# Patient Record
Sex: FEMALE | Race: BLACK OR AFRICAN AMERICAN | Employment: OTHER | URBAN - METROPOLITAN AREA
[De-identification: names, ages, dates, MRNs, and addresses within clinical notes are randomized per-mention and may not be internally consistent; named-entity substitution may affect disease eponyms.]

---

## 2019-09-17 ENCOUNTER — OFFICE VISIT (OUTPATIENT)
Dept: CARDIOLOGY CLINIC | Age: 84
End: 2019-09-17
Payer: MEDICARE

## 2019-09-17 VITALS
DIASTOLIC BLOOD PRESSURE: 80 MMHG | BODY MASS INDEX: 20.66 KG/M2 | SYSTOLIC BLOOD PRESSURE: 138 MMHG | HEART RATE: 72 BPM | HEIGHT: 65 IN | WEIGHT: 124 LBS

## 2019-09-17 DIAGNOSIS — I25.119 CORONARY ARTERY DISEASE INVOLVING NATIVE CORONARY ARTERY OF NATIVE HEART WITH ANGINA PECTORIS (HCC): ICD-10-CM

## 2019-09-17 DIAGNOSIS — R07.9 CHEST PAIN, UNSPECIFIED TYPE: ICD-10-CM

## 2019-09-17 DIAGNOSIS — I48.91 ATRIAL FIBRILLATION, UNSPECIFIED TYPE (HCC): Primary | ICD-10-CM

## 2019-09-17 DIAGNOSIS — E78.5 HYPERLIPIDEMIA, UNSPECIFIED HYPERLIPIDEMIA TYPE: ICD-10-CM

## 2019-09-17 DIAGNOSIS — I10 ESSENTIAL HYPERTENSION: ICD-10-CM

## 2019-09-17 PROBLEM — I25.10 CAD (CORONARY ARTERY DISEASE): Status: ACTIVE | Noted: 2019-09-17

## 2019-09-17 PROCEDURE — G8420 CALC BMI NORM PARAMETERS: HCPCS | Performed by: INTERNAL MEDICINE

## 2019-09-17 PROCEDURE — G8400 PT W/DXA NO RESULTS DOC: HCPCS | Performed by: INTERNAL MEDICINE

## 2019-09-17 PROCEDURE — G8599 NO ASA/ANTIPLAT THER USE RNG: HCPCS | Performed by: INTERNAL MEDICINE

## 2019-09-17 PROCEDURE — G8427 DOCREV CUR MEDS BY ELIG CLIN: HCPCS | Performed by: INTERNAL MEDICINE

## 2019-09-17 PROCEDURE — 1036F TOBACCO NON-USER: CPT | Performed by: INTERNAL MEDICINE

## 2019-09-17 PROCEDURE — 1090F PRES/ABSN URINE INCON ASSESS: CPT | Performed by: INTERNAL MEDICINE

## 2019-09-17 PROCEDURE — 1123F ACP DISCUSS/DSCN MKR DOCD: CPT | Performed by: INTERNAL MEDICINE

## 2019-09-17 PROCEDURE — 4040F PNEUMOC VAC/ADMIN/RCVD: CPT | Performed by: INTERNAL MEDICINE

## 2019-09-17 PROCEDURE — 93000 ELECTROCARDIOGRAM COMPLETE: CPT | Performed by: INTERNAL MEDICINE

## 2019-09-17 PROCEDURE — 99204 OFFICE O/P NEW MOD 45 MIN: CPT | Performed by: INTERNAL MEDICINE

## 2019-09-17 RX ORDER — LEVOTHYROXINE SODIUM 0.07 MG/1
75 TABLET ORAL DAILY
COMMUNITY
End: 2020-06-22 | Stop reason: SDUPTHER

## 2019-09-17 RX ORDER — PRIMIDONE 50 MG/1
50 TABLET ORAL NIGHTLY
COMMUNITY
End: 2020-04-14 | Stop reason: SDUPTHER

## 2019-09-17 RX ORDER — ROSUVASTATIN CALCIUM 10 MG/1
10 TABLET, COATED ORAL DAILY
COMMUNITY
End: 2019-12-04 | Stop reason: SDUPTHER

## 2019-09-17 RX ORDER — RANOLAZINE 500 MG/1
500 TABLET, EXTENDED RELEASE ORAL 2 TIMES DAILY
Qty: 60 TABLET | Refills: 3 | Status: SHIPPED | OUTPATIENT
Start: 2019-09-17 | End: 2019-09-18

## 2019-09-17 RX ORDER — ISOSORBIDE MONONITRATE 120 MG/1
180 TABLET, EXTENDED RELEASE ORAL DAILY
COMMUNITY
End: 2019-12-04 | Stop reason: SDUPTHER

## 2019-09-17 RX ORDER — LISINOPRIL 40 MG/1
40 TABLET ORAL DAILY
COMMUNITY
End: 2020-05-28 | Stop reason: SDUPTHER

## 2019-09-17 RX ORDER — IBUPROFEN 800 MG/1
800 TABLET ORAL PRN
Status: ON HOLD | COMMUNITY
End: 2019-12-24

## 2019-09-17 ASSESSMENT — ENCOUNTER SYMPTOMS
EYE PAIN: 0
APNEA: 0
DIARRHEA: 0
VOMITING: 0
COLOR CHANGE: 0
WHEEZING: 0
TROUBLE SWALLOWING: 1
NAUSEA: 0
CONSTIPATION: 1
ABDOMINAL DISTENTION: 0
CHEST TIGHTNESS: 0
BLOOD IN STOOL: 0
ABDOMINAL PAIN: 0
BACK PAIN: 0
STRIDOR: 0
PHOTOPHOBIA: 0

## 2019-09-18 ENCOUNTER — TELEPHONE (OUTPATIENT)
Dept: CARDIOLOGY CLINIC | Age: 84
End: 2019-09-18

## 2019-09-18 NOTE — TELEPHONE ENCOUNTER
Deepthi betancourt from [employer] regarding an interaction between ranolazine and primidone.  Please reach Harmony Gaxiola on [phone]

## 2019-11-05 ENCOUNTER — OFFICE VISIT (OUTPATIENT)
Dept: PRIMARY CARE CLINIC | Age: 84
End: 2019-11-05
Payer: MEDICARE

## 2019-11-05 ENCOUNTER — TELEPHONE (OUTPATIENT)
Dept: PRIMARY CARE CLINIC | Age: 84
End: 2019-11-05

## 2019-11-05 VITALS
RESPIRATION RATE: 16 BRPM | SYSTOLIC BLOOD PRESSURE: 130 MMHG | DIASTOLIC BLOOD PRESSURE: 60 MMHG | HEART RATE: 68 BPM | OXYGEN SATURATION: 98 % | HEIGHT: 65 IN | TEMPERATURE: 98.1 F | BODY MASS INDEX: 21.06 KG/M2 | WEIGHT: 126.4 LBS

## 2019-11-05 DIAGNOSIS — Z23 NEED FOR SHINGLES VACCINE: ICD-10-CM

## 2019-11-05 DIAGNOSIS — M54.2 NECK PAIN: ICD-10-CM

## 2019-11-05 DIAGNOSIS — Z12.31 ENCOUNTER FOR SCREENING MAMMOGRAM FOR BREAST CANCER: ICD-10-CM

## 2019-11-05 DIAGNOSIS — I10 ESSENTIAL HYPERTENSION: Primary | ICD-10-CM

## 2019-11-05 DIAGNOSIS — E78.2 MIXED HYPERLIPIDEMIA: ICD-10-CM

## 2019-11-05 DIAGNOSIS — F32.9 REACTIVE DEPRESSION: ICD-10-CM

## 2019-11-05 DIAGNOSIS — E89.0 POSTSURGICAL HYPOTHYROIDISM: ICD-10-CM

## 2019-11-05 PROCEDURE — 1036F TOBACCO NON-USER: CPT | Performed by: INTERNAL MEDICINE

## 2019-11-05 PROCEDURE — G8420 CALC BMI NORM PARAMETERS: HCPCS | Performed by: INTERNAL MEDICINE

## 2019-11-05 PROCEDURE — 1123F ACP DISCUSS/DSCN MKR DOCD: CPT | Performed by: INTERNAL MEDICINE

## 2019-11-05 PROCEDURE — 1090F PRES/ABSN URINE INCON ASSESS: CPT | Performed by: INTERNAL MEDICINE

## 2019-11-05 PROCEDURE — G8599 NO ASA/ANTIPLAT THER USE RNG: HCPCS | Performed by: INTERNAL MEDICINE

## 2019-11-05 PROCEDURE — 4040F PNEUMOC VAC/ADMIN/RCVD: CPT | Performed by: INTERNAL MEDICINE

## 2019-11-05 PROCEDURE — G8427 DOCREV CUR MEDS BY ELIG CLIN: HCPCS | Performed by: INTERNAL MEDICINE

## 2019-11-05 PROCEDURE — G8400 PT W/DXA NO RESULTS DOC: HCPCS | Performed by: INTERNAL MEDICINE

## 2019-11-05 PROCEDURE — 99203 OFFICE O/P NEW LOW 30 MIN: CPT | Performed by: INTERNAL MEDICINE

## 2019-11-05 PROCEDURE — G8484 FLU IMMUNIZE NO ADMIN: HCPCS | Performed by: INTERNAL MEDICINE

## 2019-11-05 RX ORDER — BUPROPION HYDROCHLORIDE 150 MG/1
150 TABLET ORAL DAILY
COMMUNITY
Start: 2019-08-20 | End: 2020-02-11 | Stop reason: DRUGHIGH

## 2019-11-05 RX ORDER — LIDOCAINE 50 MG/G
1 PATCH TOPICAL DAILY
Qty: 30 PATCH | Refills: 0 | Status: SHIPPED | OUTPATIENT
Start: 2019-11-05 | End: 2020-03-30 | Stop reason: CLARIF

## 2019-11-05 ASSESSMENT — PATIENT HEALTH QUESTIONNAIRE - PHQ9
SUM OF ALL RESPONSES TO PHQ QUESTIONS 1-9: 0
2. FEELING DOWN, DEPRESSED OR HOPELESS: 0
SUM OF ALL RESPONSES TO PHQ QUESTIONS 1-9: 0
SUM OF ALL RESPONSES TO PHQ9 QUESTIONS 1 & 2: 0
1. LITTLE INTEREST OR PLEASURE IN DOING THINGS: 0

## 2019-11-14 PROBLEM — E89.0 POSTSURGICAL HYPOTHYROIDISM: Status: ACTIVE | Noted: 2019-11-14

## 2019-11-14 PROBLEM — F32.9 REACTIVE DEPRESSION: Status: ACTIVE | Noted: 2019-11-14

## 2019-11-14 PROBLEM — M54.2 NECK PAIN: Status: ACTIVE | Noted: 2019-11-14

## 2019-11-14 ASSESSMENT — ENCOUNTER SYMPTOMS
CHEST TIGHTNESS: 0
NAUSEA: 0
CONSTIPATION: 0
COUGH: 0
SHORTNESS OF BREATH: 0
VOMITING: 0
ABDOMINAL PAIN: 0
DIARRHEA: 0
BLOOD IN STOOL: 0
WHEEZING: 0
SINUS PRESSURE: 0
SORE THROAT: 0
RHINORRHEA: 0

## 2019-11-25 ENCOUNTER — HOSPITAL ENCOUNTER (OUTPATIENT)
Dept: WOMENS IMAGING | Age: 84
Discharge: HOME | End: 2019-11-25
Payer: MEDICARE

## 2019-11-25 DIAGNOSIS — Z12.31 ENCOUNTER FOR SCREENING MAMMOGRAM FOR BREAST CANCER: ICD-10-CM

## 2019-11-25 PROCEDURE — 77063 BREAST TOMOSYNTHESIS BI: CPT

## 2019-12-02 ENCOUNTER — TELEPHONE (OUTPATIENT)
Dept: PRIMARY CARE CLINIC | Age: 84
End: 2019-12-02

## 2019-12-04 ENCOUNTER — OFFICE VISIT (OUTPATIENT)
Dept: PRIMARY CARE CLINIC | Age: 84
End: 2019-12-04
Payer: MEDICARE

## 2019-12-04 VITALS
HEART RATE: 59 BPM | RESPIRATION RATE: 12 BRPM | DIASTOLIC BLOOD PRESSURE: 73 MMHG | BODY MASS INDEX: 20.85 KG/M2 | SYSTOLIC BLOOD PRESSURE: 170 MMHG | WEIGHT: 123.4 LBS | OXYGEN SATURATION: 97 %

## 2019-12-04 DIAGNOSIS — I10 ESSENTIAL HYPERTENSION: ICD-10-CM

## 2019-12-04 DIAGNOSIS — E78.2 MIXED HYPERLIPIDEMIA: Chronic | ICD-10-CM

## 2019-12-04 DIAGNOSIS — E89.0 POSTSURGICAL HYPOTHYROIDISM: ICD-10-CM

## 2019-12-04 PROCEDURE — 1090F PRES/ABSN URINE INCON ASSESS: CPT | Performed by: INTERNAL MEDICINE

## 2019-12-04 PROCEDURE — 1123F ACP DISCUSS/DSCN MKR DOCD: CPT | Performed by: INTERNAL MEDICINE

## 2019-12-04 PROCEDURE — 1036F TOBACCO NON-USER: CPT | Performed by: INTERNAL MEDICINE

## 2019-12-04 PROCEDURE — G8420 CALC BMI NORM PARAMETERS: HCPCS | Performed by: INTERNAL MEDICINE

## 2019-12-04 PROCEDURE — 99214 OFFICE O/P EST MOD 30 MIN: CPT | Performed by: INTERNAL MEDICINE

## 2019-12-04 PROCEDURE — G8427 DOCREV CUR MEDS BY ELIG CLIN: HCPCS | Performed by: INTERNAL MEDICINE

## 2019-12-04 PROCEDURE — 4040F PNEUMOC VAC/ADMIN/RCVD: CPT | Performed by: INTERNAL MEDICINE

## 2019-12-04 PROCEDURE — G8484 FLU IMMUNIZE NO ADMIN: HCPCS | Performed by: INTERNAL MEDICINE

## 2019-12-04 PROCEDURE — G8400 PT W/DXA NO RESULTS DOC: HCPCS | Performed by: INTERNAL MEDICINE

## 2019-12-04 PROCEDURE — G8599 NO ASA/ANTIPLAT THER USE RNG: HCPCS | Performed by: INTERNAL MEDICINE

## 2019-12-04 RX ORDER — ROSUVASTATIN CALCIUM 10 MG/1
10 TABLET, COATED ORAL DAILY
Qty: 90 TABLET | Refills: 3 | Status: ON HOLD | OUTPATIENT
Start: 2019-12-04 | End: 2020-04-01 | Stop reason: HOSPADM

## 2019-12-04 RX ORDER — HYDROCHLOROTHIAZIDE 25 MG/1
25 TABLET ORAL EVERY MORNING
Qty: 30 TABLET | Refills: 5 | Status: SHIPPED | OUTPATIENT
Start: 2019-12-04 | End: 2019-12-05 | Stop reason: SDUPTHER

## 2019-12-04 RX ORDER — ISOSORBIDE MONONITRATE 120 MG/1
180 TABLET, EXTENDED RELEASE ORAL DAILY
Qty: 90 TABLET | Refills: 3 | Status: SHIPPED | OUTPATIENT
Start: 2019-12-04 | End: 2020-09-02

## 2019-12-05 DIAGNOSIS — I10 ESSENTIAL HYPERTENSION: Primary | ICD-10-CM

## 2019-12-05 RX ORDER — HYDROCHLOROTHIAZIDE 25 MG/1
25 TABLET ORAL EVERY MORNING
Qty: 90 TABLET | Refills: 1 | Status: SHIPPED | OUTPATIENT
Start: 2019-12-05 | End: 2020-03-10 | Stop reason: DRUGHIGH

## 2019-12-09 ENCOUNTER — OFFICE VISIT (OUTPATIENT)
Dept: SURGERY | Age: 84
End: 2019-12-09
Payer: MEDICARE

## 2019-12-09 ENCOUNTER — PREP FOR PROCEDURE (OUTPATIENT)
Dept: SURGERY | Age: 84
End: 2019-12-09

## 2019-12-09 VITALS
BODY MASS INDEX: 21.17 KG/M2 | WEIGHT: 124 LBS | SYSTOLIC BLOOD PRESSURE: 130 MMHG | DIASTOLIC BLOOD PRESSURE: 78 MMHG | HEIGHT: 64 IN

## 2019-12-09 DIAGNOSIS — M79.89 SOFT TISSUE MASS: Primary | ICD-10-CM

## 2019-12-09 PROCEDURE — G8420 CALC BMI NORM PARAMETERS: HCPCS | Performed by: SURGERY

## 2019-12-09 PROCEDURE — 1090F PRES/ABSN URINE INCON ASSESS: CPT | Performed by: SURGERY

## 2019-12-09 PROCEDURE — 1036F TOBACCO NON-USER: CPT | Performed by: SURGERY

## 2019-12-09 PROCEDURE — G8599 NO ASA/ANTIPLAT THER USE RNG: HCPCS | Performed by: SURGERY

## 2019-12-09 PROCEDURE — G8484 FLU IMMUNIZE NO ADMIN: HCPCS | Performed by: SURGERY

## 2019-12-09 PROCEDURE — G8400 PT W/DXA NO RESULTS DOC: HCPCS | Performed by: SURGERY

## 2019-12-09 PROCEDURE — 99203 OFFICE O/P NEW LOW 30 MIN: CPT | Performed by: SURGERY

## 2019-12-09 PROCEDURE — 1123F ACP DISCUSS/DSCN MKR DOCD: CPT | Performed by: SURGERY

## 2019-12-09 PROCEDURE — G8427 DOCREV CUR MEDS BY ELIG CLIN: HCPCS | Performed by: SURGERY

## 2019-12-09 PROCEDURE — 4040F PNEUMOC VAC/ADMIN/RCVD: CPT | Performed by: SURGERY

## 2019-12-09 RX ORDER — SODIUM CHLORIDE 0.9 % (FLUSH) 0.9 %
10 SYRINGE (ML) INJECTION EVERY 12 HOURS SCHEDULED
Status: CANCELLED | OUTPATIENT
Start: 2019-12-09

## 2019-12-09 RX ORDER — SODIUM CHLORIDE 0.9 % (FLUSH) 0.9 %
10 SYRINGE (ML) INJECTION PRN
Status: CANCELLED | OUTPATIENT
Start: 2019-12-09

## 2019-12-09 ASSESSMENT — ENCOUNTER SYMPTOMS
VOICE CHANGE: 1
ABDOMINAL PAIN: 1
EYE DISCHARGE: 1
TROUBLE SWALLOWING: 1
APNEA: 0
CHEST TIGHTNESS: 0
RHINORRHEA: 1
ABDOMINAL DISTENTION: 1
EYE ITCHING: 1
COLOR CHANGE: 0
BACK PAIN: 1
NAUSEA: 1

## 2019-12-24 ENCOUNTER — ANESTHESIA EVENT (OUTPATIENT)
Dept: OPERATING ROOM | Age: 84
End: 2019-12-24
Payer: MEDICARE

## 2019-12-24 ENCOUNTER — HOSPITAL ENCOUNTER (OUTPATIENT)
Age: 84
Setting detail: OUTPATIENT SURGERY
Discharge: HOME | End: 2019-12-24
Attending: SURGERY | Admitting: SURGERY
Payer: MEDICARE

## 2019-12-24 ENCOUNTER — ANESTHESIA (OUTPATIENT)
Dept: OPERATING ROOM | Age: 84
End: 2019-12-24
Payer: MEDICARE

## 2019-12-24 VITALS
HEART RATE: 66 BPM | BODY MASS INDEX: 20.98 KG/M2 | DIASTOLIC BLOOD PRESSURE: 70 MMHG | SYSTOLIC BLOOD PRESSURE: 138 MMHG | TEMPERATURE: 97.3 F | HEIGHT: 64 IN | OXYGEN SATURATION: 99 % | WEIGHT: 122.9 LBS | RESPIRATION RATE: 17 BRPM

## 2019-12-24 VITALS — SYSTOLIC BLOOD PRESSURE: 117 MMHG | DIASTOLIC BLOOD PRESSURE: 59 MMHG | OXYGEN SATURATION: 99 % | TEMPERATURE: 97.2 F

## 2019-12-24 DIAGNOSIS — L98.9 SKIN LESION: Primary | ICD-10-CM

## 2019-12-24 LAB
ANION GAP SERPL CALCULATED.3IONS-SCNC: 9 MMOL/L (ref 3–16)
BUN BLDV-MCNC: 19 MG/DL (ref 7–20)
CALCIUM SERPL-MCNC: 9.4 MG/DL (ref 8.3–10.6)
CHLORIDE BLD-SCNC: 104 MMOL/L (ref 99–110)
CO2: 28 MMOL/L (ref 21–32)
CREAT SERPL-MCNC: 0.9 MG/DL (ref 0.6–1.2)
GFR AFRICAN AMERICAN: >60
GFR NON-AFRICAN AMERICAN: 60
GLUCOSE BLD-MCNC: 86 MG/DL (ref 70–99)
POTASSIUM SERPL-SCNC: 3.7 MMOL/L (ref 3.5–5.1)
SODIUM BLD-SCNC: 141 MMOL/L (ref 136–145)

## 2019-12-24 PROCEDURE — 12032 INTMD RPR S/A/T/EXT 2.6-7.5: CPT | Performed by: SURGERY

## 2019-12-24 PROCEDURE — 3700000001 HC ADD 15 MINUTES (ANESTHESIA): Performed by: SURGERY

## 2019-12-24 PROCEDURE — 3600000012 HC SURGERY LEVEL 2 ADDTL 15MIN: Performed by: SURGERY

## 2019-12-24 PROCEDURE — 11402 EXC TR-EXT B9+MARG 1.1-2 CM: CPT | Performed by: SURGERY

## 2019-12-24 PROCEDURE — 2580000003 HC RX 258: Performed by: NURSE ANESTHETIST, CERTIFIED REGISTERED

## 2019-12-24 PROCEDURE — 21930 EXC BACK LES SC < 3 CM: CPT | Performed by: SURGERY

## 2019-12-24 PROCEDURE — 7100000011 HC PHASE II RECOVERY - ADDTL 15 MIN: Performed by: SURGERY

## 2019-12-24 PROCEDURE — 2709999900 HC NON-CHARGEABLE SUPPLY: Performed by: SURGERY

## 2019-12-24 PROCEDURE — 36415 COLL VENOUS BLD VENIPUNCTURE: CPT

## 2019-12-24 PROCEDURE — 2500000003 HC RX 250 WO HCPCS: Performed by: NURSE ANESTHETIST, CERTIFIED REGISTERED

## 2019-12-24 PROCEDURE — 7100000001 HC PACU RECOVERY - ADDTL 15 MIN: Performed by: SURGERY

## 2019-12-24 PROCEDURE — 7100000010 HC PHASE II RECOVERY - FIRST 15 MIN: Performed by: SURGERY

## 2019-12-24 PROCEDURE — 7100000000 HC PACU RECOVERY - FIRST 15 MIN: Performed by: SURGERY

## 2019-12-24 PROCEDURE — 6360000002 HC RX W HCPCS: Performed by: NURSE ANESTHETIST, CERTIFIED REGISTERED

## 2019-12-24 PROCEDURE — 88304 TISSUE EXAM BY PATHOLOGIST: CPT

## 2019-12-24 PROCEDURE — 80048 BASIC METABOLIC PNL TOTAL CA: CPT

## 2019-12-24 PROCEDURE — 6360000002 HC RX W HCPCS: Performed by: SURGERY

## 2019-12-24 PROCEDURE — 3700000000 HC ANESTHESIA ATTENDED CARE: Performed by: SURGERY

## 2019-12-24 PROCEDURE — 3600000002 HC SURGERY LEVEL 2 BASE: Performed by: SURGERY

## 2019-12-24 PROCEDURE — 2580000003 HC RX 258: Performed by: SURGERY

## 2019-12-24 RX ORDER — OXYCODONE HYDROCHLORIDE 5 MG/1
5 TABLET ORAL EVERY 4 HOURS PRN
Qty: 10 TABLET | Refills: 0 | Status: SHIPPED | OUTPATIENT
Start: 2019-12-24 | End: 2019-12-31

## 2019-12-24 RX ORDER — MAGNESIUM HYDROXIDE 1200 MG/15ML
LIQUID ORAL CONTINUOUS PRN
Status: COMPLETED | OUTPATIENT
Start: 2019-12-24 | End: 2019-12-24

## 2019-12-24 RX ORDER — GLYCOPYRROLATE 1 MG/5 ML
SYRINGE (ML) INTRAVENOUS PRN
Status: DISCONTINUED | OUTPATIENT
Start: 2019-12-24 | End: 2019-12-24 | Stop reason: SDUPTHER

## 2019-12-24 RX ORDER — DEXAMETHASONE SODIUM PHOSPHATE 4 MG/ML
INJECTION, SOLUTION INTRA-ARTICULAR; INTRALESIONAL; INTRAMUSCULAR; INTRAVENOUS; SOFT TISSUE PRN
Status: DISCONTINUED | OUTPATIENT
Start: 2019-12-24 | End: 2019-12-24 | Stop reason: SDUPTHER

## 2019-12-24 RX ORDER — BUPIVACAINE HYDROCHLORIDE AND EPINEPHRINE 5; 5 MG/ML; UG/ML
INJECTION, SOLUTION PERINEURAL
Status: COMPLETED | OUTPATIENT
Start: 2019-12-24 | End: 2019-12-24

## 2019-12-24 RX ORDER — SODIUM CHLORIDE, SODIUM LACTATE, POTASSIUM CHLORIDE, CALCIUM CHLORIDE 600; 310; 30; 20 MG/100ML; MG/100ML; MG/100ML; MG/100ML
INJECTION, SOLUTION INTRAVENOUS CONTINUOUS PRN
Status: DISCONTINUED | OUTPATIENT
Start: 2019-12-24 | End: 2019-12-24 | Stop reason: SDUPTHER

## 2019-12-24 RX ORDER — PROPOFOL 10 MG/ML
INJECTION, EMULSION INTRAVENOUS CONTINUOUS PRN
Status: DISCONTINUED | OUTPATIENT
Start: 2019-12-24 | End: 2019-12-24 | Stop reason: SDUPTHER

## 2019-12-24 RX ORDER — LIDOCAINE HYDROCHLORIDE 20 MG/ML
INJECTION, SOLUTION INFILTRATION; PERINEURAL PRN
Status: DISCONTINUED | OUTPATIENT
Start: 2019-12-24 | End: 2019-12-24 | Stop reason: SDUPTHER

## 2019-12-24 RX ORDER — SODIUM CHLORIDE 0.9 % (FLUSH) 0.9 %
10 SYRINGE (ML) INJECTION EVERY 12 HOURS SCHEDULED
Status: DISCONTINUED | OUTPATIENT
Start: 2019-12-24 | End: 2019-12-24 | Stop reason: HOSPADM

## 2019-12-24 RX ORDER — PHENYLEPHRINE HCL IN 0.9% NACL 1 MG/10 ML
SYRINGE (ML) INTRAVENOUS PRN
Status: DISCONTINUED | OUTPATIENT
Start: 2019-12-24 | End: 2019-12-24 | Stop reason: SDUPTHER

## 2019-12-24 RX ORDER — SODIUM CHLORIDE 0.9 % (FLUSH) 0.9 %
10 SYRINGE (ML) INJECTION PRN
Status: DISCONTINUED | OUTPATIENT
Start: 2019-12-24 | End: 2019-12-24 | Stop reason: HOSPADM

## 2019-12-24 RX ORDER — PROPOFOL 10 MG/ML
INJECTION, EMULSION INTRAVENOUS PRN
Status: DISCONTINUED | OUTPATIENT
Start: 2019-12-24 | End: 2019-12-24 | Stop reason: SDUPTHER

## 2019-12-24 RX ORDER — CEFAZOLIN SODIUM 2 G/100ML
2 INJECTION, SOLUTION INTRAVENOUS
Status: COMPLETED | OUTPATIENT
Start: 2019-12-24 | End: 2019-12-24

## 2019-12-24 RX ORDER — ONDANSETRON 2 MG/ML
INJECTION INTRAMUSCULAR; INTRAVENOUS PRN
Status: DISCONTINUED | OUTPATIENT
Start: 2019-12-24 | End: 2019-12-24 | Stop reason: SDUPTHER

## 2019-12-24 RX ORDER — TIZANIDINE 2 MG/1
2 TABLET ORAL 3 TIMES DAILY
COMMUNITY
End: 2020-03-10

## 2019-12-24 RX ORDER — FENTANYL CITRATE 50 UG/ML
INJECTION, SOLUTION INTRAMUSCULAR; INTRAVENOUS PRN
Status: DISCONTINUED | OUTPATIENT
Start: 2019-12-24 | End: 2019-12-24 | Stop reason: SDUPTHER

## 2019-12-24 RX ADMIN — FENTANYL CITRATE 25 MCG: 50 INJECTION, SOLUTION INTRAMUSCULAR; INTRAVENOUS at 07:30

## 2019-12-24 RX ADMIN — Medication 50 MCG: at 07:40

## 2019-12-24 RX ADMIN — PROPOFOL 80 MCG/KG/MIN: 10 INJECTION, EMULSION INTRAVENOUS at 07:32

## 2019-12-24 RX ADMIN — FENTANYL CITRATE 25 MCG: 50 INJECTION, SOLUTION INTRAMUSCULAR; INTRAVENOUS at 07:35

## 2019-12-24 RX ADMIN — CEFAZOLIN SODIUM 2 G: 2 INJECTION, SOLUTION INTRAVENOUS at 07:23

## 2019-12-24 RX ADMIN — Medication 50 MCG: at 07:47

## 2019-12-24 RX ADMIN — PROPOFOL 20 MG: 10 INJECTION, EMULSION INTRAVENOUS at 07:40

## 2019-12-24 RX ADMIN — Medication 0.1 MG: at 07:37

## 2019-12-24 RX ADMIN — DEXAMETHASONE SODIUM PHOSPHATE 4 MG: 4 INJECTION, SOLUTION INTRAMUSCULAR; INTRAVENOUS at 07:36

## 2019-12-24 RX ADMIN — LIDOCAINE HYDROCHLORIDE 40 MG: 20 INJECTION, SOLUTION INFILTRATION; PERINEURAL at 07:32

## 2019-12-24 RX ADMIN — PROPOFOL 40 MG: 10 INJECTION, EMULSION INTRAVENOUS at 07:32

## 2019-12-24 RX ADMIN — ONDANSETRON 4 MG: 2 INJECTION INTRAMUSCULAR; INTRAVENOUS at 07:36

## 2019-12-24 RX ADMIN — SODIUM CHLORIDE, POTASSIUM CHLORIDE, SODIUM LACTATE AND CALCIUM CHLORIDE: 600; 310; 30; 20 INJECTION, SOLUTION INTRAVENOUS at 07:19

## 2019-12-24 RX ADMIN — PROPOFOL 20 MG: 10 INJECTION, EMULSION INTRAVENOUS at 07:42

## 2019-12-24 RX ADMIN — PROPOFOL 10 MG: 10 INJECTION, EMULSION INTRAVENOUS at 07:46

## 2019-12-24 ASSESSMENT — PULMONARY FUNCTION TESTS
PIF_VALUE: 0
PIF_VALUE: 1
PIF_VALUE: 0

## 2020-01-07 ENCOUNTER — OFFICE VISIT (OUTPATIENT)
Dept: SURGERY | Age: 85
End: 2020-01-07

## 2020-01-07 VITALS — DIASTOLIC BLOOD PRESSURE: 62 MMHG | SYSTOLIC BLOOD PRESSURE: 118 MMHG | BODY MASS INDEX: 20.94 KG/M2 | WEIGHT: 122 LBS

## 2020-01-07 PROCEDURE — 99024 POSTOP FOLLOW-UP VISIT: CPT | Performed by: SURGERY

## 2020-02-11 ENCOUNTER — OFFICE VISIT (OUTPATIENT)
Dept: PRIMARY CARE CLINIC | Age: 85
End: 2020-02-11
Payer: MEDICARE

## 2020-02-11 VITALS
WEIGHT: 124.6 LBS | HEART RATE: 57 BPM | SYSTOLIC BLOOD PRESSURE: 158 MMHG | HEIGHT: 64 IN | OXYGEN SATURATION: 98 % | DIASTOLIC BLOOD PRESSURE: 70 MMHG | BODY MASS INDEX: 21.27 KG/M2

## 2020-02-11 PROCEDURE — G8427 DOCREV CUR MEDS BY ELIG CLIN: HCPCS | Performed by: INTERNAL MEDICINE

## 2020-02-11 PROCEDURE — 1036F TOBACCO NON-USER: CPT | Performed by: INTERNAL MEDICINE

## 2020-02-11 PROCEDURE — G8484 FLU IMMUNIZE NO ADMIN: HCPCS | Performed by: INTERNAL MEDICINE

## 2020-02-11 PROCEDURE — 69210 REMOVE IMPACTED EAR WAX UNI: CPT | Performed by: INTERNAL MEDICINE

## 2020-02-11 PROCEDURE — G8420 CALC BMI NORM PARAMETERS: HCPCS | Performed by: INTERNAL MEDICINE

## 2020-02-11 PROCEDURE — 99214 OFFICE O/P EST MOD 30 MIN: CPT | Performed by: INTERNAL MEDICINE

## 2020-02-11 PROCEDURE — 1090F PRES/ABSN URINE INCON ASSESS: CPT | Performed by: INTERNAL MEDICINE

## 2020-02-11 PROCEDURE — 1123F ACP DISCUSS/DSCN MKR DOCD: CPT | Performed by: INTERNAL MEDICINE

## 2020-02-11 PROCEDURE — 4040F PNEUMOC VAC/ADMIN/RCVD: CPT | Performed by: INTERNAL MEDICINE

## 2020-02-11 PROCEDURE — G8400 PT W/DXA NO RESULTS DOC: HCPCS | Performed by: INTERNAL MEDICINE

## 2020-02-11 RX ORDER — AMLODIPINE BESYLATE 2.5 MG/1
2.5 TABLET ORAL DAILY
Qty: 30 TABLET | Refills: 1 | Status: SHIPPED | OUTPATIENT
Start: 2020-02-11 | End: 2020-02-11

## 2020-02-11 RX ORDER — BUPROPION HYDROCHLORIDE 300 MG/1
300 TABLET ORAL EVERY MORNING
Qty: 30 TABLET | Refills: 1 | Status: SHIPPED | OUTPATIENT
Start: 2020-02-11 | End: 2020-05-03 | Stop reason: SDUPTHER

## 2020-02-11 RX ORDER — AMLODIPINE BESYLATE 2.5 MG/1
2.5 TABLET ORAL DAILY
Qty: 90 TABLET | Refills: 0 | Status: SHIPPED | OUTPATIENT
Start: 2020-02-11 | End: 2020-05-28 | Stop reason: SDUPTHER

## 2020-02-11 ASSESSMENT — ENCOUNTER SYMPTOMS
CONSTIPATION: 0
RHINORRHEA: 0
COUGH: 0
ABDOMINAL PAIN: 0
SINUS PRESSURE: 0
NAUSEA: 1
CHEST TIGHTNESS: 0
SORE THROAT: 0
BLOOD IN STOOL: 0
VOMITING: 0
WHEEZING: 0
SHORTNESS OF BREATH: 0
DIARRHEA: 0

## 2020-02-11 NOTE — PATIENT INSTRUCTIONS
1. Essential hypertension  - blood pressure is elevated and has been high  -Continue same medications  -Start amLODIPine (NORVASC) 2.5 MG tablet; Take 1 tablet by mouth daily  Dispense: 30 tablet; Refill: 1  -Low sodium diet    2. Mixed hyperlipidemia  -Continue same medications  -Low fat, low cholesterol diet  -Patient to have fasting labs done previously ordered    3. Reactive depression  -Increase buPROPion (WELLBUTRIN XL) 300 MG extended release tablet; Take 1 tablet by mouth every morning  Dispense: 30 tablet; Refill: 1  - patient declines counseling    4. Postsurgical hypothyroidism  -stable  -Continue same medications    5.  Bilateral impacted cerumen  - 82896 - CT REMOVE IMPACTED EAR WAX  Ear wax removed by Medical Assistant by soaking with peroxide for 5 minutes and then wax flushed out with an ear wash spray bottle, TM's negative after ear wax removed

## 2020-02-11 NOTE — PROGRESS NOTES
Naveen Baugh   Date ofBirth:  1935    Date of Visit:  2020    Chief Complaint   Patient presents with    Hyperlipidemia    Hypertension    Depression    Hypothyroidism    Cerumen Impaction       HPI  Hypertension- Pt's BP has been high. Pt's BP has been 168/86 (20), 171/82 (20), 128/70 (2/10/20). Pt takes Lisinopril 40mg po q day and HCTZ 25mg po q day. Pt decreases salt. Pt is not exercising. Hyperlipidemia- Pt takes Rosuvastatin. Pt decreases fat and cholesterol. Depression- Pt takes Wellbutrin XL. Pt's depression has been ok. Wellbutrin XL helps a little. Pt thinks it needs to be increased. Pt denies crying spells. Pt states she feels depressed. Pt's   in 2019. Pt declines counseling. Hypothyroidism- Pt takes Levothyroxine 75mg po q day. Pt states she is fatigued but thinks it is due to the pain she has. Pt denies constipation and weight gain. Pt has cold intolerance. Pt needs ear wax cleaned out. Review of Systems   Constitutional: Negative for chills, fatigue and fever. HENT: Negative for congestion, postnasal drip, rhinorrhea, sinus pressure and sore throat. Eyes: Negative for visual disturbance. Respiratory: Negative for cough, chest tightness, shortness of breath and wheezing. Cardiovascular: Negative for chest pain, palpitations and leg swelling. Gastrointestinal: Positive for nausea (occasional). Negative for abdominal pain, blood in stool, constipation, diarrhea and vomiting. Genitourinary: Negative for dysuria, frequency and hematuria. Musculoskeletal: Positive for neck pain (Pt sees Pain Management). Negative for arthralgias and myalgias. Skin: Negative for rash. Neurological: Positive for headaches. Negative for dizziness, tremors, syncope, weakness, light-headedness and numbness. Psychiatric/Behavioral: Negative for dysphoric mood and sleep disturbance. The patient is not nervous/anxious.         No Known normal.      Extraocular Movements: Extraocular movements intact. Conjunctiva/sclera: Conjunctivae normal.      Pupils: Pupils are equal, round, and reactive to light. Neck:      Musculoskeletal: Neck supple. Thyroid: No thyromegaly. Vascular: No carotid bruit. Cardiovascular:      Rate and Rhythm: Normal rate and regular rhythm. Heart sounds: Normal heart sounds, S1 normal and S2 normal. No murmur. No friction rub. No gallop. Pulmonary:      Effort: Pulmonary effort is normal. No respiratory distress. Breath sounds: Normal breath sounds. No wheezing, rhonchi or rales. Abdominal:      General: Bowel sounds are normal. There is no distension. Palpations: Abdomen is soft. Tenderness: There is abdominal tenderness (mid abdomen, patient states she has had tenderness for a long time and it is not new). Musculoskeletal:      Right lower leg: No edema. Left lower leg: No edema. Lymphadenopathy:      Head:      Right side of head: No submandibular adenopathy. Left side of head: No submandibular adenopathy. Neurological:      Mental Status: She is alert and oriented to person, place, and time. Psychiatric:         Mood and Affect: Mood normal.           No results found for this visit on 02/11/20. Lab Review   Admission on 12/24/2019, Discharged on 12/24/2019   Component Date Value    Sodium 12/24/2019 141     Potassium 12/24/2019 3.7     Chloride 12/24/2019 104     CO2 12/24/2019 28     Anion Gap 12/24/2019 9     Glucose 12/24/2019 86     BUN 12/24/2019 19     CREATININE 12/24/2019 0.9     GFR Non- 12/24/2019 60*    GFR  12/24/2019 >60     Calcium 12/24/2019 9.4          Assessment/Plan     1. Essential hypertension  - blood pressure is elevated and has been high  -Continue same medications  -Start amLODIPine (NORVASC) 2.5 MG tablet; Take 1 tablet by mouth daily  Dispense: 30 tablet; Refill: 1  -Low sodium diet    2. Mixed hyperlipidemia  -Continue same medications  -Low fat, low cholesterol diet  -Patient to have fasting labs done previously ordered    3. Reactive depression  -Increase buPROPion (WELLBUTRIN XL) 300 MG extended release tablet; Take 1 tablet by mouth every morning  Dispense: 30 tablet; Refill: 1  - patient declines counseling    4. Postsurgical hypothyroidism  -stable  -Continue same medications    5. Bilateral impacted cerumen  - 63431 - DC REMOVE IMPACTED EAR WAX  Ear wax removed by Medical Assistant by soaking with peroxide for 5 minutes and then wax flushed out with an ear wash spray bottle, TM's negative after ear wax removed        Discussed medications with patient, who voiced understanding of their use and indications. All questions answered. Return in about 4 weeks (around 3/10/2020) for hypertension and depression.

## 2020-02-27 DIAGNOSIS — E78.2 MIXED HYPERLIPIDEMIA: ICD-10-CM

## 2020-02-27 DIAGNOSIS — E89.0 POSTSURGICAL HYPOTHYROIDISM: ICD-10-CM

## 2020-02-27 DIAGNOSIS — I10 ESSENTIAL HYPERTENSION: ICD-10-CM

## 2020-02-27 LAB
A/G RATIO: 2 (ref 1.1–2.2)
ALBUMIN SERPL-MCNC: 4.5 G/DL (ref 3.4–5)
ALP BLD-CCNC: 57 U/L (ref 40–129)
ALT SERPL-CCNC: 7 U/L (ref 10–40)
ANION GAP SERPL CALCULATED.3IONS-SCNC: 15 MMOL/L (ref 3–16)
AST SERPL-CCNC: 17 U/L (ref 15–37)
BILIRUB SERPL-MCNC: 0.4 MG/DL (ref 0–1)
BUN BLDV-MCNC: 15 MG/DL (ref 7–20)
CALCIUM SERPL-MCNC: 10 MG/DL (ref 8.3–10.6)
CHLORIDE BLD-SCNC: 103 MMOL/L (ref 99–110)
CHOLESTEROL, TOTAL: 180 MG/DL (ref 0–199)
CO2: 27 MMOL/L (ref 21–32)
CREAT SERPL-MCNC: 0.8 MG/DL (ref 0.6–1.2)
GFR AFRICAN AMERICAN: >60
GFR NON-AFRICAN AMERICAN: >60
GLOBULIN: 2.2 G/DL
GLUCOSE BLD-MCNC: 93 MG/DL (ref 70–99)
HDLC SERPL-MCNC: 98 MG/DL (ref 40–60)
LDL CHOLESTEROL CALCULATED: 72 MG/DL
POTASSIUM SERPL-SCNC: 3.7 MMOL/L (ref 3.5–5.1)
SODIUM BLD-SCNC: 145 MMOL/L (ref 136–145)
TOTAL PROTEIN: 6.7 G/DL (ref 6.4–8.2)
TRIGL SERPL-MCNC: 49 MG/DL (ref 0–150)
TSH SERPL DL<=0.05 MIU/L-ACNC: 0.41 UIU/ML (ref 0.27–4.2)
VLDLC SERPL CALC-MCNC: 10 MG/DL

## 2020-03-10 ENCOUNTER — OFFICE VISIT (OUTPATIENT)
Dept: PRIMARY CARE CLINIC | Age: 85
End: 2020-03-10
Payer: MEDICARE

## 2020-03-10 VITALS
RESPIRATION RATE: 15 BRPM | WEIGHT: 122 LBS | HEIGHT: 61 IN | OXYGEN SATURATION: 96 % | SYSTOLIC BLOOD PRESSURE: 98 MMHG | DIASTOLIC BLOOD PRESSURE: 60 MMHG | HEART RATE: 71 BPM | TEMPERATURE: 98 F | BODY MASS INDEX: 23.03 KG/M2

## 2020-03-10 PROCEDURE — 99213 OFFICE O/P EST LOW 20 MIN: CPT | Performed by: INTERNAL MEDICINE

## 2020-03-10 PROCEDURE — G0008 ADMIN INFLUENZA VIRUS VAC: HCPCS | Performed by: INTERNAL MEDICINE

## 2020-03-10 PROCEDURE — 1036F TOBACCO NON-USER: CPT | Performed by: INTERNAL MEDICINE

## 2020-03-10 PROCEDURE — G8420 CALC BMI NORM PARAMETERS: HCPCS | Performed by: INTERNAL MEDICINE

## 2020-03-10 PROCEDURE — 4040F PNEUMOC VAC/ADMIN/RCVD: CPT | Performed by: INTERNAL MEDICINE

## 2020-03-10 PROCEDURE — 90653 IIV ADJUVANT VACCINE IM: CPT | Performed by: INTERNAL MEDICINE

## 2020-03-10 PROCEDURE — 1123F ACP DISCUSS/DSCN MKR DOCD: CPT | Performed by: INTERNAL MEDICINE

## 2020-03-10 PROCEDURE — G8400 PT W/DXA NO RESULTS DOC: HCPCS | Performed by: INTERNAL MEDICINE

## 2020-03-10 PROCEDURE — G8427 DOCREV CUR MEDS BY ELIG CLIN: HCPCS | Performed by: INTERNAL MEDICINE

## 2020-03-10 PROCEDURE — 1090F PRES/ABSN URINE INCON ASSESS: CPT | Performed by: INTERNAL MEDICINE

## 2020-03-10 PROCEDURE — G8482 FLU IMMUNIZE ORDER/ADMIN: HCPCS | Performed by: INTERNAL MEDICINE

## 2020-03-10 RX ORDER — GABAPENTIN 100 MG/1
CAPSULE ORAL
Status: ON HOLD | COMMUNITY
Start: 2020-02-27 | End: 2020-04-01 | Stop reason: HOSPADM

## 2020-03-10 RX ORDER — HYDROCHLOROTHIAZIDE 12.5 MG/1
12.5 CAPSULE, GELATIN COATED ORAL EVERY MORNING
Qty: 90 CAPSULE | Refills: 0 | Status: SHIPPED | OUTPATIENT
Start: 2020-03-10 | End: 2020-05-28

## 2020-03-10 NOTE — PATIENT INSTRUCTIONS
1. Essential hypertension  - blood pressure is running a little low  -Continue same medications  -decrease hydrochlorothiazide (MICROZIDE) 12.5 MG capsule; Take 1 capsule by mouth every morning  Dispense: 90 capsule; Refill: 0  -Low sodium diet  -Regular aerobic exercise    2. Need for influenza vaccination  - INFLUENZA, HIGH DOSE, 65 YRS +, IM, PF, PREFILL SYR, 0.5ML (FLUZONE HD)          Patient Education        Influenza (Flu) Vaccine (Inactivated or Recombinant): What You Need to Know  Why get vaccinated? Influenza vaccine can prevent influenza (flu). Flu is a contagious disease that spreads around the [place] every year, usually between October and May. Anyone can get the flu, but it is more dangerous for some people. Infants and young children, people 72years of age and older, pregnant women, and people with certain health conditions or a weakened immune system are at greatest risk of flu complications. Pneumonia, bronchitis, sinus infections and ear infections are examples of flu-related complications. If you have a medical condition, such as heart disease, cancer or diabetes, flu can make it worse. Flu can cause fever and chills, sore throat, muscle aches, fatigue, cough, headache, and runny or stuffy nose. Some people may have vomiting and diarrhea, though this is more common in children than adults. Each year, thousands of people in the [facility] die from flu, and many more are hospitalized. Flu vaccine prevents millions of illnesses and flu-related visits to the doctor each year. Influenza vaccine  CDC recommends everyone 10months of age and older get vaccinated every flu season. Children 6 months through 6years of age may need 2 doses during a single flu season. Everyone else needs only 1 dose each flu season. It takes about 2 weeks for protection to develop after vaccination. There are many flu viruses, and they are always changing.  Each year a new flu vaccine is made to protect

## 2020-03-10 NOTE — PROGRESS NOTES
Abdon Hernandez   Date ofBirth:  1935    Date of Visit:  3/10/2020    Chief Complaint   Patient presents with    Hypertension      one month follw up       HPI  Hypertension- Pt takes Lisinopril 40mg po q day, HCTZ 25mg po q day, and Lisinopril 40mg po q day. Pt monitors her BP and it has been low. Pt brought a log. Pt's BP was 104/66 (2/21/20), 106/62 (2/27/20), and 98/60 (3/10/20). Pt states she was dizzy once the other day. Pt decreases salt. Pt is not exercising. Review of Systems   Eyes: Negative for visual disturbance. Respiratory: Negative for chest tightness and shortness of breath. Cardiovascular: Negative for chest pain, palpitations and leg swelling. Genitourinary: Negative for frequency and hematuria. Neurological: Positive for dizziness. Negative for syncope, light-headedness and headaches.        No Known Allergies  Outpatient Medications Marked as Taking for the 3/10/20 encounter (Office Visit) with Bianca Lloyd MD   Medication Sig Dispense Refill    buPROPion (WELLBUTRIN XL) 300 MG extended release tablet Take 1 tablet by mouth every morning 30 tablet 1    amLODIPine (NORVASC) 2.5 MG tablet TAKE 1 TABLET BY MOUTH DAILY 90 tablet 0    hydrochlorothiazide (HYDRODIURIL) 25 MG tablet Take 1 tablet by mouth every morning 90 tablet 1    rosuvastatin (CRESTOR) 10 MG tablet Take 1 tablet by mouth daily 90 tablet 3    isosorbide mononitrate (IMDUR) 120 MG extended release tablet Take 1.5 tablets by mouth daily 90 tablet 3    lisinopril (PRINIVIL;ZESTRIL) 40 MG tablet Take 40 mg by mouth daily      levothyroxine (SYNTHROID) 75 MCG tablet Take 75 mcg by mouth Daily      vitamin D (CHOLECALCIFEROL) 1000 UNIT TABS tablet Take 1,000 Units by mouth daily      primidone (MYSOLINE) 50 MG tablet Take 50 mg by mouth nightly           Vitals:    03/10/20 1555   BP: 98/60   Site: Right Upper Arm   Position: Sitting   Cuff Size: Medium Adult   Pulse: 71   Resp: 15   Temp: 98 °F (36.7 °C) TempSrc: Oral   SpO2: 96%   Weight: 122 lb (55.3 kg)   Height: 5' 0.5\" (1.537 m)     Body mass index is 23.43 kg/m². Physical Exam  Nursing note reviewed. Constitutional:       General: She is not in acute distress. Appearance: She is well-developed. HENT:      Mouth/Throat:      Pharynx: Oropharynx is clear. Eyes:      Extraocular Movements: Extraocular movements intact. Pupils: Pupils are equal, round, and reactive to light. Neck:      Musculoskeletal: Neck supple. Thyroid: No thyromegaly. Vascular: No carotid bruit. Cardiovascular:      Rate and Rhythm: Normal rate and regular rhythm. Heart sounds: Normal heart sounds, S1 normal and S2 normal. No murmur. Pulmonary:      Effort: Pulmonary effort is normal.      Breath sounds: Normal breath sounds. Musculoskeletal:      Right lower leg: No edema. Left lower leg: No edema. No results found for this visit on 03/10/20.   Lab Review   Orders Only on 02/27/2020   Component Date Value    TSH 02/27/2020 0.41     Cholesterol, Total 02/27/2020 180     Triglycerides 02/27/2020 49     HDL 02/27/2020 98*    LDL Calculated 02/27/2020 72     VLDL Cholesterol Calcula* 02/27/2020 10     Sodium 02/27/2020 145     Potassium 02/27/2020 3.7     Chloride 02/27/2020 103     CO2 02/27/2020 27     Anion Gap 02/27/2020 15     Glucose 02/27/2020 93     BUN 02/27/2020 15     CREATININE 02/27/2020 0.8     GFR Non- 02/27/2020 >60     GFR  02/27/2020 >60     Calcium 02/27/2020 10.0     Total Protein 02/27/2020 6.7     Alb 02/27/2020 4.5     Albumin/Globulin Ratio 02/27/2020 2.0     Total Bilirubin 02/27/2020 0.4     Alkaline Phosphatase 02/27/2020 57     ALT 02/27/2020 7*    AST 02/27/2020 17     Globulin 02/27/2020 2.2    Admission on 12/24/2019, Discharged on 12/24/2019   Component Date Value    Sodium 12/24/2019 141     Potassium 12/24/2019 3.7     Chloride 12/24/2019 104  CO2 12/24/2019 28     Anion Gap 12/24/2019 9     Glucose 12/24/2019 86     BUN 12/24/2019 19     CREATININE 12/24/2019 0.9     GFR Non- 12/24/2019 60*    GFR  12/24/2019 >60     Calcium 12/24/2019 9.4          Assessment/Plan     1. Essential hypertension  - blood pressure is running a little low  -Continue same medications  -decrease hydrochlorothiazide (MICROZIDE) 12.5 MG capsule; Take 1 capsule by mouth every morning  Dispense: 90 capsule; Refill: 0  -Low sodium diet  -Regular aerobic exercise    2. Need for influenza vaccination  - INFLUENZA, TRIV, INACTIVATED, SUBUNIT, ADJUVANTED, 65 YRS AND OLDER, IM, PREFILL SYR, 0.5ML (FLUAD TRIV) given      Discussed medications with patient, who voiced understanding of their use and indications. All questions answered. Return in about 3 weeks (around 3/31/2020) for hypertension.

## 2020-03-17 ASSESSMENT — ENCOUNTER SYMPTOMS
SHORTNESS OF BREATH: 0
CHEST TIGHTNESS: 0

## 2020-03-27 ENCOUNTER — TELEPHONE (OUTPATIENT)
Dept: PRIMARY CARE CLINIC | Age: 85
End: 2020-03-27

## 2020-03-30 ENCOUNTER — HOSPITAL ENCOUNTER (OUTPATIENT)
Age: 85
Setting detail: OBSERVATION
Discharge: HOME | End: 2020-04-01
Attending: EMERGENCY MEDICINE | Admitting: INTERNAL MEDICINE
Payer: MEDICARE

## 2020-03-30 ENCOUNTER — APPOINTMENT (OUTPATIENT)
Dept: CT IMAGING | Age: 85
End: 2020-03-30
Payer: MEDICARE

## 2020-03-30 ENCOUNTER — APPOINTMENT (OUTPATIENT)
Dept: GENERAL RADIOLOGY | Age: 85
End: 2020-03-30
Payer: MEDICARE

## 2020-03-30 PROBLEM — I63.9 ACUTE CEREBROVASCULAR ACCIDENT (CVA) (HCC): Status: ACTIVE | Noted: 2020-03-30

## 2020-03-30 LAB
A/G RATIO: 1.9 (ref 1.1–2.2)
ALBUMIN SERPL-MCNC: 4 G/DL (ref 3.4–5)
ALP BLD-CCNC: 57 U/L (ref 40–129)
ALT SERPL-CCNC: 7 U/L (ref 10–40)
ANION GAP SERPL CALCULATED.3IONS-SCNC: 11 MMOL/L (ref 3–16)
AST SERPL-CCNC: 17 U/L (ref 15–37)
BASOPHILS ABSOLUTE: 0 K/UL (ref 0–0.2)
BASOPHILS RELATIVE PERCENT: 0.6 %
BILIRUB SERPL-MCNC: 0.4 MG/DL (ref 0–1)
BILIRUBIN URINE: NEGATIVE
BLOOD, URINE: NEGATIVE
BUN BLDV-MCNC: 11 MG/DL (ref 7–20)
CALCIUM SERPL-MCNC: 9.4 MG/DL (ref 8.3–10.6)
CHLORIDE BLD-SCNC: 98 MMOL/L (ref 99–110)
CLARITY: CLEAR
CO2: 28 MMOL/L (ref 21–32)
COLOR: YELLOW
CREAT SERPL-MCNC: 0.8 MG/DL (ref 0.6–1.2)
EKG ATRIAL RATE: 56 BPM
EKG DIAGNOSIS: NORMAL
EKG P AXIS: 64 DEGREES
EKG P-R INTERVAL: 178 MS
EKG Q-T INTERVAL: 446 MS
EKG QRS DURATION: 100 MS
EKG QTC CALCULATION (BAZETT): 430 MS
EKG R AXIS: 27 DEGREES
EKG T AXIS: 63 DEGREES
EKG VENTRICULAR RATE: 56 BPM
EOSINOPHILS ABSOLUTE: 0.1 K/UL (ref 0–0.6)
EOSINOPHILS RELATIVE PERCENT: 2.5 %
GFR AFRICAN AMERICAN: >60
GFR NON-AFRICAN AMERICAN: >60
GLOBULIN: 2.1 G/DL
GLUCOSE BLD-MCNC: 94 MG/DL (ref 70–99)
GLUCOSE BLD-MCNC: 98 MG/DL (ref 70–99)
GLUCOSE URINE: NEGATIVE MG/DL
HCT VFR BLD CALC: 40.6 % (ref 36–48)
HEMOGLOBIN: 12.9 G/DL (ref 12–16)
INR BLD: 0.95 (ref 0.86–1.14)
KETONES, URINE: NEGATIVE MG/DL
LEUKOCYTE ESTERASE, URINE: ABNORMAL
LYMPHOCYTES ABSOLUTE: 1.1 K/UL (ref 1–5.1)
LYMPHOCYTES RELATIVE PERCENT: 21.1 %
MCH RBC QN AUTO: 28.5 PG (ref 26–34)
MCHC RBC AUTO-ENTMCNC: 31.8 G/DL (ref 31–36)
MCV RBC AUTO: 89.4 FL (ref 80–100)
MICROSCOPIC EXAMINATION: YES
MONOCYTES ABSOLUTE: 0.6 K/UL (ref 0–1.3)
MONOCYTES RELATIVE PERCENT: 11.2 %
NEUTROPHILS ABSOLUTE: 3.5 K/UL (ref 1.7–7.7)
NEUTROPHILS RELATIVE PERCENT: 64.6 %
NITRITE, URINE: NEGATIVE
PDW BLD-RTO: 14.3 % (ref 12.4–15.4)
PERFORMED ON: NORMAL
PH UA: 6 (ref 5–8)
PLATELET # BLD: 188 K/UL (ref 135–450)
PMV BLD AUTO: 8.3 FL (ref 5–10.5)
POTASSIUM REFLEX MAGNESIUM: 3.6 MMOL/L (ref 3.5–5.1)
PROTEIN UA: NEGATIVE MG/DL
PROTHROMBIN TIME: 11 SEC (ref 10–13.2)
RBC # BLD: 4.54 M/UL (ref 4–5.2)
RBC UA: NORMAL /HPF (ref 0–4)
SODIUM BLD-SCNC: 137 MMOL/L (ref 136–145)
SPECIFIC GRAVITY UA: 1.01 (ref 1–1.03)
TOTAL PROTEIN: 6.1 G/DL (ref 6.4–8.2)
TROPONIN: <0.01 NG/ML
TROPONIN: <0.01 NG/ML
URINE TYPE: ABNORMAL
UROBILINOGEN, URINE: 0.2 E.U./DL
WBC # BLD: 5.4 K/UL (ref 4–11)
WBC UA: NORMAL /HPF (ref 0–5)

## 2020-03-30 PROCEDURE — G0378 HOSPITAL OBSERVATION PER HR: HCPCS

## 2020-03-30 PROCEDURE — 93005 ELECTROCARDIOGRAM TRACING: CPT | Performed by: EMERGENCY MEDICINE

## 2020-03-30 PROCEDURE — 36415 COLL VENOUS BLD VENIPUNCTURE: CPT

## 2020-03-30 PROCEDURE — 99285 EMERGENCY DEPT VISIT HI MDM: CPT

## 2020-03-30 PROCEDURE — 70450 CT HEAD/BRAIN W/O DYE: CPT

## 2020-03-30 PROCEDURE — 85025 COMPLETE CBC W/AUTO DIFF WBC: CPT

## 2020-03-30 PROCEDURE — 70498 CT ANGIOGRAPHY NECK: CPT

## 2020-03-30 PROCEDURE — 6360000004 HC RX CONTRAST MEDICATION: Performed by: EMERGENCY MEDICINE

## 2020-03-30 PROCEDURE — 2580000003 HC RX 258: Performed by: STUDENT IN AN ORGANIZED HEALTH CARE EDUCATION/TRAINING PROGRAM

## 2020-03-30 PROCEDURE — 84484 ASSAY OF TROPONIN QUANT: CPT

## 2020-03-30 PROCEDURE — 85610 PROTHROMBIN TIME: CPT

## 2020-03-30 PROCEDURE — 6370000000 HC RX 637 (ALT 250 FOR IP): Performed by: STUDENT IN AN ORGANIZED HEALTH CARE EDUCATION/TRAINING PROGRAM

## 2020-03-30 PROCEDURE — 71045 X-RAY EXAM CHEST 1 VIEW: CPT

## 2020-03-30 PROCEDURE — 81001 URINALYSIS AUTO W/SCOPE: CPT

## 2020-03-30 PROCEDURE — 80053 COMPREHEN METABOLIC PANEL: CPT

## 2020-03-30 RX ORDER — ASPIRIN 81 MG/1
81 TABLET, CHEWABLE ORAL DAILY
Status: DISCONTINUED | OUTPATIENT
Start: 2020-03-30 | End: 2020-04-01 | Stop reason: HOSPADM

## 2020-03-30 RX ORDER — PRIMIDONE 50 MG/1
50 TABLET ORAL NIGHTLY
Status: CANCELLED | OUTPATIENT
Start: 2020-03-30

## 2020-03-30 RX ORDER — BUPROPION HYDROCHLORIDE 150 MG/1
300 TABLET ORAL EVERY MORNING
Status: CANCELLED | OUTPATIENT
Start: 2020-03-31

## 2020-03-30 RX ORDER — NITROGLYCERIN 0.4 MG/1
0.4 TABLET SUBLINGUAL EVERY 5 MIN PRN
COMMUNITY
End: 2021-05-06 | Stop reason: ALTCHOICE

## 2020-03-30 RX ORDER — SODIUM CHLORIDE 9 MG/ML
INJECTION, SOLUTION INTRAVENOUS CONTINUOUS
Status: CANCELLED | OUTPATIENT
Start: 2020-03-30

## 2020-03-30 RX ORDER — HYDROCHLOROTHIAZIDE 12.5 MG/1
12.5 CAPSULE, GELATIN COATED ORAL EVERY MORNING
Status: DISCONTINUED | OUTPATIENT
Start: 2020-03-31 | End: 2020-04-01 | Stop reason: HOSPADM

## 2020-03-30 RX ORDER — ISOSORBIDE MONONITRATE 60 MG/1
180 TABLET, EXTENDED RELEASE ORAL DAILY
Status: DISCONTINUED | OUTPATIENT
Start: 2020-03-30 | End: 2020-04-01 | Stop reason: HOSPADM

## 2020-03-30 RX ORDER — LEVOTHYROXINE SODIUM 0.07 MG/1
75 TABLET ORAL DAILY
Status: DISCONTINUED | OUTPATIENT
Start: 2020-03-30 | End: 2020-04-01 | Stop reason: HOSPADM

## 2020-03-30 RX ORDER — PRIMIDONE 50 MG/1
50 TABLET ORAL NIGHTLY
Status: DISCONTINUED | OUTPATIENT
Start: 2020-03-30 | End: 2020-04-01 | Stop reason: HOSPADM

## 2020-03-30 RX ORDER — SODIUM CHLORIDE 9 MG/ML
INJECTION, SOLUTION INTRAVENOUS CONTINUOUS
Status: DISCONTINUED | OUTPATIENT
Start: 2020-03-30 | End: 2020-04-01 | Stop reason: HOSPADM

## 2020-03-30 RX ORDER — ROSUVASTATIN CALCIUM 20 MG/1
20 TABLET, COATED ORAL DAILY
Status: DISCONTINUED | OUTPATIENT
Start: 2020-03-30 | End: 2020-04-01 | Stop reason: HOSPADM

## 2020-03-30 RX ORDER — GABAPENTIN 100 MG/1
100 CAPSULE ORAL 2 TIMES DAILY
Status: CANCELLED | OUTPATIENT
Start: 2020-03-30

## 2020-03-30 RX ORDER — AMLODIPINE BESYLATE 2.5 MG/1
2.5 TABLET ORAL DAILY
Status: DISCONTINUED | OUTPATIENT
Start: 2020-03-30 | End: 2020-04-01 | Stop reason: HOSPADM

## 2020-03-30 RX ORDER — ISOSORBIDE MONONITRATE 60 MG/1
180 TABLET, EXTENDED RELEASE ORAL DAILY
Status: CANCELLED | OUTPATIENT
Start: 2020-03-30

## 2020-03-30 RX ORDER — LISINOPRIL 40 MG/1
40 TABLET ORAL DAILY
Status: DISCONTINUED | OUTPATIENT
Start: 2020-03-30 | End: 2020-04-01 | Stop reason: HOSPADM

## 2020-03-30 RX ORDER — VITAMIN B COMPLEX
1000 TABLET ORAL DAILY
Status: DISCONTINUED | OUTPATIENT
Start: 2020-03-30 | End: 2020-04-01 | Stop reason: HOSPADM

## 2020-03-30 RX ORDER — ROSUVASTATIN CALCIUM 10 MG/1
10 TABLET, COATED ORAL DAILY
Status: CANCELLED | OUTPATIENT
Start: 2020-03-30

## 2020-03-30 RX ORDER — AMLODIPINE BESYLATE 5 MG/1
2.5 TABLET ORAL DAILY
Status: CANCELLED | OUTPATIENT
Start: 2020-03-30

## 2020-03-30 RX ORDER — HYDROCHLOROTHIAZIDE 12.5 MG/1
12.5 CAPSULE, GELATIN COATED ORAL EVERY MORNING
Status: CANCELLED | OUTPATIENT
Start: 2020-03-31

## 2020-03-30 RX ORDER — LEVOTHYROXINE SODIUM 0.07 MG/1
75 TABLET ORAL DAILY
Status: CANCELLED | OUTPATIENT
Start: 2020-03-30

## 2020-03-30 RX ORDER — LISINOPRIL 40 MG/1
40 TABLET ORAL DAILY
Status: CANCELLED | OUTPATIENT
Start: 2020-03-30

## 2020-03-30 RX ORDER — BUPROPION HYDROCHLORIDE 150 MG/1
300 TABLET ORAL EVERY MORNING
Status: DISCONTINUED | OUTPATIENT
Start: 2020-03-31 | End: 2020-04-01 | Stop reason: HOSPADM

## 2020-03-30 RX ADMIN — IOPAMIDOL 80 ML: 755 INJECTION, SOLUTION INTRAVENOUS at 15:10

## 2020-03-30 RX ADMIN — ISOSORBIDE MONONITRATE 180 MG: 60 TABLET, EXTENDED RELEASE ORAL at 18:26

## 2020-03-30 RX ADMIN — LEVOTHYROXINE SODIUM 75 MCG: 0.07 TABLET ORAL at 18:26

## 2020-03-30 RX ADMIN — SODIUM CHLORIDE: 9 INJECTION, SOLUTION INTRAVENOUS at 18:26

## 2020-03-30 RX ADMIN — AMLODIPINE BESYLATE 2.5 MG: 2.5 TABLET ORAL at 18:26

## 2020-03-30 RX ADMIN — LISINOPRIL 40 MG: 40 TABLET ORAL at 18:26

## 2020-03-30 RX ADMIN — MELATONIN 1000 UNITS: at 18:26

## 2020-03-30 RX ADMIN — ROSUVASTATIN CALCIUM 20 MG: 20 TABLET, FILM COATED ORAL at 18:26

## 2020-03-30 RX ADMIN — ASPIRIN 81 MG 81 MG: 81 TABLET ORAL at 18:26

## 2020-03-30 ASSESSMENT — ENCOUNTER SYMPTOMS
DIARRHEA: 0
CONSTIPATION: 0
EYE PAIN: 0
ABDOMINAL PAIN: 0
SHORTNESS OF BREATH: 0
SINUS PAIN: 0
BACK PAIN: 0
COUGH: 0
NAUSEA: 1
COLOR CHANGE: 0
VOMITING: 0
NAUSEA: 0

## 2020-03-30 ASSESSMENT — PAIN SCALES - GENERAL
PAINLEVEL_OUTOF10: 3
PAINLEVEL_OUTOF10: 0
PAINLEVEL_OUTOF10: 0

## 2020-03-30 ASSESSMENT — PAIN DESCRIPTION - LOCATION: LOCATION: NECK;ARM

## 2020-03-30 ASSESSMENT — VISUAL ACUITY: OU: 1

## 2020-03-30 NOTE — ED NOTES
Pt's daughter [facility]) filled out MRI questionnaire. Residents at bedside. Will continue to monitor.       Raymundo Conde RN  03/30/20 8034

## 2020-03-30 NOTE — H&P
by Carl Perez MD at [facility]         Medications Priorto Admission:    Medications Prior to Admission: nitroGLYCERIN (NITROSTAT) 0.4 MG SL tablet, Place 0.4 mg under the tongue every 5 minutes as needed for Chest pain up to max of 3 total doses. If no relief after 1 dose, call 911. gabapentin (NEURONTIN) 100 MG capsule, TK ONE C PO  BID  hydrochlorothiazide (MICROZIDE) 12.5 MG capsule, Take 1 capsule by mouth every morning  buPROPion (WELLBUTRIN XL) 300 MG extended release tablet, Take 1 tablet by mouth every morning  amLODIPine (NORVASC) 2.5 MG tablet, TAKE 1 TABLET BY MOUTH DAILY  rosuvastatin (CRESTOR) 10 MG tablet, Take 1 tablet by mouth daily  isosorbide mononitrate (IMDUR) 120 MG extended release tablet, Take 1.5 tablets by mouth daily  lisinopril (PRINIVIL;ZESTRIL) 40 MG tablet, Take 40 mg by mouth daily  levothyroxine (SYNTHROID) 75 MCG tablet, Take 75 mcg by mouth Daily  vitamin D (CHOLECALCIFEROL) 1000 UNIT TABS tablet, Take 1,000 Units by mouth daily  primidone (MYSOLINE) 50 MG tablet, Take 50 mg by mouth nightly    Allergies:  Patient has no known allergies. Social History:   · TOBACCO:   reports that she has quit smoking. She has never used smokeless tobacco.  · ETOH:   reports current alcohol use. · DRUGS : Denies  · Patient currently lives with family  ·   Family History:       Problem Relation Age of Onset    Heart Disease Mother     Cancer Father        Review of Systems   Constitutional: Negative for fatigue and fever. HENT: Negative for ear pain and sinus pain. Eyes: Positive for visual disturbance. Negative for pain. Respiratory: Negative for cough and shortness of breath. Cardiovascular: Positive for chest pain. Gastrointestinal: Positive for nausea. Negative for abdominal pain, constipation and diarrhea. Endocrine: Negative for polyuria. Genitourinary: Negative for flank pain and pelvic pain.    Musculoskeletal: Positive for arthralgias. Negative for back pain. Skin: Negative for color change. Neurological: Positive for dizziness, speech difficulty, weakness and headaches. Psychiatric/Behavioral: Positive for hallucinations. Negative for agitation, behavioral problems and confusion. ROS: A 10 point review of systems was conducted, significant findings as noted in HPI. Physical Exam  Constitutional:       General: She is awake. She is not in acute distress. Appearance: Normal appearance. HENT:      Head: Normocephalic and atraumatic. Nose: Nose normal.   Eyes:      General: Vision grossly intact. Gaze aligned appropriately. Right eye: No discharge. Left eye: No discharge. Extraocular Movements: Extraocular movements intact. Conjunctiva/sclera: Conjunctivae normal.   Neck:      Musculoskeletal: Full passive range of motion without pain, normal range of motion and neck supple. Cardiovascular:      Rate and Rhythm: Normal rate and regular rhythm. Pulses: Normal pulses. Heart sounds: Normal heart sounds. Pulmonary:      Effort: Pulmonary effort is normal.      Breath sounds: Normal breath sounds. Abdominal:      General: There is no distension. There are no signs of injury. Palpations: Abdomen is soft. Tenderness: There is no abdominal tenderness. Musculoskeletal: Normal range of motion. General: No deformity or signs of injury. Skin:     General: Skin is warm. Neurological:      Mental Status: She is alert, oriented to person, place, and time and easily aroused. Motor: Weakness present. Coordination: Coordination is intact. Gait: Gait is intact. Comments: Patient has pain in left knee area. Left leg weakness raising leg off bed. Dorsiflexion 3/4 left and 4/4 right. Plantarflexion 1/4 bilateral. Sensation to touch diminished on left leg.     Psychiatric:         Attention and Perception: Attention and perception normal.

## 2020-03-30 NOTE — PROGRESS NOTES
Patient admitted to 5504 from ED. Patient alert and oriented, VSS. NIH 7. Bedside swallow performed. Patient oriented to room, given call light and instructed on use. Fall precautions in place. Will continue to monitor.

## 2020-03-30 NOTE — PLAN OF CARE
Problem: Falls - Risk of:  Goal: Will remain free from falls  Description: Will remain free from falls  Outcome: Ongoing  Note: Patient has remained free from falls. Bed is low and locked, bed alarm on, side rails up 2/4, non skid socks on patient. Call light and bedside table are within reach. Patient calls out appropriately. Will continue to monitor. Problem: HEMODYNAMIC STATUS  Goal: Patient has stable vital signs and fluid balance  Outcome: Ongoing  Note: Vital signs stable. Will continue to monitor. Problem: ACTIVITY INTOLERANCE/IMPAIRED MOBILITY  Goal: Mobility/activity is maintained at optimum level for patient  Outcome: Ongoing  Note: Patient ambulating with gait belt and CGA. Will continue to monitor. Problem: COMMUNICATION IMPAIRMENT  Goal: Ability to express needs and understand communication  Outcome: Ongoing  Note: Patient able to express needs and communicate clearly. Will continue to monitor.

## 2020-03-30 NOTE — ED PROVIDER NOTES
history that includes Hysterectomy; Thyroidectomy; Breast lumpectomy; Total knee arthroplasty; Knee cartilage surgery; and skin biopsy (Right, 12/24/2019). Her family history includes Cancer in her father; Heart Disease in her mother. She reports that she has quit smoking. She has never used smokeless tobacco. She reports current alcohol use. She reports that she does not use drugs. Medications     Previous Medications    AMLODIPINE (NORVASC) 2.5 MG TABLET    TAKE 1 TABLET BY MOUTH DAILY    BUPROPION (WELLBUTRIN XL) 300 MG EXTENDED RELEASE TABLET    Take 1 tablet by mouth every morning    CICLOPIROX (PENLAC) 8 % SOLUTION    8 %    GABAPENTIN (NEURONTIN) 100 MG CAPSULE    TK ONE C PO  BID    HYDROCHLOROTHIAZIDE (MICROZIDE) 12.5 MG CAPSULE    Take 1 capsule by mouth every morning    ISOSORBIDE MONONITRATE (IMDUR) 120 MG EXTENDED RELEASE TABLET    Take 1.5 tablets by mouth daily    LEVOTHYROXINE (SYNTHROID) 75 MCG TABLET    Take 75 mcg by mouth Daily    LIDOCAINE (LIDODERM) 5 %    Place 1 patch onto the skin daily 12 hours on, 12 hours off. LISINOPRIL (PRINIVIL;ZESTRIL) 40 MG TABLET    Take 40 mg by mouth daily    PRIMIDONE (MYSOLINE) 50 MG TABLET    Take 50 mg by mouth nightly    ROSUVASTATIN (CRESTOR) 10 MG TABLET    Take 1 tablet by mouth daily    VITAMIN D (CHOLECALCIFEROL) 1000 UNIT TABS TABLET    Take 1,000 Units by mouth daily    ZOSTER RECOMBINANT ADJUVANTED VACCINE (SHINGRIX) 50 MCG/0.5ML SUSR INJECTION    Inject 0.5 mLs into the muscle See Admin Instructions 1 dose now and repeat in 2-6 months       Allergies     She has No Known Allergies. Physical Exam     INITIAL VITALS: BP: 118/69, Temp: 98.1 °F (36.7 °C), Pulse: 66, Resp: 12, SpO2: 97 %   Physical Exam  Vitals signs and nursing note reviewed. Constitutional:       General: She is not in acute distress. Appearance: She is well-developed. HENT:      Head: Normocephalic and atraumatic.    Eyes:      Conjunctiva/sclera: Conjunctivae normal. Neck:      Musculoskeletal: Neck supple. Cardiovascular:      Rate and Rhythm: Normal rate and regular rhythm. Heart sounds: Normal heart sounds. No murmur. No friction rub. No gallop. Pulmonary:      Effort: Pulmonary effort is normal. No respiratory distress. Breath sounds: Normal breath sounds. No wheezing or rales. Abdominal:      General: Bowel sounds are normal. There is no distension. Palpations: Abdomen is soft. Tenderness: There is no abdominal tenderness. There is no guarding or rebound. Skin:     General: Skin is warm and dry. Neurological:      Mental Status: She is alert and oriented to person, place, and time. Comments: Cranial nerves III through XII intact, the patient does have significant visual deficits and is legally blind but she states that her vision is at her baseline; strength 5 out of 5 throughout, sensation intact throughout, disorientation with standing upright and requiring significant assistance with ambulation with what seems like truncal ataxia, her heel to shin was normal         DiagnosticResults     EKG   Interpreted in conjunction with emergencydepartment physician No att. providers found  Rhythm: sinus bradycardia  Rate: bradycardia  Axis: normal  Ectopy: none  Conduction: normal  ST Segments: no acute change  T Waves:non specific changes  Q Waves: none  Clinical Impression: non-specific EKG, sinus bradycardia  Comparison:  No prior EKGs for comparison    RADIOLOGY:  CTA HEAD NECK W CONTRAST   Final Result      1. No aneurysms, vascular occlusions, or intracranial stenoses identified. 2.  No significant stenosis in the extracranial vertebral or carotid arteries. CT HEAD WO CONTRAST   Final Result      1. No acute intracranial abnormality by CT criteria. XR CHEST PORTABLE   Final Result   Impression: No acute cardiopulmonary abnormality.       MRI BRAIN WO CONTRAST    (Results Pending)       LABS:   Results for orders placed or performed during the hospital encounter of 03/30/20   CBC Auto Differential   Result Value Ref Range    WBC 5.4 4.0 - 11.0 K/uL    RBC 4.54 4.00 - 5.20 M/uL    Hemoglobin 12.9 12.0 - 16.0 g/dL    Hematocrit 40.6 36.0 - 48.0 %    MCV 89.4 80.0 - 100.0 fL    MCH 28.5 26.0 - 34.0 pg    MCHC 31.8 31.0 - 36.0 g/dL    RDW 14.3 12.4 - 15.4 %    Platelets 568 195 - 915 K/uL    MPV 8.3 5.0 - 10.5 fL    Neutrophils % 64.6 %    Lymphocytes % 21.1 %    Monocytes % 11.2 %    Eosinophils % 2.5 %    Basophils % 0.6 %    Neutrophils Absolute 3.5 1.7 - 7.7 K/uL    Lymphocytes Absolute 1.1 1.0 - 5.1 K/uL    Monocytes Absolute 0.6 0.0 - 1.3 K/uL    Eosinophils Absolute 0.1 0.0 - 0.6 K/uL    Basophils Absolute 0.0 0.0 - 0.2 K/uL   EKG 12 Lead   Result Value Ref Range    Ventricular Rate 56 BPM    Atrial Rate 56 BPM    P-R Interval 178 ms    QRS Duration 100 ms    Q-T Interval 446 ms    QTc Calculation (Bazett) 430 ms    P Axis 64 degrees    R Axis 27 degrees    T Axis 63 degrees    Diagnosis       EKG performed in ER and to be interpreted by ER physician. Confirmed by MD, ER ([id number]),  Jc Kiser ([id number]) on 3/30/2020 2:26:51 PM       RECENT VITALS:  BP: 118/69, Temp: 98.1 °F (36.7 °C), Pulse: 66,Resp: 12, SpO2: 97 %     Procedures     None    ED Course     Nursing Notes, Past Medical Hx, Past Surgical Hx, Social Hx, Allergies, and Family Hx were reviewed. The patient was given the followingmedications:  No orders of the defined types were placed in this encounter. CONSULTS:  Francisco Kevin / BALAJI / Melony Mary is a 82 y.o. female presenting to the emergency department with ataxia. Her vital signs are normal and stable. At this time, the etiology for her complaints is not entirely clear but I am concerned for underlying stroke. The patient does have significant ataxia noted on her gait examination which would be consistent with potential cerebellar stroke.   It is

## 2020-03-30 NOTE — ED NOTES
Called report to CORETTA Vega; all questions asked were answered.       Allanesha Smith-Narcisse, RN  03/30/20 6169

## 2020-03-30 NOTE — ED NOTES
Home Med List is complete. Source of medications in list is pts daughter. Patient's daughter states that she took both Sunday and Monday's meds accidentally yesterday. And has not had any today    Please note:  Removed from Home Med List:    1. Lidocaine patch   2. Ciclopirox 8% solution    Added to the Home Med List:    1.  Nitroglycerin       3/30/2020 5:22 PM  [address] [facility]

## 2020-03-30 NOTE — TELEPHONE ENCOUNTER
Spoke to pt's daughter in law. I just got this message and it was not routed to me prior to now. She states pt was complaining of headaches, blurred vision, and dizziness starting 3/24 or 3/25 and thought it was due to Gabapentin which is a new medication for her. Pt's daughter in law figured out patient had taken an extra day of medication when she checked her pill case on 3/26. She states she took Gabapentin out of pill case. Pt's daughter in law started to see pt's speech was altered and she is missing words, gait is not steady, weakness with ability to  items, weakness with getting self up off the cough, and patient is shaky. She states pt started saying pt was seeing people who are not there. Today pt is not seeing people anymore. Pt is fearful of not going to hospital due to Covid-19 outbreak. Spoke with patient and recommended she go to the ER. I told her she can wear a mask. I also called the ER and spoke with Nurse Calista Wadsworth to find out if there is a separate entrance for patient due to patient's fears. She informed me that there are 2 separate entrances one for regular ER and one for respiratory. Gave Nurse Calista Wadsworth patient's name and my concern for stroke and she states they will take patient right back when she arrives. Spoke back to patient and informed her and she will go to [facility] ER.

## 2020-03-31 ENCOUNTER — APPOINTMENT (OUTPATIENT)
Dept: MRI IMAGING | Age: 85
End: 2020-03-31
Payer: MEDICARE

## 2020-03-31 LAB
A/G RATIO: 1.7 (ref 1.1–2.2)
ALBUMIN SERPL-MCNC: 3.4 G/DL (ref 3.4–5)
ALP BLD-CCNC: 51 U/L (ref 40–129)
ALT SERPL-CCNC: <5 U/L (ref 10–40)
ANION GAP SERPL CALCULATED.3IONS-SCNC: 9 MMOL/L (ref 3–16)
AST SERPL-CCNC: 13 U/L (ref 15–37)
BASOPHILS ABSOLUTE: 0 K/UL (ref 0–0.2)
BASOPHILS RELATIVE PERCENT: 0.8 %
BILIRUB SERPL-MCNC: 0.3 MG/DL (ref 0–1)
BUN BLDV-MCNC: 10 MG/DL (ref 7–20)
CALCIUM SERPL-MCNC: 9 MG/DL (ref 8.3–10.6)
CHLORIDE BLD-SCNC: 105 MMOL/L (ref 99–110)
CO2: 27 MMOL/L (ref 21–32)
CREAT SERPL-MCNC: 0.8 MG/DL (ref 0.6–1.2)
EOSINOPHILS ABSOLUTE: 0.1 K/UL (ref 0–0.6)
EOSINOPHILS RELATIVE PERCENT: 3.5 %
GFR AFRICAN AMERICAN: >60
GFR NON-AFRICAN AMERICAN: >60
GLOBULIN: 2 G/DL
GLUCOSE BLD-MCNC: 74 MG/DL (ref 70–99)
HCT VFR BLD CALC: 37.5 % (ref 36–48)
HEMOGLOBIN: 12.2 G/DL (ref 12–16)
LV EF: 48 %
LVEF MODALITY: NORMAL
LYMPHOCYTES ABSOLUTE: 1.1 K/UL (ref 1–5.1)
LYMPHOCYTES RELATIVE PERCENT: 25.4 %
MCH RBC QN AUTO: 28.9 PG (ref 26–34)
MCHC RBC AUTO-ENTMCNC: 32.6 G/DL (ref 31–36)
MCV RBC AUTO: 88.8 FL (ref 80–100)
MONOCYTES ABSOLUTE: 0.5 K/UL (ref 0–1.3)
MONOCYTES RELATIVE PERCENT: 12 %
NEUTROPHILS ABSOLUTE: 2.5 K/UL (ref 1.7–7.7)
NEUTROPHILS RELATIVE PERCENT: 58.3 %
PDW BLD-RTO: 14.3 % (ref 12.4–15.4)
PHENOBARBITAL LEVEL: <2.4 UG/ML (ref 15–35)
PLATELET # BLD: 176 K/UL (ref 135–450)
PMV BLD AUTO: 8.4 FL (ref 5–10.5)
POTASSIUM REFLEX MAGNESIUM: 3.6 MMOL/L (ref 3.5–5.1)
RBC # BLD: 4.22 M/UL (ref 4–5.2)
SODIUM BLD-SCNC: 141 MMOL/L (ref 136–145)
TOTAL PROTEIN: 5.4 G/DL (ref 6.4–8.2)
VITAMIN B-12: 648 PG/ML (ref 211–911)
WBC # BLD: 4.2 K/UL (ref 4–11)

## 2020-03-31 PROCEDURE — 85025 COMPLETE CBC W/AUTO DIFF WBC: CPT

## 2020-03-31 PROCEDURE — 80184 ASSAY OF PHENOBARBITAL: CPT

## 2020-03-31 PROCEDURE — 96372 THER/PROPH/DIAG INJ SC/IM: CPT

## 2020-03-31 PROCEDURE — 80188 ASSAY OF PRIMIDONE: CPT

## 2020-03-31 PROCEDURE — 6370000000 HC RX 637 (ALT 250 FOR IP): Performed by: STUDENT IN AN ORGANIZED HEALTH CARE EDUCATION/TRAINING PROGRAM

## 2020-03-31 PROCEDURE — 80053 COMPREHEN METABOLIC PANEL: CPT

## 2020-03-31 PROCEDURE — 83921 ORGANIC ACID SINGLE QUANT: CPT

## 2020-03-31 PROCEDURE — 36415 COLL VENOUS BLD VENIPUNCTURE: CPT

## 2020-03-31 PROCEDURE — 84446 ASSAY OF VITAMIN E: CPT

## 2020-03-31 PROCEDURE — 2580000003 HC RX 258: Performed by: STUDENT IN AN ORGANIZED HEALTH CARE EDUCATION/TRAINING PROGRAM

## 2020-03-31 PROCEDURE — 6360000002 HC RX W HCPCS: Performed by: INTERNAL MEDICINE

## 2020-03-31 PROCEDURE — 82607 VITAMIN B-12: CPT

## 2020-03-31 PROCEDURE — 84425 ASSAY OF VITAMIN B-1: CPT

## 2020-03-31 PROCEDURE — C8929 TTE W OR WO FOL WCON,DOPPLER: HCPCS

## 2020-03-31 PROCEDURE — G0378 HOSPITAL OBSERVATION PER HR: HCPCS

## 2020-03-31 PROCEDURE — 70551 MRI BRAIN STEM W/O DYE: CPT

## 2020-03-31 PROCEDURE — 82746 ASSAY OF FOLIC ACID SERUM: CPT

## 2020-03-31 RX ADMIN — LISINOPRIL 40 MG: 40 TABLET ORAL at 10:24

## 2020-03-31 RX ADMIN — MELATONIN 1000 UNITS: at 10:24

## 2020-03-31 RX ADMIN — HYDROCHLOROTHIAZIDE 12.5 MG: 12.5 CAPSULE ORAL at 10:24

## 2020-03-31 RX ADMIN — ROSUVASTATIN CALCIUM 20 MG: 20 TABLET, FILM COATED ORAL at 10:24

## 2020-03-31 RX ADMIN — SODIUM CHLORIDE: 9 INJECTION, SOLUTION INTRAVENOUS at 06:14

## 2020-03-31 RX ADMIN — AMLODIPINE BESYLATE 2.5 MG: 2.5 TABLET ORAL at 10:24

## 2020-03-31 RX ADMIN — ISOSORBIDE MONONITRATE 180 MG: 60 TABLET, EXTENDED RELEASE ORAL at 10:23

## 2020-03-31 RX ADMIN — ENOXAPARIN SODIUM 40 MG: 40 INJECTION SUBCUTANEOUS at 13:16

## 2020-03-31 RX ADMIN — BUPROPION HYDROCHLORIDE 300 MG: 150 TABLET, FILM COATED, EXTENDED RELEASE ORAL at 10:23

## 2020-03-31 RX ADMIN — ASPIRIN 81 MG 81 MG: 81 TABLET ORAL at 10:24

## 2020-03-31 RX ADMIN — LEVOTHYROXINE SODIUM 75 MCG: 0.07 TABLET ORAL at 06:14

## 2020-03-31 ASSESSMENT — PAIN SCALES - GENERAL: PAINLEVEL_OUTOF10: 0

## 2020-03-31 NOTE — CONSULTS
Vomiting. Genitourinary- No incontinence. No urinary retention  Musculoskeletal- No myalgia. No arthralgia  Skin- No rash. No easy bruising. Psychiatric- + depression. No anxiety  Endocrine- No diabetes. No thyroid issues. Hematologic- No bleeding difficulty. No fatigue  Neurologic- No weakness. No Headache. Exam:  Blood pressure 125/66, pulse 58, temperature 97.7 °F (36.5 °C), temperature source Oral, resp. rate 15, height 5' 4\" (1.626 m), weight 119 lb 6.4 oz (54.2 kg), SpO2 95 %. Constitutional    Vital signs: BP, HR, and RR reviewed   General Alert, no distress, well-nourished  Eyes: Unable to visualize the fundi  Cardiovascular: pulses symmetric in all 4 extremities. No peripheral edema. Psychiatric: cooperative with examination, no  psychotic behavior noted. Neurologic  Mental status: Eyes open spontaneously   orientation to person, place( hospital), month, year   General fund of knowledge grossly intact, able to name preceding president    Memory grossly intact, able to name first president   Attention intact as able to attend well to the exam , able to calculate coins   Language fluent in conversation   Comprehension intact; follows simple commands   Cranial nerves:   CN2: Can see finger movements bilaterally, but unable to identify number of fingers shown. 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 Can see outline and colors, but unable to see details  CN 3,4,6: extraocular muscles intact  CN5: facial sensation symmetric   CN7:face symmetric without dysarthria  CN8: hearing grossly intact  CN9: palate elevated symmetrically  CN11: trap full strength on shoulder shrug  CN12: tongue midline with protrusion  Strength: No prontator drift. BUE 4+/5 , RLE 4+/5, LLE limited by pain but can maintain antigravity  Deep tendon reflexes: Brisk BUEs, +1 BLEs  Sensory: light touch intact in all 4 extremities.  Decreased sensation to pinprick distal BLEs  Cerebellar/coordination: Unable to test given chronic vision loss  Tone: normal in all 4 extremities  Gait: Deferred for safety      Labs  BUN: 10  Creatinine: 0.8  Glucose: 74  LFTs ok  LDL, 2/27/20: 72  TSH: 0.41    UA:   Ref. Range 3/30/2020 14:49   Nitrite, Urine Latest Ref Range: Negative  Negative   Leukocyte Esterase, Urine Latest Ref Range: Negative  TRACE (A)       Studies  CT head  No acute intracranial abnormality by CT criteria    CTA head and neck      1.  No aneurysms, vascular occlusions, or intracranial stenoses identified. 2.  No significant stenosis in the extracranial vertebral or carotid arteries. MRI brain  Age-related changes in the brain.       No acute infarct seen         Impression:  1. Lightheadedness  2. Blurry vision  3. Imbalance  4. Visual hallucinations      Hossein Marin is a 82 y.o. female who presented with blurry vision, difficulty balancing, lightheadedness, and visual hallucinations. Vessel imaging and MRI unrevealing. Do not believe symptoms have a vascular etiology. It seems that most of her complaints and symptoms are actually chronic problems per chart review. It's possible that the patient had visual hallucinations due to gabapentin use and then perhaps acutely decompensated from there. Recommendations:  - MRI C spine , however this does not have to be done while inpatient  - Start Thiamine replacement  - Check serum studies including  folate, methylmalonic acid, vitamin E,  Copper (ordered)  - Await pending serum studies  - OK to restart Primidone.  Will check Primidone and phenobarbital level  - Follow up with Movement disorder specialist shortly after discharge     A copy of this note was provided for MD Sukhdev Martinez [address] Neurology  [phone]  Evenings, weekends, and off weeks please discuss neurologist on-call

## 2020-03-31 NOTE — CARE COORDINATION
Case Management Assessment           Initial Evaluation                Date / Time of Evaluation: 3/31/2020 2:12 PM                 Assessment Completed by: Santo Hatfield     Spoke with patient regarding discharge needs. She has yet to see therapy but per Alban Vinson RN she is up [address]. Pt is from home with her daughter and feels almost back to her norm but she is also awaiting therapy. Her family will be able to pick her up at d/c and she does not feel that she has any home care or DME needs currently. Patient Name: Clari Hernandez     YOB: 1935  Diagnosis: Acute cerebrovascular accident (CVA) [facility]) [I63.9]  Acute cerebrovascular accident (CVA) [facility]) [I63.9]     Date / Time: 3/30/2020  1:47 PM    Patient Admission Status: Observation    If patient is discharged prior to next notation, then this note serves as note for discharge by case management.      Current PCP: Shine Lucero MD  Clinic Patient: No    Chart Reviewed: Yes  Patient/ Family Interviewed: Yes    Initial assessment completed at bedside with: patient    Hospitalization in the last 30 days: No    Emergency Contacts:  Extended Emergency Contact Information  Primary Emergency Contact: Arabella connor  Home Phone: [phone]  Relation: Child    Advance Directives:   Code Status: Prior    [address]: Yes  Agent: [place]  Number: [phone]    Copy present: Yes     In paper Chart: No    Scanned into EMR Yes    Financial  Payor: Erika Serna / Plan: [employer] / Product Type: *No Product type* /     Pre-cert required for SNF: Yes    Pharmacy    [address], [address] [place] [address] [phone] - f [phone]  [address] [place] [address]  Phone: [phone] Fax: TORIBIO Sánchez [phone], [address] [phone] - f [phone]  [facility] [address]  Phone: the patient's individualized plan of care/goals and shares the quality data associated with the providers.  Not Indicated      Care Transition patient: No    Leobardo Craven  Case Management Department  Ph: [phone]   Fax: [phone]

## 2020-04-01 ENCOUNTER — APPOINTMENT (OUTPATIENT)
Dept: GENERAL RADIOLOGY | Age: 85
End: 2020-04-01
Payer: MEDICARE

## 2020-04-01 VITALS
DIASTOLIC BLOOD PRESSURE: 78 MMHG | WEIGHT: 119.4 LBS | HEIGHT: 64 IN | SYSTOLIC BLOOD PRESSURE: 132 MMHG | BODY MASS INDEX: 20.38 KG/M2 | TEMPERATURE: 98 F | RESPIRATION RATE: 16 BRPM | HEART RATE: 57 BPM | OXYGEN SATURATION: 96 %

## 2020-04-01 LAB
ANION GAP SERPL CALCULATED.3IONS-SCNC: 8 MMOL/L (ref 3–16)
BUN BLDV-MCNC: 11 MG/DL (ref 7–20)
CALCIUM SERPL-MCNC: 9.2 MG/DL (ref 8.3–10.6)
CHLORIDE BLD-SCNC: 106 MMOL/L (ref 99–110)
CO2: 26 MMOL/L (ref 21–32)
CREAT SERPL-MCNC: 0.8 MG/DL (ref 0.6–1.2)
FOLATE: 18.65 NG/ML (ref 4.78–24.2)
GFR AFRICAN AMERICAN: >60
GFR NON-AFRICAN AMERICAN: >60
GLUCOSE BLD-MCNC: 69 MG/DL (ref 70–99)
POTASSIUM SERPL-SCNC: 4.1 MMOL/L (ref 3.5–5.1)
SODIUM BLD-SCNC: 140 MMOL/L (ref 136–145)

## 2020-04-01 PROCEDURE — 96372 THER/PROPH/DIAG INJ SC/IM: CPT

## 2020-04-01 PROCEDURE — 74230 X-RAY XM SWLNG FUNCJ C+: CPT

## 2020-04-01 PROCEDURE — 36415 COLL VENOUS BLD VENIPUNCTURE: CPT

## 2020-04-01 PROCEDURE — G0378 HOSPITAL OBSERVATION PER HR: HCPCS

## 2020-04-01 PROCEDURE — 97116 GAIT TRAINING THERAPY: CPT

## 2020-04-01 PROCEDURE — 97535 SELF CARE MNGMENT TRAINING: CPT

## 2020-04-01 PROCEDURE — 97530 THERAPEUTIC ACTIVITIES: CPT

## 2020-04-01 PROCEDURE — 97165 OT EVAL LOW COMPLEX 30 MIN: CPT

## 2020-04-01 PROCEDURE — 6370000000 HC RX 637 (ALT 250 FOR IP): Performed by: STUDENT IN AN ORGANIZED HEALTH CARE EDUCATION/TRAINING PROGRAM

## 2020-04-01 PROCEDURE — 92523 SPEECH SOUND LANG COMPREHEN: CPT

## 2020-04-01 PROCEDURE — 80048 BASIC METABOLIC PNL TOTAL CA: CPT

## 2020-04-01 PROCEDURE — 6360000002 HC RX W HCPCS: Performed by: INTERNAL MEDICINE

## 2020-04-01 PROCEDURE — 97162 PT EVAL MOD COMPLEX 30 MIN: CPT

## 2020-04-01 PROCEDURE — 92610 EVALUATE SWALLOWING FUNCTION: CPT

## 2020-04-01 RX ORDER — ROSUVASTATIN CALCIUM 20 MG/1
20 TABLET, COATED ORAL DAILY
Qty: 30 TABLET | Refills: 3 | Status: SHIPPED | OUTPATIENT
Start: 2020-04-02 | End: 2020-07-06 | Stop reason: SDUPTHER

## 2020-04-01 RX ORDER — ASPIRIN 81 MG/1
81 TABLET, CHEWABLE ORAL DAILY
Qty: 30 TABLET | Refills: 3 | Status: SHIPPED | OUTPATIENT
Start: 2020-04-02 | End: 2020-05-28 | Stop reason: DRUGHIGH

## 2020-04-01 RX ORDER — LANOLIN ALCOHOL/MO/W.PET/CERES
100 CREAM (GRAM) TOPICAL DAILY
Qty: 30 TABLET | Refills: 3 | Status: SHIPPED | OUTPATIENT
Start: 2020-04-01 | End: 2020-05-28

## 2020-04-01 RX ADMIN — ENOXAPARIN SODIUM 40 MG: 40 INJECTION SUBCUTANEOUS at 10:13

## 2020-04-01 RX ADMIN — ISOSORBIDE MONONITRATE 180 MG: 60 TABLET, EXTENDED RELEASE ORAL at 10:13

## 2020-04-01 RX ADMIN — MELATONIN 1000 UNITS: at 10:13

## 2020-04-01 RX ADMIN — ROSUVASTATIN CALCIUM 20 MG: 20 TABLET, FILM COATED ORAL at 10:16

## 2020-04-01 RX ADMIN — LISINOPRIL 40 MG: 40 TABLET ORAL at 10:13

## 2020-04-01 RX ADMIN — LEVOTHYROXINE SODIUM 75 MCG: 0.07 TABLET ORAL at 06:40

## 2020-04-01 RX ADMIN — ASPIRIN 81 MG 81 MG: 81 TABLET ORAL at 10:14

## 2020-04-01 RX ADMIN — HYDROCHLOROTHIAZIDE 12.5 MG: 12.5 CAPSULE ORAL at 10:14

## 2020-04-01 RX ADMIN — AMLODIPINE BESYLATE 2.5 MG: 2.5 TABLET ORAL at 10:14

## 2020-04-01 RX ADMIN — BUPROPION HYDROCHLORIDE 300 MG: 150 TABLET, FILM COATED, EXTENDED RELEASE ORAL at 10:13

## 2020-04-01 ASSESSMENT — ENCOUNTER SYMPTOMS
BACK PAIN: 0
DIARRHEA: 0
CONSTIPATION: 0
COUGH: 0
COLOR CHANGE: 0
EYE PAIN: 0
ABDOMINAL PAIN: 0
SHORTNESS OF BREATH: 0
SINUS PAIN: 0

## 2020-04-01 ASSESSMENT — PAIN SCALES - GENERAL: PAINLEVEL_OUTOF10: 0

## 2020-04-01 ASSESSMENT — VISUAL ACUITY: OU: 1

## 2020-04-01 NOTE — PROGRESS NOTES
disease. has a past surgical history that includes Hysterectomy; Thyroidectomy; Breast lumpectomy; Total knee arthroplasty; Knee cartilage surgery; and skin biopsy (Right, 12/24/2019). Treatment Diagnosis: impaired ADLs      Restrictions  Position Activity Restriction  Other position/activity restrictions: no activity restrictions noted    Subjective   General  Chart Reviewed: Yes  Additional Pertinent Hx: PMH:  cerebral infarct, thyroid dz, HTN, hyperlipidemia, hx of alcohol abuse, TKR, macular degeneration  Family / Caregiver Present: No  Referring Practitioner: Valdemar Garcia DO  Diagnosis: Pt admitted with ~1 week history of ataxia and word finding difficulties, and most recenty-hallucinations -CXR=neg, head CT=neg, CTA head/neck=no aneurysms, vascular occlusions or intracranial stenoses  Subjective  Subjective: Pt in bed, agreeable to work with OT. Patient Currently in Pain: No  Vital Signs  Patient Currently in Pain: No  Social/Functional History  Social/Functional History  Lives With: Family(son & dtr-in-law)  Type of Home: House  Home Layout: One level(with finished basement where her bedroom is)  Home Access: Stairs to enter without rails  Entrance Stairs - Number of Steps: 3; flight to lower level where her bedroom is (13 + 2 with rail)  Bathroom Shower/Tub: Walk-in shower  Bathroom Toilet: Standard(sink next to)  Bathroom Equipment: Built-in shower seat  Home Equipment: Cane  ADL Assistance: Independent  Homemaking Assistance: Needs assistance(does her own laundry, family cooks dinner & does cleaning)  Ambulation Assistance: Independent(occasionally family A on the steps)  Transfer Assistance: Independent  Active : No  Leisure & Hobbies: you tube; likes to cook  Additional Comments: 1 fall- misjudged a chair (6 months ago). Someone is home with her all the time.         Objective        Orientation  Overall Orientation Status: Within Functional Limits     Balance  Sitting Balance: Minutes 43              Timed Code Treatment Minutes:   28    Total Treatment Minutes:  [address] [facility], OTR/L 94 31 11

## 2020-04-01 NOTE — PROGRESS NOTES
safety. 4/1: Educated pt to purpose of visit, s/s of aspiration, concern if aspiration occurs, rationale for MBS. Pt stated understanding and agreeable  Cont goal    General  Chart Reviewed: Yes  Behavior/Cognition: Alert; Cooperative;Pleasant mood  Respiratory Status: Room air  Communication Observation: Functional  Follows Directions: Complex  Dentition: Some missing teeth  Patient Positioning: Upright in bed  Baseline Vocal Quality: Normal  Volitional Cough: Strong  Prior Dysphagia History: pt states she has difficulty with liquids, \" the epiglottis doesn't covere my airway. \"  Pt reports having a \"cookie test\"  in 2018  Consistencies Administered: Reg solid; Dysphagia Minced and Moist (Dysphagia II); Thin - straw; Thin - cup    Vision/Hearing  Vision  Vision: Impaired  Vision Exceptions: Legally blind(pt states she had macular degeneration when she was born)  Hearing  Hearing: Exceptions to [facility]  Hearing Exceptions: Hard of hearing/hearing concerns;Right hearing aid    Oral Motor Deficits  Oral/Motor  Oral Motor: Within functional limits    Oral Phase Dysfunction  mastication is slow but functional, pt states this is due to missing dentition     Indicators of Pharyngeal Phase Dysfunction  Pt exhibited no overt signs of aspiration, voice remained clear    Prognosis  Prognosis  Prognosis for safe diet advancement: fair  Individuals consulted  Consulted and agree with results and recommendations: Patient;RN    Education  Patient Education: Pt educated to purpose of visit  Patient Education Response: Verbalizes understanding  Safety Devices in place: Yes  Type of devices: Call light within reach       Therapy Time  SLP Individual Minutes  Time In: 8467  Time Out: 6648  Minutes: 14     Plan:  Recommended diet:  TBD after MBS  MBS Today  Pt therapy goal: I would like to figure this out (re swallowing)  Pt dc goal: to find out why I have these problems    Brenda Evans M.S./[facility]-SLP #[id number]  Pg.  # [phone]  Needs met prior

## 2020-04-01 NOTE — PROGRESS NOTES
Progress Note    Admit Date: 3/30/2020  Day: 2  Diet: DIET GENERAL;    CC: Blurred vision, left leg pain/weakness    Interval history: No overnight events. This AM vitals are stable and WNL (-144/77-86, T 98.0, RR 16, HR 57-65, SaO2 95-96% room air). PVCs and some bradycardia noted on telemetry. No urine output or bowel movements recorded over the last 24 hours. Low blood glucose (69). This AM patient has no acute complaints. She denies chest pain, SOB, abdominal pain, nausea, vomiting, diarrhea and constipation. Medications:     Scheduled Meds:   enoxaparin  40 mg Subcutaneous Daily    amLODIPine  2.5 mg Oral Daily    buPROPion  300 mg Oral QAM    hydrochlorothiazide  12.5 mg Oral QAM    isosorbide mononitrate  180 mg Oral Daily    levothyroxine  75 mcg Oral Daily    lisinopril  40 mg Oral Daily    [Held by provider] primidone  50 mg Oral Nightly    rosuvastatin  20 mg Oral Daily    Vitamin D  1,000 Units Oral Daily    aspirin  81 mg Oral Daily     Continuous Infusions:   sodium chloride 100 mL/hr at 03/31/20 0614     PRN Meds:perflutren lipid microspheres    Objective:   Vitals:   T-max:  Patient Vitals for the past 8 hrs:   BP Temp Temp src Pulse Resp SpO2   04/01/20 1117 132/78 98 °F (36.7 °C) Oral -- 16 --   04/01/20 0715 135/77 98 °F (36.7 °C) Oral 57 16 96 %       Intake/Output Summary (Last 24 hours) at 4/1/2020 1122  Last data filed at 4/1/2020 0958  Gross per 24 hour   Intake 1020 ml   Output --   Net 1020 ml     Review of Systems   Constitutional: Negative for fatigue and fever. HENT: Negative for ear pain and sinus pain. Eyes: Negative for pain. Respiratory: Negative for cough and shortness of breath. Cardiovascular: Negative for chest pain. Gastrointestinal: Negative for abdominal pain, constipation and diarrhea. Endocrine: Negative for polyuria. Genitourinary: Negative for flank pain and pelvic pain. Musculoskeletal: Negative for back pain.    Skin: Negative for color change. Neurological: Positive for tremors. Negative for dizziness and headaches. Psychiatric/Behavioral: Negative for agitation, behavioral problems and confusion. Physical Exam  Constitutional:       General: She is awake. She is not in acute distress. Appearance: Normal appearance. HENT:      Head: Normocephalic and atraumatic. Nose: Nose normal.   Eyes:      General: Vision grossly intact. Gaze aligned appropriately. Right eye: No discharge. Left eye: No discharge. Extraocular Movements: Extraocular movements intact. Conjunctiva/sclera: Conjunctivae normal.   Neck:      Musculoskeletal: Full passive range of motion without pain, normal range of motion and neck supple. Cardiovascular:      Rate and Rhythm: Normal rate and regular rhythm. Pulses: Normal pulses. Heart sounds: Normal heart sounds. Pulmonary:      Effort: Pulmonary effort is normal.      Breath sounds: Normal breath sounds. Abdominal:      General: There is no distension. There are no signs of injury. Palpations: Abdomen is soft. Tenderness: There is no abdominal tenderness. Musculoskeletal: Normal range of motion. General: No deformity or signs of injury. Skin:     General: Skin is warm. Neurological:      General: No focal deficit present. Mental Status: She is alert, oriented to person, place, and time and easily aroused. Mental status is at baseline. Motor: Motor function is intact. Coordination: Coordination is intact. Gait: Gait is intact. Psychiatric:         Attention and Perception: Attention and perception normal.         Mood and Affect: Mood and affect normal.         Speech: Speech normal.         Behavior: Behavior normal. Behavior is cooperative. Thought Content:  Thought content normal.         Cognition and Memory: Cognition normal.         Judgment: Judgment normal.       LABS:    CBC:   Recent Labs 03/30/20  1429 03/31/20  0502   WBC 5.4 4.2   HGB 12.9 12.2   HCT 40.6 37.5    176   MCV 89.4 88.8     Renal:    Recent Labs     03/30/20  1429 03/31/20  0502 04/01/20  0434    141 140   K 3.6 3.6 4.1   CL 98* 105 106   CO2 28 27 26   BUN 11 10 11   CREATININE 0.8 0.8 0.8   GLUCOSE 94 74 69*   CALCIUM 9.4 9.0 9.2   ANIONGAP 11 9 8     Hepatic:   Recent Labs     03/30/20  1429 03/31/20  0502   AST 17 13*   ALT 7* <5*   BILITOT 0.4 0.3   PROT 6.1* 5.4*   LABALBU 4.0 3.4   ALKPHOS 57 51     Troponin:   Recent Labs     03/30/20  1429 03/30/20  1919   TROPONINI <0.01 <0.01     BNP: No results for input(s): BNP in the last 72 hours. Lipids: No results for input(s): CHOL, HDL in the last 72 hours. Invalid input(s): LDLCALCU, TRIGLYCERIDE  ABGs:  No results for input(s): PHART, DCO0CKV, PO2ART, MEB6DSX, BEART, THGBART, S8YOZYRI, ZOJ6FQX in the last 72 hours. INR:   Recent Labs     03/30/20  1430   INR 0.95     Lactate: No results for input(s): LACTATE in the last 72 hours. Cultures:  -----------------------------------------------------------------  RAD:   FL MODIFIED BARIUM SWALLOW W VIDEO   Final Result      No penetration or aspiration. MRI BRAIN WO CONTRAST   Final Result      Age-related changes in the brain. No acute infarct seen. CTA HEAD NECK W CONTRAST   Final Result      1. No aneurysms, vascular occlusions, or intracranial stenoses identified. 2.  No significant stenosis in the extracranial vertebral or carotid arteries. CT HEAD WO CONTRAST   Final Result      1. No acute intracranial abnormality by CT criteria. XR CHEST PORTABLE   Final Result   Impression: No acute cardiopulmonary abnormality. Assessment/Plan:     82F w/ PMH of CAD, macular degeneration, cancer, cerebral infarction, hearing loss, alcohol abuse, HLD, HTN, HLD, depression and thyroid disease presented 03/30/20 w/ new-onset vision and motor symptoms x 6 days.      1.  New-onset motor, sensory, speech deficits  - Etiology unclear. TIA vs. medication effect vs. multiple system atrophy  - History of CVA (2018) w/ residual deficits. - CT-head (03/30/20): No acute findings.  - CTa-head/neck (03/30/20): No acute findings.  - MRI-brain (03/31/20): No acute findings.  - TTE (03/31/2020): No evidence for shunt w/ bubble study.  - MBS (04/01/20): Negative for aspiration/penetration.  - Plan: Rosuvastatin 20 mg QD. ASA 81 mg QD. F/U folate, MMA labs. Plan for MRI-cervical (outpatient). Refer to movement disorder specialist (outpatient). 2. HTN  - -144/77-86 today. - Plan: Lisinopril 40 mg QD. Amlodipine 2.5 mg QD. HCTZ 12.5 mg QD. Imdur 180 mg QD.     3. CAD history  - Cath in 2014 showed minimal CAD + normal LVEDP. - Nuclear stress test in 2016 WNL. - Plan: Telemetry. Rosuvastatin 20 mg QD. ASA 81 mg QD.     4. Hypothyroidism  - S/P thyroidectomy.  - TSH 0.41 (02/27/20). - Synthroid 75 mcg QD.     5. Depression  - Buproprion 300 mg QD.      6. Supplements  - Vit D 1000 units QD.     7. PPX  - Lovenox 40 mg QD.     Code Status: Prior  FEN: General   DISPO: [address], , PGY-1  04/01/20  11:22 AM    This patient has been staffed and discussed with Michelle Dumont MD.

## 2020-04-01 NOTE — CARE COORDINATION
their way out of the hospital at discharge, or pharmacy can deliver to the bedside if staff is available. (payment due at time of pick-up or delivery - cash, check, or card accepted)     Able to afford home medications/ co-pay costs: Yes    ADLS:  Current PT AM-PAC Score: 20 /24  Current OT AM-PAC Score: 21 /24      DISCHARGE Disposition: Home- No Services Needed    LOC at discharge: Not Applicable  JULIEN Completed: Not Indicated    Notification completed in HENS/PAS?:  Not Applicable    IMM Completed:   Not Indicated    Transportation:  Transportation PLAN for discharge: family   Mode of Transport: Private Car      The Plan for Transition of Care is related to the following treatment goals of Acute cerebrovascular accident (CVA) ([facility] [address] [I63.9]  Acute cerebrovascular accident (CVA) ([facility] [address] [I63.9]    The Patient and/or patient representative Filomena and her family were provided with a choice of provider and agrees with the discharge plan Not Indicated    Freedom of choice list was provided with basic dialogue that supports the patient's individualized plan of care/goals and shares the quality data associated with the providers.  Not Indicated    Care Transitions patient: No    Cliff Casillas RN  [address]  Case Management Department  Ph: [phone]  Fax: [phone]

## 2020-04-01 NOTE — PROGRESS NOTES
misjudged a chair (6 months ago). Someone is home with her all the time. Cognition        Objective          AROM RLE (degrees)  RLE AROM: WNL  AROM LLE (degrees)  LLE AROM : WNL  LLE General AROM: reports some pain with L knee extension (states possibly from old TKR)  Strength RLE  Strength RLE: WNL  Strength LLE  Strength LLE: WNL     Sensation  Overall Sensation Status: Impaired(occasional L hand tingling)  Bed mobility  Supine to Sit: Independent(HOB elev 30 degrees without rail (has adjustable bed at home))  Transfers  Sit to Stand: Stand by assistance(from bed & chair)  Stand to sit: Stand by assistance  Ambulation  Ambulation?: Yes  Ambulation 1  Surface: level tile  Device: No Device  Assistance: Stand by assistance  Quality of Gait: slow but steady gait; some cues due to low vision & unfamiliar with hospital surroundings; tends to keep arms clasped together or resting on thighs- encouraged to keep at sides for balance & natural arm swing  Gait Deviations: Decreased arm swing  Distance: 10', 20', 75'  Comments: Pt declined trying walker stating she does not want to be reliant on one & feels she does well without. Stairs/Curb  Stairs?: Yes(up/down 11 steps with 1 rail CGA for safety- with 1 LOB stepping down to bottom step requiring min A. Encouraged to have family A on steps at all times at home. Issued gait belt for use by family. RN aware of family needing to A on steps & will pass on.)     Balance  Sitting - Static: Good  Sitting - Dynamic: Good    Treatment included gait/transfer training, pt education.       Plan   Plan  Times per week: 2-5  Current Treatment Recommendations: Transfer Training, Strengthening, Balance Training, Gait Training, Functional Mobility Training, Stair training, Safety Education & Training, Patient/Caregiver Education & Training  Safety Devices  Type of devices: Call light within reach, Chair alarm in place, Left in chair, Nurse notified

## 2020-04-01 NOTE — PROGRESS NOTES
Pt alert and oriented x4, VSS, IV fluids infusing in right arm. NIH score of 2. Pt is legally blind and unable to read the hand outs. Pt has left sided weakness. Bed alarm on, bedside table and call light in reach.

## 2020-04-02 ENCOUNTER — TELEPHONE (OUTPATIENT)
Dept: PRIMARY CARE CLINIC | Age: 85
End: 2020-04-02

## 2020-04-03 LAB
METHYLMALONIC ACID: 0.13 UMOL/L (ref 0–0.4)
PHENOBARBITAL LEVEL: 1.2 UG/ML (ref 15–40)
PRIMIDONE LEVEL: <2.5 UG/ML (ref 5–12)

## 2020-04-04 LAB — VITAMIN B1 WHOLE BLOOD: 96 NMOL/L (ref 70–180)

## 2020-04-05 LAB
ALPHA-TOCOPHEROL: 11.3 MG/L (ref 5.5–18)
GAMMA-TOCOPHEROL: 0.7 MG/L (ref 0–6)

## 2020-04-14 RX ORDER — PRIMIDONE 50 MG/1
50 TABLET ORAL NIGHTLY
Qty: 90 TABLET | Refills: 0 | Status: SHIPPED | OUTPATIENT
Start: 2020-04-14 | End: 2020-09-25 | Stop reason: SDUPTHER

## 2020-04-14 NOTE — TELEPHONE ENCOUNTER
Patient requesting a medication refill.   Medication: primidone (MYSOLINE) 50 MG tablet [[id number]]       Pharmacy: [facility] [address] [place] [address] Drive - F [phone]    Next office visit: Visit date not found

## 2020-04-20 RX ORDER — LIDOCAINE 50 MG/G
PATCH TOPICAL
Qty: 30 PATCH | Refills: 2 | Status: SHIPPED | OUTPATIENT
Start: 2020-04-20 | End: 2020-05-28

## 2020-05-04 RX ORDER — BUPROPION HYDROCHLORIDE 300 MG/1
300 TABLET ORAL EVERY MORNING
Qty: 30 TABLET | Refills: 3 | Status: SHIPPED | OUTPATIENT
Start: 2020-05-04 | End: 2020-05-28

## 2020-05-04 NOTE — TELEPHONE ENCOUNTER
Medication:   Requested Prescriptions     Pending Prescriptions Disp Refills    buPROPion (WELLBUTRIN XL) 300 MG extended release tablet 30 tablet 1     Sig: Take 1 tablet by mouth every morning        Last Filled:      Patient Phone Number: [phone] (home)     Last appt: 3/10/2020   Next appt: Visit date not found    Last OARRS: No flowsheet data found.     Preferred Pharmacy:   [address] [phone] -  [phone]  [address] [place] [address]  Phone: [phone] Fax: [phone]  Vladimir Siu [address], [address] [place] [address] [phone] - f [phone]  [place] [address]  Phone: [phone] Fax: [phone]

## 2020-05-26 ENCOUNTER — TELEPHONE (OUTPATIENT)
Dept: PRIMARY CARE CLINIC | Age: 85
End: 2020-05-26

## 2020-05-26 NOTE — TELEPHONE ENCOUNTER
Paperwork reviewed and patient needs appointment for preadmission visit. She will also need a PPD and chest xray.

## 2020-05-28 ENCOUNTER — TELEPHONE (OUTPATIENT)
Dept: PRIMARY CARE CLINIC | Age: 85
End: 2020-05-28

## 2020-05-28 ENCOUNTER — OFFICE VISIT (OUTPATIENT)
Dept: PRIMARY CARE CLINIC | Age: 85
End: 2020-05-28
Payer: MEDICARE

## 2020-05-28 VITALS
TEMPERATURE: 98.1 F | SYSTOLIC BLOOD PRESSURE: 138 MMHG | RESPIRATION RATE: 15 BRPM | WEIGHT: 117 LBS | BODY MASS INDEX: 19.97 KG/M2 | HEART RATE: 63 BPM | OXYGEN SATURATION: 98 % | HEIGHT: 64 IN | DIASTOLIC BLOOD PRESSURE: 80 MMHG

## 2020-05-28 PROCEDURE — G8400 PT W/DXA NO RESULTS DOC: HCPCS | Performed by: INTERNAL MEDICINE

## 2020-05-28 PROCEDURE — 4040F PNEUMOC VAC/ADMIN/RCVD: CPT | Performed by: INTERNAL MEDICINE

## 2020-05-28 PROCEDURE — G8427 DOCREV CUR MEDS BY ELIG CLIN: HCPCS | Performed by: INTERNAL MEDICINE

## 2020-05-28 PROCEDURE — 1090F PRES/ABSN URINE INCON ASSESS: CPT | Performed by: INTERNAL MEDICINE

## 2020-05-28 PROCEDURE — 1123F ACP DISCUSS/DSCN MKR DOCD: CPT | Performed by: INTERNAL MEDICINE

## 2020-05-28 PROCEDURE — 1036F TOBACCO NON-USER: CPT | Performed by: INTERNAL MEDICINE

## 2020-05-28 PROCEDURE — 99215 OFFICE O/P EST HI 40 MIN: CPT | Performed by: INTERNAL MEDICINE

## 2020-05-28 PROCEDURE — G8420 CALC BMI NORM PARAMETERS: HCPCS | Performed by: INTERNAL MEDICINE

## 2020-05-28 RX ORDER — IPRATROPIUM BROMIDE 21 UG/1
2 SPRAY, METERED NASAL 2 TIMES DAILY PRN
Qty: 1 BOTTLE | Refills: 0 | Status: SHIPPED | OUTPATIENT
Start: 2020-05-28 | End: 2020-06-23

## 2020-05-28 RX ORDER — LISINOPRIL 40 MG/1
40 TABLET ORAL DAILY
Qty: 90 TABLET | Refills: 1 | Status: SHIPPED | OUTPATIENT
Start: 2020-05-28 | End: 2020-06-23 | Stop reason: ALTCHOICE

## 2020-05-28 RX ORDER — ISOSORBIDE MONONITRATE 60 MG/1
60 TABLET, EXTENDED RELEASE ORAL DAILY
COMMUNITY
Start: 2020-05-28 | End: 2020-06-23

## 2020-05-28 RX ORDER — AMLODIPINE BESYLATE 2.5 MG/1
2.5 TABLET ORAL DAILY
Qty: 90 TABLET | Refills: 1 | Status: SHIPPED | OUTPATIENT
Start: 2020-05-28 | End: 2020-06-15

## 2020-05-28 RX ORDER — ASPIRIN 81 MG/1
81 TABLET ORAL DAILY
COMMUNITY
Start: 2020-05-28

## 2020-05-28 ASSESSMENT — ENCOUNTER SYMPTOMS
RHINORRHEA: 1
BLOOD IN STOOL: 0
SINUS PRESSURE: 0
WHEEZING: 0
SHORTNESS OF BREATH: 0
NAUSEA: 0
CHEST TIGHTNESS: 0
CONSTIPATION: 0
DIARRHEA: 0
COUGH: 0
SORE THROAT: 0
VOMITING: 0
TROUBLE SWALLOWING: 0
ABDOMINAL PAIN: 0

## 2020-05-28 NOTE — TELEPHONE ENCOUNTER
Called to schedule a 3 mos AWV for the pt. Her daughter-in-law says that she will not be handling her appointments anymore. She couldn't say anything to Dr. Loan Villanueva this morning, but the pt has taken a change for the worse. She is accusing Missy Fellers of stealing. She wanted to move out. They are putting her in an assisted living facility. Ms. Mary Ramires will assist Dr. Loan Villanueva with as much as she can, but her mother-in-law is being obstinate. Edgar Harrison

## 2020-05-28 NOTE — PROGRESS NOTES
shortness of breath and wheezing. Cardiovascular: Negative for chest pain, palpitations and leg swelling. Gastrointestinal: Negative for abdominal distention, abdominal pain, anal bleeding, blood in stool, constipation, diarrhea, nausea, rectal pain and vomiting. Endocrine: Positive for cold intolerance. Negative for heat intolerance. Genitourinary: Negative for decreased urine volume, difficulty urinating, dysuria, flank pain, frequency, hematuria and urgency. Musculoskeletal: Positive for arthralgias, back pain (chronic) and neck pain (chronic). Negative for gait problem, joint swelling, myalgias and neck stiffness. Skin: Negative for rash and wound. Neurological: Positive for tremors and numbness. Negative for dizziness, seizures, syncope, facial asymmetry, speech difficulty, weakness, light-headedness and headaches. Hematological: Negative for adenopathy. Does not bruise/bleed easily. Psychiatric/Behavioral: Positive for dysphoric mood. Negative for decreased concentration and sleep disturbance. The patient is not nervous/anxious. All other systems reviewed and are negative.       Past Medical History:   Diagnosis Date    CAD (coronary artery disease)     Cancer ([facility]     breast    Cerebral artery occlusion with cerebral infarction ([facility]     Chronic mid back pain     Hearing deficit     History of alcohol abuse     IN THE PAST    History of breast cancer     Hyperlipidemia     Hypertension     Macular degeneration     Medial meniscus tear     Mixed hyperlipidemia 9/17/2019    Osteoarthritis of knee     Osteoporosis     Peripheral neuropathy     Postsurgical hypothyroidism     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    Retinal dystrophy     Tremor        Past Surgical History:   Procedure Laterality Date    BREAST LUMPECTOMY      bilateral    HYSTERECTOMY      KNEE CARTILAGE SURGERY      SKIN BIOPSY Right 12/24/2019    EXCISION OF SOFT TISSUE MASS RIGHT BACK AND EXCISION OF SKIN Reflexes are normal and symmetric. Psychiatric:         Mood and Affect: Mood normal.         Speech: Speech normal.           Lab Results   Component Value Date     04/01/2020    K 4.1 04/01/2020     04/01/2020    CO2 26 04/01/2020    BUN 11 04/01/2020    CREATININE 0.8 04/01/2020    GLUCOSE 69 (L) 04/01/2020    CALCIUM 9.2 04/01/2020    PROT 5.4 (L) 03/31/2020    LABALBU 3.4 03/31/2020    BILITOT 0.3 03/31/2020    ALKPHOS 51 03/31/2020    AST 13 (L) 03/31/2020    ALT <5 (L) 03/31/2020    LABGLOM >60 04/01/2020    GFRAA >60 04/01/2020    AGRATIO 1.7 03/31/2020    GLOB 2.0 03/31/2020     Lab Results   Component Value Date    CHOL 180 02/27/2020    TRIG 49 02/27/2020    HDL 98 (H) 02/27/2020    LDLCALC 72 02/27/2020     Lab Results   Component Value Date    TSH 0.41 02/27/2020     Lab Results   Component Value Date    WBC 4.2 03/31/2020    RBC 4.22 03/31/2020    HGB 12.2 03/31/2020    HCT 37.5 03/31/2020    MCV 88.8 03/31/2020    MCH 28.9 03/31/2020    MCHC 32.6 03/31/2020    RDW 14.3 03/31/2020     03/31/2020    MPV 8.4 03/31/2020    NEUTOPHILPCT 58.3 03/31/2020    LYMPHOPCT 25.4 03/31/2020    MONOPCT 12.0 03/31/2020    EOSRELPCT 3.5 03/31/2020    BASOPCT 0.8 03/31/2020    NEUTROABS 2.5 03/31/2020    LYMPHSABS 1.1 03/31/2020    MONOSABS 0.5 03/31/2020    EOSABS 0.1 03/31/2020    BASOSABS 0.0 03/31/2020         No results found for this visit on 05/28/20. Assessment/Plan     1. Essential hypertension  - stable  - Continue amLODIPine (NORVASC) 2.5 MG tablet; Take 1 tablet by mouth daily  Dispense: 90 tablet; Refill: 1  - Continue lisinopril (PRINIVIL;ZESTRIL) 40 MG tablet; Take 1 tablet by mouth daily  Dispense: 90 tablet; Refill: 1  -Low sodium diet  -Regular aerobic exercise    2. Postsurgical hypothyroidism  -stable  -Continue same medications    3. Mixed hyperlipidemia  -Continue same medications  -Low fat, low cholesterol diet  -Regular aerobic exercise    4.  Routine general medical

## 2020-05-28 NOTE — PATIENT INSTRUCTIONS
-Continue same medications  -Start Atrovent nasal spray as needed  -Low fat, low cholesterol diet  -Low sodium diet  -Regular aerobic exercise

## 2020-05-29 ASSESSMENT — ENCOUNTER SYMPTOMS
EYE DISCHARGE: 0
EYE PAIN: 0
CHOKING: 0
SINUS PAIN: 0
PHOTOPHOBIA: 0
VOICE CHANGE: 0
APNEA: 0
EYE ITCHING: 0
BACK PAIN: 1
EYE REDNESS: 0
RECTAL PAIN: 0
ABDOMINAL DISTENTION: 0
FACIAL SWELLING: 0
ANAL BLEEDING: 0

## 2020-06-15 RX ORDER — AMLODIPINE BESYLATE 2.5 MG/1
2.5 TABLET ORAL DAILY
Qty: 90 TABLET | Refills: 1 | Status: SHIPPED | OUTPATIENT
Start: 2020-06-15 | End: 2020-06-23

## 2020-06-19 ENCOUNTER — TELEPHONE (OUTPATIENT)
Dept: PRIMARY CARE CLINIC | Age: 85
End: 2020-06-19

## 2020-06-22 ENCOUNTER — VIRTUAL VISIT (OUTPATIENT)
Dept: PRIMARY CARE CLINIC | Age: 85
End: 2020-06-22
Payer: MEDICARE

## 2020-06-22 PROCEDURE — 1123F ACP DISCUSS/DSCN MKR DOCD: CPT | Performed by: INTERNAL MEDICINE

## 2020-06-22 PROCEDURE — 1090F PRES/ABSN URINE INCON ASSESS: CPT | Performed by: INTERNAL MEDICINE

## 2020-06-22 PROCEDURE — 4040F PNEUMOC VAC/ADMIN/RCVD: CPT | Performed by: INTERNAL MEDICINE

## 2020-06-22 PROCEDURE — G8400 PT W/DXA NO RESULTS DOC: HCPCS | Performed by: INTERNAL MEDICINE

## 2020-06-22 PROCEDURE — 99213 OFFICE O/P EST LOW 20 MIN: CPT | Performed by: INTERNAL MEDICINE

## 2020-06-22 PROCEDURE — G8427 DOCREV CUR MEDS BY ELIG CLIN: HCPCS | Performed by: INTERNAL MEDICINE

## 2020-06-22 RX ORDER — LEVOTHYROXINE SODIUM 0.07 MG/1
75 TABLET ORAL DAILY
Qty: 90 TABLET | Refills: 1 | Status: SHIPPED | OUTPATIENT
Start: 2020-06-22 | End: 2021-06-04 | Stop reason: SDUPTHER

## 2020-06-22 ASSESSMENT — ENCOUNTER SYMPTOMS
DIARRHEA: 0
SORE THROAT: 0
CONSTIPATION: 0
TROUBLE SWALLOWING: 0

## 2020-06-22 ASSESSMENT — PATIENT HEALTH QUESTIONNAIRE - PHQ9
SUM OF ALL RESPONSES TO PHQ9 QUESTIONS 1 & 2: 0
1. LITTLE INTEREST OR PLEASURE IN DOING THINGS: 0
SUM OF ALL RESPONSES TO PHQ QUESTIONS 1-9: 0
2. FEELING DOWN, DEPRESSED OR HOPELESS: 0
SUM OF ALL RESPONSES TO PHQ QUESTIONS 1-9: 0

## 2020-06-22 NOTE — PROGRESS NOTES
2020    TELEHEALTH EVALUATION -- Audio/Visual (During BPOPB-76 public health emergency)    Chief Complaint   Patient presents with    Hypertension    Hypothyroidism       HPI:    Lew Aguilars (:  1935) has requested an audio/video evaluation for the following concern(s):    Pt didn't go to assisted living as planned. Pt is still living with her son and daughter- Poli Gilliam. Pt has hypothyroidism. Pt states she needs a new Rx for Synthroid and it needs to be for the brand name. Pt denies weight gain, constipation, and fatigue. Pt has cold intolerance. Pt c/o lips tingle, tongue feels like it swells initially when she takes medication for years but bothers her more recently. Pt states she was not sure if it was due to Primidone or Lisinopril. Pt states she stopped Primidone and still has symptoms. Pt takes Lisinopril for Hypertension. Pt decreases salt. Review of Systems   Constitutional: Positive for unexpected weight change (weight loss). Negative for fatigue. HENT: Negative for sore throat and trouble swallowing. Eyes: Negative for visual disturbance. Respiratory: Positive for chest tightness (pt will follow up with Cardiology) and shortness of breath (little). Cardiovascular: Negative for chest pain, palpitations and leg swelling. Gastrointestinal: Negative for constipation and diarrhea. Endocrine: Negative for cold intolerance and heat intolerance. Genitourinary: Negative for frequency and hematuria. Neurological: Negative for dizziness, syncope, light-headedness and headaches. Psychiatric/Behavioral: Negative for dysphoric mood. Prior to Visit Medications    Medication Sig Taking?  Authorizing Provider   amLODIPine (NORVASC) 2.5 MG tablet TAKE 1 TABLET BY MOUTH DAILY Yes Dana Mc MD   lisinopril (PRINIVIL;ZESTRIL) 40 MG tablet Take 1 tablet by mouth daily Yes Dana Mc MD   ipratropium (ATROVENT) 0.03 % nasal spray 2 sprays by Nasal route 2

## 2020-06-23 ENCOUNTER — OFFICE VISIT (OUTPATIENT)
Dept: CARDIOLOGY CLINIC | Age: 85
End: 2020-06-23
Payer: MEDICARE

## 2020-06-23 VITALS
DIASTOLIC BLOOD PRESSURE: 80 MMHG | BODY MASS INDEX: 21.44 KG/M2 | HEIGHT: 63 IN | HEART RATE: 52 BPM | OXYGEN SATURATION: 97 % | WEIGHT: 121 LBS | SYSTOLIC BLOOD PRESSURE: 142 MMHG | TEMPERATURE: 98.3 F

## 2020-06-23 PROCEDURE — 99214 OFFICE O/P EST MOD 30 MIN: CPT | Performed by: INTERNAL MEDICINE

## 2020-06-23 PROCEDURE — 1090F PRES/ABSN URINE INCON ASSESS: CPT | Performed by: INTERNAL MEDICINE

## 2020-06-23 PROCEDURE — 93000 ELECTROCARDIOGRAM COMPLETE: CPT | Performed by: INTERNAL MEDICINE

## 2020-06-23 PROCEDURE — G8420 CALC BMI NORM PARAMETERS: HCPCS | Performed by: INTERNAL MEDICINE

## 2020-06-23 PROCEDURE — G8400 PT W/DXA NO RESULTS DOC: HCPCS | Performed by: INTERNAL MEDICINE

## 2020-06-23 PROCEDURE — 1123F ACP DISCUSS/DSCN MKR DOCD: CPT | Performed by: INTERNAL MEDICINE

## 2020-06-23 PROCEDURE — G8427 DOCREV CUR MEDS BY ELIG CLIN: HCPCS | Performed by: INTERNAL MEDICINE

## 2020-06-23 PROCEDURE — 1036F TOBACCO NON-USER: CPT | Performed by: INTERNAL MEDICINE

## 2020-06-23 PROCEDURE — 4040F PNEUMOC VAC/ADMIN/RCVD: CPT | Performed by: INTERNAL MEDICINE

## 2020-06-23 RX ORDER — AMLODIPINE BESYLATE 5 MG/1
5 TABLET ORAL DAILY
Qty: 90 TABLET | Refills: 1 | Status: SHIPPED | OUTPATIENT
Start: 2020-06-23 | End: 2020-11-30

## 2020-06-23 ASSESSMENT — ENCOUNTER SYMPTOMS
ABDOMINAL PAIN: 0
STRIDOR: 0
BLOOD IN STOOL: 0
WHEEZING: 0
COLOR CHANGE: 0
CHEST TIGHTNESS: 0
EYE PAIN: 0
TROUBLE SWALLOWING: 0
CONSTIPATION: 0
BACK PAIN: 0
NAUSEA: 0
VOMITING: 0
DIARRHEA: 0
ABDOMINAL DISTENTION: 0
APNEA: 0
PHOTOPHOBIA: 0

## 2020-06-23 NOTE — PROGRESS NOTES
performed by Krish Triplett MD at [address] TOTAL KNEE ARTHROPLASTY          Social HIstory  Social History     Tobacco Use    Smoking status: Former Smoker    Smokeless tobacco: Never Used   Substance Use Topics    Alcohol use: Yes     Comment: rare    Drug use: Never       Family History  Family History   Problem Relation Age of Onset    Heart Disease Mother     Cancer Father        Allergies   No Known Allergies    Medications:     Home Medications:  Were reviewed and are listed in nursing record. and/or listed below    Prior to Admission medications    Medication Sig Start Date End Date Taking? Authorizing Provider   amLODIPine (NORVASC) 5 MG tablet Take 1 tablet by mouth daily 6/23/20  Yes Kaitlin Askew MD   levothyroxine (SYNTHROID) 75 MCG tablet Take 1 tablet by mouth Daily 6/22/20  Yes Sudhakar Ramirez MD   aspirin (ASPIRIN 81) 81 MG EC tablet Take 1 tablet by mouth daily 5/28/20  Yes Sudhakar Ramirez MD   rosuvastatin (CRESTOR) 20 MG tablet Take 1 tablet by mouth daily 4/2/20  Yes Sulaiman Luis DO   nitroGLYCERIN (NITROSTAT) 0.4 MG SL tablet Place 0.4 mg under the tongue every 5 minutes as needed for Chest pain up to max of 3 total doses. If no relief after 1 dose, call 911. Yes Historical Provider, MD   isosorbide mononitrate (IMDUR) 120 MG extended release tablet Take 1.5 tablets by mouth daily 12/4/19  Yes Matheus Perry MD   vitamin D (CHOLECALCIFEROL) 1000 UNIT TABS tablet Take 1,000 Units by mouth daily   Yes Historical Provider, MD   primidone (MYSOLINE) 50 MG tablet Take 1 tablet by mouth nightly  Patient not taking: Reported on 6/22/2020 4/14/20   Sudhakar Ramirez MD        Review of Systems   Constitutional: Negative for activity change, appetite change, fatigue and unexpected weight change. HENT: Negative for hearing loss and trouble swallowing. Eyes: Negative for photophobia, pain and visual disturbance.    Respiratory: Negative for apnea, chest tightness, wheezing and stridor. Cardiovascular: Positive for chest pain. Negative for palpitations and leg swelling. Gastrointestinal: Negative for abdominal distention, abdominal pain, blood in stool, constipation, diarrhea, nausea and vomiting. Genitourinary: Negative for flank pain and pelvic pain. Musculoskeletal: Negative for back pain and neck pain. Skin: Negative for color change and pallor. Neurological: Negative for dizziness, syncope, facial asymmetry, speech difficulty, weakness, light-headedness, numbness and headaches. Psychiatric/Behavioral: Negative for agitation, behavioral problems and confusion. Vitals:    06/23/20 1610   BP: (!) 142/80   Pulse: 52   Temp:    SpO2:     Weight: 121 lb (54.9 kg)       Vitals:    06/23/20 1604 06/23/20 1610   BP: (!) 150/80 (!) 142/80   Site: Left Upper Arm    Position: Sitting    Cuff Size: Medium Adult    Pulse: 53 52   Temp: 98.3 °F (36.8 °C)    SpO2: 97%    Weight: 121 lb (54.9 kg)    Height: 5' 3\" (1.6 m)        BP Readings from Last 3 Encounters:   06/23/20 (!) 142/80   05/28/20 138/80   04/01/20 132/78       Wt Readings from Last 3 Encounters:   06/23/20 121 lb (54.9 kg)   05/28/20 117 lb (53.1 kg)   03/30/20 119 lb 6.4 oz (54.2 kg)       Physical Exam  Constitutional:       General: She is not in acute distress. Appearance: She is well-developed. She is not diaphoretic. HENT:      Head: Normocephalic and atraumatic. Nose: Nose normal.      Mouth/Throat:      Pharynx: No oropharyngeal exudate. Eyes:      General:         Right eye: No discharge. Left eye: No discharge. Conjunctiva/sclera: Conjunctivae normal.      Pupils: Pupils are equal, round, and reactive to light. Neck:      Musculoskeletal: Normal range of motion and neck supple. Vascular: No JVD. Trachea: No tracheal deviation. Cardiovascular:      Rate and Rhythm: Normal rate and regular rhythm. Heart sounds: Normal heart sounds.

## 2020-06-28 ASSESSMENT — ENCOUNTER SYMPTOMS
SHORTNESS OF BREATH: 1
CHEST TIGHTNESS: 1

## 2020-06-28 NOTE — PATIENT INSTRUCTIONS
1. Postsurgical hypothyroidism  - levothyroxine (SYNTHROID) 75 MCG tablet; Take 1 tablet by mouth Daily  Dispense: 90 tablet; Refill: 1 CHARLEE    2.  Essential hypertension  -Discontinue Lisinopril due to side effects  -Increase Amlodipine to 5mg once daily  -Low sodium diet

## 2020-06-29 ENCOUNTER — TELEPHONE (OUTPATIENT)
Dept: PRIMARY CARE CLINIC | Age: 85
End: 2020-06-29

## 2020-06-29 NOTE — TELEPHONE ENCOUNTER
Call [facility] and confirm that patient's Synthroid will be dispensed as brand name. Pt needs Synthroid 75mcg po q day brand name. I sent a Rx on 6/22/20.

## 2020-07-06 NOTE — TELEPHONE ENCOUNTER
Medication:   Requested Prescriptions     Pending Prescriptions Disp Refills    rosuvastatin (CRESTOR) 20 MG tablet 90 tablet 1     Sig: Take 1 tablet by mouth daily       Last Filled:      Patient Phone Number: [phone] (home)     Last appt: 5/28/2020 06-22-20  Next appt: Visit date not found    Last Lipid:   Lab Results   Component Value Date    CHOL 180 02/27/2020    TRIG 49 02/27/2020    HDL 98 02/27/2020    [address] 02/27/2020       Last OARRS: No flowsheet data found.     Preferred Pharmacy:   [address], [id number] [place] [address] - P [phone] - F [phone]  [address] [place] [id number]  Phone: [phone] Fax: [phone]  [facility] [address], [address] [place] [address] [phone]  F [phone]  [place] [address]  Phone: [phone] Fax: [phone]

## 2020-07-07 ENCOUNTER — NURSE TRIAGE (OUTPATIENT)
Dept: OTHER | Facility: CLINIC | Age: 85
End: 2020-07-07

## 2020-07-07 ENCOUNTER — TELEPHONE (OUTPATIENT)
Dept: PRIMARY CARE CLINIC | Age: 85
End: 2020-07-07

## 2020-07-07 RX ORDER — ROSUVASTATIN CALCIUM 20 MG/1
20 TABLET, COATED ORAL DAILY
Qty: 90 TABLET | Refills: 1 | Status: SHIPPED | OUTPATIENT
Start: 2020-07-07 | End: 2021-05-06

## 2020-07-07 NOTE — TELEPHONE ENCOUNTER
Reason for Disposition   Patient wants to be seen    Answer Assessment - Initial Assessment Questions  1. DESCRIPTION: \"Describe your dizziness. \"      *No Answer*  2. LIGHTHEADED: \"Do you feel lightheaded? \" (e.g., somewhat faint, woozy, weak upon standing)      More dizzt when standing  3. VERTIGO: \"Do you feel like either you or the room is spinning or tilting? \" (i.e. vertigo)      *No Answer*  4. SEVERITY: \"How bad is it? \"  \"Do you feel like you are going to faint? \" \"Can you stand and walk? \"    - MILD - walking normally    - MODERATE - interferes with normal activities (e.g., work, school)     - SEVERE - unable to stand, requires support to walk, feels like passing out now. *No Answer*  5. ONSET:  \"When did the dizziness begin? \"      1 weeks   6. AGGRAVATING FACTORS: \"Does anything make it worse? \" (e.g., standing, change in head position)      *No Answer*  7. HEART RATE: \"Can you tell me your heart rate? \" \"How many beats in 15 seconds? \"  (Note: not all patients can do this)        *No Answer*  8. CAUSE: \"What do you think is causing the dizziness?\"        9. RECURRENT SYMPTOM: \"Have you had dizziness before? \" If so, ask: \"When was the last time? \" \"What happened that time? \"      *No Answer*  10. OTHER SYMPTOMS: \"Do you have any other symptoms? \" (e.g., fever, chest pain, vomiting, diarrhea, bleeding)        Nausea,   11. PREGNANCY: \"Is there any chance you are pregnant? \" \"When was your last menstrual period? \"        *No Answer*    Protocols used: [id number]

## 2020-07-07 NOTE — TELEPHONE ENCOUNTER
Pt has bharath told by Nurse  Triage  to call for an appt with Dr. Oscar Rubio, unfortunately, the schedule is full this week and next week, Pt does not matter to do either VV or OV but please in the early morning. Her Daughter in law will be there to help and her name is Gay Kaplan. please advice what to do?

## 2020-07-09 ENCOUNTER — OFFICE VISIT (OUTPATIENT)
Dept: PRIMARY CARE CLINIC | Age: 85
End: 2020-07-09
Payer: MEDICARE

## 2020-07-09 VITALS
HEART RATE: 70 BPM | TEMPERATURE: 98.4 F | OXYGEN SATURATION: 98 % | SYSTOLIC BLOOD PRESSURE: 134 MMHG | DIASTOLIC BLOOD PRESSURE: 80 MMHG | BODY MASS INDEX: 21.61 KG/M2 | WEIGHT: 122 LBS | RESPIRATION RATE: 16 BRPM

## 2020-07-09 PROCEDURE — G8420 CALC BMI NORM PARAMETERS: HCPCS | Performed by: INTERNAL MEDICINE

## 2020-07-09 PROCEDURE — 1090F PRES/ABSN URINE INCON ASSESS: CPT | Performed by: INTERNAL MEDICINE

## 2020-07-09 PROCEDURE — G8427 DOCREV CUR MEDS BY ELIG CLIN: HCPCS | Performed by: INTERNAL MEDICINE

## 2020-07-09 PROCEDURE — 1123F ACP DISCUSS/DSCN MKR DOCD: CPT | Performed by: INTERNAL MEDICINE

## 2020-07-09 PROCEDURE — 99214 OFFICE O/P EST MOD 30 MIN: CPT | Performed by: INTERNAL MEDICINE

## 2020-07-09 PROCEDURE — G8400 PT W/DXA NO RESULTS DOC: HCPCS | Performed by: INTERNAL MEDICINE

## 2020-07-09 PROCEDURE — 4040F PNEUMOC VAC/ADMIN/RCVD: CPT | Performed by: INTERNAL MEDICINE

## 2020-07-09 PROCEDURE — 1036F TOBACCO NON-USER: CPT | Performed by: INTERNAL MEDICINE

## 2020-07-09 RX ORDER — DONEPEZIL HYDROCHLORIDE 5 MG/1
5 TABLET, FILM COATED ORAL NIGHTLY
Qty: 30 TABLET | Refills: 0 | Status: SHIPPED | OUTPATIENT
Start: 2020-07-09 | End: 2020-08-05 | Stop reason: SDUPTHER

## 2020-07-09 RX ORDER — POLYETHYLENE GLYCOL 3350 17 G/17G
17 POWDER, FOR SOLUTION ORAL DAILY
Qty: 510 G | Refills: 0 | Status: SHIPPED | OUTPATIENT
Start: 2020-07-09 | End: 2020-08-08

## 2020-07-09 RX ORDER — DONEPEZIL HYDROCHLORIDE 5 MG/1
TABLET, FILM COATED ORAL
Qty: 90 TABLET | OUTPATIENT
Start: 2020-07-09

## 2020-07-09 RX ORDER — DOCUSATE SODIUM 100 MG/1
100 CAPSULE, LIQUID FILLED ORAL 2 TIMES DAILY
Qty: 60 CAPSULE | Refills: 1 | Status: SHIPPED | OUTPATIENT
Start: 2020-07-09 | End: 2021-05-06

## 2020-07-09 NOTE — PATIENT INSTRUCTIONS
1. Constipation, unspecified constipation type  - docusate sodium (COLACE) 100 MG capsule; Take 1 capsule by mouth 2 times daily  Dispense: 60 capsule; Refill: 1  - polyethylene glycol (MIRALAX) 17 GM/SCOOP powder; Take 17 g by mouth daily  Dispense: 510 g; Refill: 0  -high fiber diet    2. Memory problem  - TSH without Reflex; Future  - Referral to [facility] - Brendan Caruso MD, Neurology, [place]  - donepezil (ARICEPT) 5 MG tablet; Take 1 tablet by mouth nightly  Dispense: 30 tablet; Refill: 0  - Vitamin B12; Future  - Comprehensive Metabolic Panel; Future  - CBC Auto Differential; Future    3. Postsurgical hypothyroidism  -Continue same medications  - TSH without Reflex;  Future

## 2020-07-09 NOTE — TELEPHONE ENCOUNTER
Rx refill for Donepezil 90 day supply denied with message to fill 30 day as prescribed because this is a new medication.

## 2020-07-09 NOTE — PROGRESS NOTES
Adrien Saldivar   Date ofBirth:  1935    Date of Visit:  7/9/2020    Chief Complaint   Patient presents with    Medication Adjustment    Constipation     Last 2 days, hard stools.  Memory Loss    Hypothyroidism       HPI  Pt states she his having problems and is attributing all of her problems to medication. Pt wants to see what can be eliminated. Pt c/o constipation. Pt states she never had a problem with constipation until now. Pt states the last 3 days her stool is hard like rubber and she has nausea. Pt states usually if she eats she has a BM within 30 minutes. Pt states she started drinking water and psychologically has a bad feeling and that she doesn't feel well and feels better before drinking water. Pt c/o her memory is worse. Pt states she puts things in a certain place and when she goes back to find them they are not there. Pt states she has a camera in the area she moves around more often so she can see what she is doing. Pt has a [employer] home security camera. Pt states if she has left something and it is not there she can look at the camera to find out what she did. Pt has her son look at the camera to help her find something if she misplaced it. Pt has hypothyroidism. Pt states she did get the brand name Synthroid. Pt thinks the generic Synthroid was causing her problems. Pt is always cold. Pt has had weight loss. Pt denies fatigue and weight gain. Wt Readings from Last 3 Encounters:   07/09/20 122 lb (55.3 kg)   06/23/20 121 lb (54.9 kg)   05/28/20 117 lb (53.1 kg)        Review of Systems   Constitutional: Positive for appetite change and unexpected weight change. Negative for chills, fatigue and fever. HENT: Negative for congestion, postnasal drip, rhinorrhea, sinus pressure, sore throat and trouble swallowing. Eyes: Positive for visual disturbance (pt has macular degeneration). Respiratory: Negative for cough, chest tightness, shortness of breath and wheezing. Lymphadenopathy:      Head:      Right side of head: No submandibular adenopathy. Left side of head: No submandibular adenopathy. Neurological:      Mental Status: She is alert and oriented to person, place, and time. Psychiatric:         Mood and Affect: Mood normal.           No results found for this visit on 07/09/20. Lab Review         Assessment/Plan     1. Constipation, unspecified constipation type  - docusate sodium (COLACE) 100 MG capsule; Take 1 capsule by mouth 2 times daily  Dispense: 60 capsule; Refill: 1  - polyethylene glycol (MIRALAX) 17 GM/SCOOP powder; Take 17 g by mouth daily  Dispense: 510 g; Refill: 0  -high fiber diet    2. Memory problem  - TSH without Reflex; Future  - Referral to [facility] - Bianca Mustafa MD, Neurology, [facility]  - donepezil (ARICEPT) 5 MG tablet; Take 1 tablet by mouth nightly  Dispense: 30 tablet; Refill: 0  - Vitamin B12; Future  - Comprehensive Metabolic Panel; Future  - CBC Auto Differential; Future    3. Postsurgical hypothyroidism  -Continue same medications  - TSH without Reflex; Future      Discussed medications with patient, who voiced understanding of their use and indications. All questions answered. Return in about 3 weeks (around 7/30/2020) for constipation, memory issues.

## 2020-07-14 DIAGNOSIS — R41.3 MEMORY PROBLEM: ICD-10-CM

## 2020-07-14 DIAGNOSIS — E89.0 POSTSURGICAL HYPOTHYROIDISM: ICD-10-CM

## 2020-07-14 LAB
A/G RATIO: 2.3 (ref 1.1–2.2)
ALBUMIN SERPL-MCNC: 4.5 G/DL (ref 3.4–5)
ALP BLD-CCNC: 61 U/L (ref 40–129)
ALT SERPL-CCNC: 8 U/L (ref 10–40)
ANION GAP SERPL CALCULATED.3IONS-SCNC: 14 MMOL/L (ref 3–16)
AST SERPL-CCNC: 20 U/L (ref 15–37)
BASOPHILS ABSOLUTE: 0.1 K/UL (ref 0–0.2)
BASOPHILS RELATIVE PERCENT: 0.9 %
BILIRUB SERPL-MCNC: 0.5 MG/DL (ref 0–1)
BUN BLDV-MCNC: 10 MG/DL (ref 7–20)
CALCIUM SERPL-MCNC: 9.8 MG/DL (ref 8.3–10.6)
CHLORIDE BLD-SCNC: 106 MMOL/L (ref 99–110)
CO2: 26 MMOL/L (ref 21–32)
CREAT SERPL-MCNC: 0.6 MG/DL (ref 0.6–1.2)
EOSINOPHILS ABSOLUTE: 0.1 K/UL (ref 0–0.6)
EOSINOPHILS RELATIVE PERCENT: 1.2 %
GFR AFRICAN AMERICAN: >60
GFR NON-AFRICAN AMERICAN: >60
GLOBULIN: 2 G/DL
GLUCOSE BLD-MCNC: 86 MG/DL (ref 70–99)
HCT VFR BLD CALC: 43.2 % (ref 36–48)
HEMOGLOBIN: 13.9 G/DL (ref 12–16)
LYMPHOCYTES ABSOLUTE: 1.1 K/UL (ref 1–5.1)
LYMPHOCYTES RELATIVE PERCENT: 18.3 %
MCH RBC QN AUTO: 29.2 PG (ref 26–34)
MCHC RBC AUTO-ENTMCNC: 32.1 G/DL (ref 31–36)
MCV RBC AUTO: 90.8 FL (ref 80–100)
MONOCYTES ABSOLUTE: 0.5 K/UL (ref 0–1.3)
MONOCYTES RELATIVE PERCENT: 8.4 %
NEUTROPHILS ABSOLUTE: 4.2 K/UL (ref 1.7–7.7)
NEUTROPHILS RELATIVE PERCENT: 71.2 %
PDW BLD-RTO: 15.6 % (ref 12.4–15.4)
PLATELET # BLD: 218 K/UL (ref 135–450)
PMV BLD AUTO: 9 FL (ref 5–10.5)
POTASSIUM SERPL-SCNC: 4.4 MMOL/L (ref 3.5–5.1)
RBC # BLD: 4.75 M/UL (ref 4–5.2)
SODIUM BLD-SCNC: 146 MMOL/L (ref 136–145)
TOTAL PROTEIN: 6.5 G/DL (ref 6.4–8.2)
TSH SERPL DL<=0.05 MIU/L-ACNC: 1.01 UIU/ML (ref 0.27–4.2)
VITAMIN B-12: 884 PG/ML (ref 211–911)
WBC # BLD: 5.9 K/UL (ref 4–11)

## 2020-07-16 ASSESSMENT — ENCOUNTER SYMPTOMS
VOMITING: 0
DIARRHEA: 0
CHEST TIGHTNESS: 0
COUGH: 0
SINUS PRESSURE: 0
NAUSEA: 1
RHINORRHEA: 0
WHEEZING: 0
SORE THROAT: 0
CONSTIPATION: 1
TROUBLE SWALLOWING: 0
BLOOD IN STOOL: 0
ABDOMINAL PAIN: 0
SHORTNESS OF BREATH: 0

## 2020-08-04 ENCOUNTER — TELEPHONE (OUTPATIENT)
Dept: PRIMARY CARE CLINIC | Age: 85
End: 2020-08-04

## 2020-08-04 NOTE — TELEPHONE ENCOUNTER
----- Message from Neel Gray sent at 8/4/2020  8:45 AM EDT -----  Subject: Appointment Request    Reason for Call: Urgent (Patient Request) Existing Condition Follow    QUESTIONS  Type of Appointment? Established Patient  Reason for appointment request? No appointments available during search  Additional Information for Provider? Patient garth called in to schedule   appt as pt still can not go to the bathroom. Has not had bowel movement in   a few days ( sunday ). Please advise.   ---------------------------------------------------------------------------  --------------  CALL BACK INFO  What is the best way for the office to contact you? OK to leave message on   voicemail  Preferred Call Back Phone Number? [phone]  ---------------------------------------------------------------------------  --------------  SCRIPT ANSWERS  Relationship to Patient? Other  Representative Name? shelby  Additional information verified (besides Name and Date of Birth)? Address  Appointment reason? Well Care/Follow Ups  Select a Well Care/Follow Ups appointment reason? Adult Existing Condition   Follow Up [Diabetes   CHF   COPD   Hypertension/Blood Pressure Check]  (Is the patient requesting to be seen urgently for their symptoms?)? Yes  Is this follow up request related to routine Diabetes Management? No  Are you having any new concerns about your existing condition? No  Have you been diagnosed with   tested for   or told that you are suspected of having COVID-19 (Coronavirus)? No  Have you had a fever or taken medication to treat a fever within the past   3 days? No  Have you had a cough   shortness of breath or flu-like symptoms within the past 3 days? No  Do you currently have flu-like symptoms including fever or chills   cough   shortness of breath   or difficulty breathing   or new loss of taste or smell? No  (Service Expert  click yes below to proceed with [employer] As Usual   Scheduling)?  Yes

## 2020-08-04 NOTE — TELEPHONE ENCOUNTER
Medication:   Requested Prescriptions     Pending Prescriptions Disp Refills    buPROPion (WELLBUTRIN XL) 300 MG extended release tablet [Pharmacy Med Name: BUPROPION XL 300MG TABLETS] 30 tablet 1     Sig: TAKE 1 TABLET BY MOUTH EVERY MORNING     Last Filled:  5.8.20  Last appt: 7/9/2020   Next appt: Visit date not found    Last OARRS: No flowsheet data found.

## 2020-08-05 ENCOUNTER — OFFICE VISIT (OUTPATIENT)
Dept: PRIMARY CARE CLINIC | Age: 85
End: 2020-08-05
Payer: MEDICARE

## 2020-08-05 VITALS
OXYGEN SATURATION: 98 % | SYSTOLIC BLOOD PRESSURE: 118 MMHG | HEART RATE: 70 BPM | TEMPERATURE: 97.3 F | DIASTOLIC BLOOD PRESSURE: 76 MMHG | WEIGHT: 122 LBS | BODY MASS INDEX: 21.61 KG/M2

## 2020-08-05 LAB
BILIRUBIN, POC: NORMAL
BLOOD URINE, POC: NORMAL
CLARITY, POC: CLEAR
COLOR, POC: YELLOW
GLUCOSE URINE, POC: NORMAL
KETONES, POC: NORMAL
LEUKOCYTE EST, POC: NORMAL
NITRITE, POC: NORMAL
PH, POC: 6.5
PROTEIN, POC: NORMAL
SPECIFIC GRAVITY, POC: 1.03
UROBILINOGEN, POC: 0.2

## 2020-08-05 PROCEDURE — 1090F PRES/ABSN URINE INCON ASSESS: CPT | Performed by: INTERNAL MEDICINE

## 2020-08-05 PROCEDURE — 81002 URINALYSIS NONAUTO W/O SCOPE: CPT | Performed by: INTERNAL MEDICINE

## 2020-08-05 PROCEDURE — G8427 DOCREV CUR MEDS BY ELIG CLIN: HCPCS | Performed by: INTERNAL MEDICINE

## 2020-08-05 PROCEDURE — G8400 PT W/DXA NO RESULTS DOC: HCPCS | Performed by: INTERNAL MEDICINE

## 2020-08-05 PROCEDURE — 99214 OFFICE O/P EST MOD 30 MIN: CPT | Performed by: INTERNAL MEDICINE

## 2020-08-05 PROCEDURE — G8420 CALC BMI NORM PARAMETERS: HCPCS | Performed by: INTERNAL MEDICINE

## 2020-08-05 PROCEDURE — 4040F PNEUMOC VAC/ADMIN/RCVD: CPT | Performed by: INTERNAL MEDICINE

## 2020-08-05 PROCEDURE — 1123F ACP DISCUSS/DSCN MKR DOCD: CPT | Performed by: INTERNAL MEDICINE

## 2020-08-05 PROCEDURE — 1036F TOBACCO NON-USER: CPT | Performed by: INTERNAL MEDICINE

## 2020-08-05 RX ORDER — M-VIT,TX,IRON,MINS/CALC/FOLIC 27MG-0.4MG
1 TABLET ORAL DAILY
COMMUNITY

## 2020-08-05 RX ORDER — DONEPEZIL HYDROCHLORIDE 5 MG/1
5 TABLET, FILM COATED ORAL NIGHTLY
Qty: 90 TABLET | Refills: 0 | Status: SHIPPED | OUTPATIENT
Start: 2020-08-05 | End: 2020-09-25 | Stop reason: SDUPTHER

## 2020-08-05 RX ORDER — BUPROPION HYDROCHLORIDE 300 MG/1
300 TABLET ORAL EVERY MORNING
Qty: 90 TABLET | Refills: 0 | Status: SHIPPED | OUTPATIENT
Start: 2020-08-05 | End: 2021-05-06

## 2020-08-05 RX ORDER — LIDOCAINE 50 MG/G
PATCH TOPICAL
Qty: 90 PATCH | Refills: 0 | Status: SHIPPED | OUTPATIENT
Start: 2020-08-05 | End: 2021-05-06 | Stop reason: ALTCHOICE

## 2020-08-05 RX ORDER — ONDANSETRON 4 MG/1
4 TABLET, FILM COATED ORAL 3 TIMES DAILY PRN
Qty: 15 TABLET | Refills: 0 | Status: SHIPPED | OUTPATIENT
Start: 2020-08-05 | End: 2021-05-06 | Stop reason: ALTCHOICE

## 2020-08-05 ASSESSMENT — ENCOUNTER SYMPTOMS
CONSTIPATION: 1
VOMITING: 0
ABDOMINAL DISTENTION: 0
SINUS PRESSURE: 0
COUGH: 0
SHORTNESS OF BREATH: 0
SORE THROAT: 0
BLOOD IN STOOL: 0
RHINORRHEA: 0
WHEEZING: 0
ABDOMINAL PAIN: 1
CHEST TIGHTNESS: 0
BACK PAIN: 0
DIARRHEA: 0
NAUSEA: 1
TROUBLE SWALLOWING: 0

## 2020-08-05 NOTE — PATIENT INSTRUCTIONS
1. Constipation, unspecified constipation type  - stop Miralax  -Colace 100mg 2 times daily  -Metamucil 1 tablespoon daily  -high fiber diet    2. Nausea  - CT ABDOMEN PELVIS W IV CONTRAST Additional Contrast? Radiologist Recommendation; Future  - POCT Urinalysis no Micro  - ondansetron (ZOFRAN) 4 MG tablet; Take 1 tablet by mouth 3 times daily as needed for Nausea or Vomiting  Dispense: 15 tablet; Refill: 0    3. Dysuria  - POCT Urinalysis no Micro    4. Abdominal pain, unspecified abdominal location  - CT ABDOMEN PELVIS W IV CONTRAST Additional Contrast? Radiologist Recommendation; Future  - POCT Urinalysis no Micro    5. Major depressive disorder, remission status unspecified, unspecified whether recurrent  - buPROPion (WELLBUTRIN XL) 300 MG extended release tablet; Take 1 tablet by mouth every morning  Dispense: 90 tablet; Refill: 0    6. Memory problem  - donepezil (ARICEPT) 5 MG tablet; Take 1 tablet by mouth nightly  Dispense: 90 tablet; Refill: 0    7. Neck pain  - lidocaine (LIDODERM) 5 %; APPLY 1 PATCH TO SKIN ONCE DAILY FOR UP TO 12 HOURS ON AND THEN 12 HOURS OFF  Dispense: 90 patch; Refill: 0  - Tylenol 650mg 3 times daily as needed        Patient Education        Constipation: Care Instructions  Your Care Instructions     Constipation means that you have a hard time passing stools (bowel movements). People pass stools from 3 times a day to once every 3 days. What is normal for you may be different. Constipation may occur with pain in the rectum and cramping. The pain may get worse when you try to pass stools. Sometimes there are small amounts of bright red blood on toilet paper or the surface of stools. This is because of enlarged veins near the rectum (hemorrhoids). A few changes in your diet and lifestyle may help you avoid ongoing constipation. Your doctor may also prescribe medicine to help loosen your stool. Some medicines can cause constipation.  These include pain medicines and antidepressants. Tell your doctor about all the medicines you take. Your doctor may want to make a medicine change to ease your symptoms. Follow-up care is a key part of your treatment and safety. Be sure to make and go to all appointments, and call your doctor if you are having problems. It's also a good idea to know your test results and keep a list of the medicines you take. How can you care for yourself at home? · Drink plenty of fluids, enough so that your urine is light yellow or clear like water. If you have kidney, heart, or liver disease and have to limit fluids, talk with your doctor before you increase the amount of fluids you drink. · Include high-fiber foods in your diet each day. These include fruits, vegetables, beans, and whole grains. · Get at least 30 minutes of exercise on most days of the week. Walking is a good choice. You also may want to do other activities, such as running, swimming, cycling, or playing tennis or team sports. · Take a fiber supplement, such as Citrucel or Metamucil, every day. Read and follow all instructions on the label. · Schedule time each day for a bowel movement. A daily routine may help. Take your time having your bowel movement. · Support your feet with a small step stool when you sit on the toilet. This helps flex your hips and places your pelvis in a squatting position. · Your doctor may recommend an over-the-counter laxative to relieve your constipation. Examples are Milk of Magnesia and MiraLax. Read and follow all instructions on the label. Do not use laxatives on a long-term basis. When should you call for help? Call your doctor now or seek immediate medical care if:  · You have new or worse belly pain. · You have new or worse nausea or vomiting. · You have blood in your stools. Watch closely for changes in your health, and be sure to contact your doctor if:  · Your constipation is getting worse. · You do not get better as expected.   Where can you learn more? Go to [email or web address] and sign in to your [employer] account. Enter 21 [phone] in the [facility] box to learn more about \"Constipation: Care Instructions. \"     If you do not have an account, please click on the \"Sign Up Now\" link. Current as of: June 26, 2019               Content Version: 12.5  © 2254-5530 Healthwise, Incorporated. Care instructions adapted under license by [facility] ([facility]). If you have questions about a medical condition or this instruction, always ask your healthcare professional. [address] any warranty or liability for your use of this information.

## 2020-08-05 NOTE — PROGRESS NOTES
tablet 0    amLODIPine (NORVASC) 5 MG tablet Take 1 tablet by mouth daily (Patient taking differently: Take 5 mg by mouth daily Takes if BP is elevated) 90 tablet 1    levothyroxine (SYNTHROID) 75 MCG tablet Take 1 tablet by mouth Daily 90 tablet 1    aspirin (ASPIRIN 81) 81 MG EC tablet Take 1 tablet by mouth daily      nitroGLYCERIN (NITROSTAT) 0.4 MG SL tablet Place 0.4 mg under the tongue every 5 minutes as needed for Chest pain up to max of 3 total doses. If no relief after 1 dose, call 911.  isosorbide mononitrate (IMDUR) 120 MG extended release tablet Take 1.5 tablets by mouth daily 90 tablet 3    vitamin D (CHOLECALCIFEROL) 1000 UNIT TABS tablet Take 1,000 Units by mouth daily           Vitals:    08/05/20 0914   BP: 118/76   Pulse: 70   Temp: 97.3 °F (36.3 °C)   SpO2: 98%   Weight: 122 lb (55.3 kg)     Body mass index is 21.61 kg/m². Physical Exam  Nursing note reviewed. Constitutional:       General: She is not in acute distress. Appearance: Normal appearance. She is well-developed. HENT:      Head: Normocephalic and atraumatic. Right Ear: Hearing, tympanic membrane and ear canal normal.      Left Ear: Hearing, tympanic membrane and ear canal normal.      Nose: Nose normal.      Mouth/Throat:      Pharynx: Oropharynx is clear. Uvula midline. Eyes:      General: Lids are normal.      Extraocular Movements: Extraocular movements intact. Conjunctiva/sclera: Conjunctivae normal.      Pupils: Pupils are equal, round, and reactive to light. Neck:      Musculoskeletal: Neck supple. Thyroid: No thyromegaly. Vascular: No carotid bruit. Cardiovascular:      Rate and Rhythm: Normal rate and regular rhythm. Heart sounds: Normal heart sounds, S1 normal and S2 normal. No murmur. No friction rub. No gallop. Pulmonary:      Effort: Pulmonary effort is normal. No respiratory distress. Breath sounds: Normal breath sounds. No wheezing, rhonchi or rales.    Chest: Breasts: Breasts are symmetrical.         Right: No inverted nipple, mass, nipple discharge, skin change or tenderness. Left: No inverted nipple, mass, nipple discharge, skin change or tenderness. Abdominal:      General: Bowel sounds are normal. There is no distension. Palpations: Abdomen is soft. There is no mass. Tenderness: There is generalized abdominal tenderness. There is no guarding or rebound. Musculoskeletal:      Right shoulder: She exhibits normal range of motion and no tenderness. Left shoulder: She exhibits normal range of motion and no tenderness. Right elbow: She exhibits no swelling. No tenderness found. Left elbow: She exhibits no swelling. No tenderness found. Right wrist: She exhibits no tenderness and no swelling. Left wrist: She exhibits no tenderness and no swelling. Right hip: She exhibits no tenderness. Left hip: She exhibits no tenderness. Right knee: She exhibits no swelling. No tenderness found. Left knee: She exhibits no swelling. No tenderness found. Right ankle: She exhibits no swelling. No tenderness. Left ankle: She exhibits no swelling. No tenderness. Cervical back: She exhibits decreased range of motion and tenderness. She exhibits no spasm. Thoracic back: She exhibits no tenderness and no spasm. Lumbar back: She exhibits normal range of motion, no tenderness and no spasm. Right upper arm: She exhibits no tenderness. Left upper arm: She exhibits no tenderness. Right hand: She exhibits no tenderness and no swelling. Left hand: She exhibits no tenderness and no swelling. Right upper leg: She exhibits no tenderness. Left upper leg: She exhibits no tenderness. Right lower leg: She exhibits no tenderness. No edema. Left lower leg: She exhibits no tenderness. No edema. Right foot: No tenderness or swelling. Left foot: No tenderness or swelling. Lymphadenopathy:      Head:      Right side of head: No submandibular adenopathy. Left side of head: No submandibular adenopathy. Cervical: No cervical adenopathy. Skin:     General: Skin is warm and dry. Findings: No bruising, erythema, lesion or rash. Neurological:      Mental Status: She is alert and oriented to person, place, and time. Cranial Nerves: No cranial nerve deficit. Gait: Gait normal.      Deep Tendon Reflexes: Reflexes are normal and symmetric. Babinski sign absent on the right side. Babinski sign absent on the left side.    Psychiatric:         Mood and Affect: Mood normal.         Speech: Speech normal.           Results for POC orders placed in visit on 08/05/20   POCT Urinalysis no Micro   Result Value Ref Range    Color, UA yellow     Clarity, UA clear     Glucose, UA POC neg     Bilirubin, UA neg     Ketones, UA neg     Spec Grav, UA 1.030     Blood, UA POC neg     pH, UA 6.5     Protein, UA POC neg     Urobilinogen, UA 0.2     Leukocytes, UA trace     Nitrite, UA neg      Lab Review   Orders Only on 07/14/2020   Component Date Value    WBC 07/14/2020 5.9     RBC 07/14/2020 4.75     Hemoglobin 07/14/2020 13.9     Hematocrit 07/14/2020 43.2     MCV 07/14/2020 90.8     MCH 07/14/2020 29.2     MCHC 07/14/2020 32.1     RDW 07/14/2020 15.6*    Platelets 06/09/7866 218     MPV 07/14/2020 9.0     Neutrophils % 07/14/2020 71.2     Lymphocytes % 07/14/2020 18.3     Monocytes % 07/14/2020 8.4     Eosinophils % 07/14/2020 1.2     Basophils % 07/14/2020 0.9     Neutrophils Absolute 07/14/2020 4.2     Lymphocytes Absolute 07/14/2020 1.1     Monocytes Absolute 07/14/2020 0.5     Eosinophils Absolute 07/14/2020 0.1     Basophils Absolute 07/14/2020 0.1     Sodium 07/14/2020 146*    Potassium 07/14/2020 4.4     Chloride 07/14/2020 106     CO2 07/14/2020 26     Anion Gap 07/14/2020 14     Glucose 07/14/2020 86     BUN 07/14/2020 10     CREATININE 07/14/2020 0.6     GFR Non- 07/14/2020 >60     GFR  07/14/2020 >60     Calcium 07/14/2020 9.8     Total Protein 07/14/2020 6.5     Alb 07/14/2020 4.5     Albumin/Globulin Ratio 07/14/2020 2.3*    Total Bilirubin 07/14/2020 0.5     Alkaline Phosphatase 07/14/2020 61     ALT 07/14/2020 8*    AST 07/14/2020 20     Globulin 07/14/2020 2.0     Vitamin B-12 07/14/2020 884     TSH 07/14/2020 1.01          Assessment/Plan     1. Constipation, unspecified constipation type  - stop Miralax  -Colace 100mg 2 times daily  -Metamucil 1 tablespoon daily  -high fiber diet    2. Nausea  - CT ABDOMEN PELVIS W IV CONTRAST Additional Contrast? Radiologist Recommendation; Future  - POCT Urinalysis no Micro  - ondansetron (ZOFRAN) 4 MG tablet; Take 1 tablet by mouth 3 times daily as needed for Nausea or Vomiting  Dispense: 15 tablet; Refill: 0    3. Dysuria  - POCT Urinalysis no Micro    4. Abdominal pain, unspecified abdominal location  - CT ABDOMEN PELVIS W IV CONTRAST Additional Contrast? Radiologist Recommendation; Future  - POCT Urinalysis no Micro    5. Major depressive disorder, remission status unspecified, unspecified whether recurrent  - buPROPion (WELLBUTRIN XL) 300 MG extended release tablet; Take 1 tablet by mouth every morning  Dispense: 90 tablet; Refill: 0    6. Memory problem  - donepezil (ARICEPT) 5 MG tablet; Take 1 tablet by mouth nightly  Dispense: 90 tablet; Refill: 0    7. Neck pain  - lidocaine (LIDODERM) 5 %; APPLY 1 PATCH TO SKIN ONCE DAILY FOR UP TO 12 HOURS ON AND THEN 12 HOURS OFF  Dispense: 90 patch; Refill: 0  - Tylenol 650mg 3 times daily as needed    8. Leukocytes in urine  - Culture, Urine      Discussed medications with patient, who voiced understanding of their use and indications. All questions answered. Return in about 2 weeks (around 8/19/2020) for constipation, abdominal pain.

## 2020-08-06 RX ORDER — BUPROPION HYDROCHLORIDE 300 MG/1
300 TABLET ORAL EVERY MORNING
Qty: 30 TABLET | Refills: 1 | OUTPATIENT
Start: 2020-08-06

## 2020-08-07 LAB
ORGANISM: ABNORMAL
URINE CULTURE, ROUTINE: ABNORMAL

## 2020-08-12 ENCOUNTER — HOSPITAL ENCOUNTER (OUTPATIENT)
Dept: CT IMAGING | Age: 85
Discharge: HOME | End: 2020-08-12
Payer: MEDICARE

## 2020-08-12 PROCEDURE — 74177 CT ABD & PELVIS W/CONTRAST: CPT

## 2020-08-12 PROCEDURE — 6360000004 HC RX CONTRAST MEDICATION: Performed by: INTERNAL MEDICINE

## 2020-08-12 RX ADMIN — IOHEXOL 50 ML: 240 INJECTION, SOLUTION INTRATHECAL; INTRAVASCULAR; INTRAVENOUS; ORAL at 08:09

## 2020-08-12 RX ADMIN — IOPAMIDOL 80 ML: 755 INJECTION, SOLUTION INTRAVENOUS at 08:09

## 2020-08-14 ENCOUNTER — TELEPHONE (OUTPATIENT)
Dept: PRIMARY CARE CLINIC | Age: 85
End: 2020-08-14

## 2020-08-14 RX ORDER — CIPROFLOXACIN 250 MG/1
250 TABLET, FILM COATED ORAL 2 TIMES DAILY
Qty: 14 TABLET | Refills: 0 | Status: SHIPPED | OUTPATIENT
Start: 2020-08-14 | End: 2020-08-21

## 2020-08-14 NOTE — TELEPHONE ENCOUNTER
----- Message from Jeannie Valencia sent at 8/14/2020 11:24 AM EDT -----  Subject: Results Request    QUESTIONS  Which lab or imaging result is the patient calling about? CT Scan   Which provider ordered the test? Melissa Nye   At what location was the test performed? Date the test was performed? 2020-08-12  Additional Information for Provider? CT of abdomen performed @ [facility]  ---------------------------------------------------------------------------  --------------  [address]  What is the best way for the office to contact you? OK to leave message on   voicemail  Preferred Call Back Phone Number?  [phone]

## 2020-08-19 ENCOUNTER — TELEPHONE (OUTPATIENT)
Dept: PRIMARY CARE CLINIC | Age: 85
End: 2020-08-19

## 2020-08-21 ENCOUNTER — TELEPHONE (OUTPATIENT)
Dept: PRIMARY CARE CLINIC | Age: 85
End: 2020-08-21

## 2020-08-21 NOTE — TELEPHONE ENCOUNTER
----- Message from Manual Suraj sent at 8/21/2020  3:25 PM EDT -----  Subject: Message to Provider    QUESTIONS  Information for Provider? Gail Morley (on HIPPA form) calling to give Dr. Luzmaria Ledesma the name of audiologist [facility]) that going to do the   audio exam for her mother. The fax # to send the referral is [phone]   Please return call regarding pre op exam for this patient  ---------------------------------------------------------------------------  --------------  [address]  What is the best way for the office to contact you? OK to leave message on   voicemail  Preferred Call Back Phone Number? [phone]  ---------------------------------------------------------------------------  --------------  SCRIPT ANSWERS  Relationship to Patient? Other  Representative Name? Gail Morley  Is the Representative on the appropriate HIPAA document in Epic?  Yes

## 2020-08-27 ENCOUNTER — OFFICE VISIT (OUTPATIENT)
Dept: PRIMARY CARE CLINIC | Age: 85
End: 2020-08-27
Payer: MEDICARE

## 2020-08-27 VITALS
HEIGHT: 64 IN | RESPIRATION RATE: 12 BRPM | TEMPERATURE: 97.1 F | WEIGHT: 113.8 LBS | HEART RATE: 63 BPM | OXYGEN SATURATION: 99 % | DIASTOLIC BLOOD PRESSURE: 82 MMHG | SYSTOLIC BLOOD PRESSURE: 148 MMHG | BODY MASS INDEX: 19.43 KG/M2

## 2020-08-27 PROCEDURE — G8420 CALC BMI NORM PARAMETERS: HCPCS | Performed by: INTERNAL MEDICINE

## 2020-08-27 PROCEDURE — 4040F PNEUMOC VAC/ADMIN/RCVD: CPT | Performed by: INTERNAL MEDICINE

## 2020-08-27 PROCEDURE — 99214 OFFICE O/P EST MOD 30 MIN: CPT | Performed by: INTERNAL MEDICINE

## 2020-08-27 PROCEDURE — G8427 DOCREV CUR MEDS BY ELIG CLIN: HCPCS | Performed by: INTERNAL MEDICINE

## 2020-08-27 PROCEDURE — 1036F TOBACCO NON-USER: CPT | Performed by: INTERNAL MEDICINE

## 2020-08-27 PROCEDURE — G8400 PT W/DXA NO RESULTS DOC: HCPCS | Performed by: INTERNAL MEDICINE

## 2020-08-27 PROCEDURE — 1090F PRES/ABSN URINE INCON ASSESS: CPT | Performed by: INTERNAL MEDICINE

## 2020-08-27 PROCEDURE — 1123F ACP DISCUSS/DSCN MKR DOCD: CPT | Performed by: INTERNAL MEDICINE

## 2020-08-27 RX ORDER — IPRATROPIUM BROMIDE 21 UG/1
2 SPRAY, METERED NASAL 2 TIMES DAILY PRN
Qty: 1 BOTTLE | Refills: 1 | Status: SHIPPED | OUTPATIENT
Start: 2020-08-27 | End: 2021-05-06

## 2020-08-27 RX ORDER — LISINOPRIL 40 MG/1
TABLET ORAL
COMMUNITY
Start: 2020-08-20 | End: 2020-09-25 | Stop reason: SDUPTHER

## 2020-08-27 ASSESSMENT — ENCOUNTER SYMPTOMS
COUGH: 0
ABDOMINAL PAIN: 0
NAUSEA: 0
WHEEZING: 0
SORE THROAT: 0
CONSTIPATION: 1
CHEST TIGHTNESS: 0
DIARRHEA: 0
SINUS PRESSURE: 0
BLOOD IN STOOL: 0
RHINORRHEA: 1
SHORTNESS OF BREATH: 0
TROUBLE SWALLOWING: 0
VOMITING: 0

## 2020-08-27 NOTE — PATIENT INSTRUCTIONS
-Continue same medications  -Avoid anti-inflammatories, aspirin, and fish oil for 7 days prior to surgery  -Tylenol 650mg 3 times daily if needed for pain  -Nothing to eat or drink after midnight prior to surgery

## 2020-08-27 NOTE — PROGRESS NOTES
Jose Sow   YOB: 1935    Date of Visit:  8/27/2020    Chief Complaint   Patient presents with    Pre-op Exam     cataract 8/31/20 9/14/20 Liz Browne MD [facility]       HPI  Pt presents for a preoperative history and physical exam. Pt is scheduled for Cataract surgery to be done on 8/31/20 and 9/14/20 by Dr. Liz Browne for bilateral cataracts. Constipation- Pt states Miralax makes her stomach sore. Pt has taken Colace 100mg p bid but ran out 3-4 days ago. Pt is not sure how to take Citrucel that she bought. Pt states she was given Dulcolax by her daughter in law. Pt states one time tablets were red and one time the tablets were white so she states she isn't going to take either because of different color of tablets. Hypertension- Pt takes Lisinopril 40mg po q day and Amlodipine 2.5mg po q day. Pt decreases salt. Pt is not exercising.     Hyperlipidemia- Pt takes Rosuvastatin 20mg po q day. Pt decreases fat and cholesterol.     Depression- Pt states her depression is better. Pt states she stopped Wellbutrin XL in March.     Hypothyroidism- Pt takes Levothyroxine 75mg po q day. Pt denies fatigue and weight gain. Pt c/o constipation, cold intolerance, and weight loss.     Coronary artery disease-Pt takes Isosorbide mononitrate 180mg po q day, Rosuvastatin 20mg po q day, and aspirin 81mg po q day. Pt denies chest pain. Pt sees Cardiology.     Tremor- Pt has benign essential tremor. Pt takes Primidone 50mg po qhs. Pt stopped taking it because it blurred vision and made her feel weak.     Nonallergic rhinitis-Pt c/o runny nose with eating. Pt states she left her son's home on Sunday 8/23/20. Pt is staying at a [facility]. Pt brought in a bag of medications during visit and doesn't recognize the medications or know what they are for. Review of Systems   Constitutional: Positive for unexpected weight change. Negative for appetite change, chills and fever.    HENT: Positive for rhinorrhea. Negative for congestion, ear pain, postnasal drip, sinus pressure, sneezing, sore throat and trouble swallowing. Eyes: Negative for visual disturbance. Respiratory: Negative for cough, chest tightness, shortness of breath and wheezing. Cardiovascular: Negative for chest pain, palpitations and leg swelling. Gastrointestinal: Positive for constipation. Negative for abdominal pain, blood in stool, diarrhea, nausea and vomiting. Genitourinary: Negative for dysuria, frequency and hematuria. Neurological: Negative for dizziness, seizures, syncope, facial asymmetry, light-headedness, numbness and headaches. Hematological: Negative for adenopathy. Does not bruise/bleed easily. Psychiatric/Behavioral: Positive for dysphoric mood.        Past Medical History:   Diagnosis Date    CAD (coronary artery disease)     Cancer ([facility] [address]     breast    Cerebral artery occlusion with cerebral infarction ([facility]     Chronic mid back pain     Hearing deficit     History of alcohol abuse     IN THE PAST    History of breast cancer     Hyperlipidemia     Hypertension     Macular degeneration     Medial meniscus tear     Mixed hyperlipidemia 9/17/2019    Osteoarthritis of knee     Osteoporosis     Peripheral neuropathy     Postsurgical hypothyroidism     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    Retinal dystrophy     Tremor        Past Surgical History:   Procedure Laterality Date    BREAST LUMPECTOMY      bilateral    HYSTERECTOMY      KNEE CARTILAGE SURGERY      SKIN BIOPSY Right 12/24/2019    EXCISION OF SOFT TISSUE MASS RIGHT BACK AND EXCISION OF SKIN LESION RIGHT BACK performed by Misa Ruelas MD at [address] [place] [address]         Outpatient Medications Marked as Taking for the 8/27/20 encounter (Office Visit) with Reji Mcnair MD   Medication Sig Dispense Refill    lisinopril (PRINIVIL;ZESTRIL) 40 MG tablet TK 1 T PO  QAM      ipratropium (ATROVENT) 0.03 % nasal spray 2 sprays by Nasal route 2 times daily as needed for Rhinitis 1 Bottle 1    Multiple Vitamins-Minerals (THERAPEUTIC MULTIVITAMIN-MINERALS) tablet Take 1 tablet by mouth daily      ondansetron (ZOFRAN) 4 MG tablet Take 1 tablet by mouth 3 times daily as needed for Nausea or Vomiting 15 tablet 0    lidocaine (LIDODERM) 5 % APPLY 1 PATCH TO SKIN ONCE DAILY FOR UP TO 12 HOURS ON AND THEN 12 HOURS OFF 90 patch 0    buPROPion (WELLBUTRIN XL) 300 MG extended release tablet Take 1 tablet by mouth every morning 90 tablet 0    levothyroxine (SYNTHROID) 75 MCG tablet Take 1 tablet by mouth Daily 90 tablet 1    aspirin (ASPIRIN 81) 81 MG EC tablet Take 1 tablet by mouth daily      nitroGLYCERIN (NITROSTAT) 0.4 MG SL tablet Place 0.4 mg under the tongue every 5 minutes as needed for Chest pain up to max of 3 total doses. If no relief after 1 dose, call 911.       isosorbide mononitrate (IMDUR) 120 MG extended release tablet Take 1.5 tablets by mouth daily 90 tablet 3         No Known Allergies    Social History     Tobacco Use    Smoking status: Former Smoker    Smokeless tobacco: Never Used   Substance Use Topics    Alcohol use: Yes     Comment: rare       Family History   Problem Relation Age of Onset    Heart Disease Mother     Cancer Father        Immunization History   Administered Date(s) Administered    Influenza, High Dose (Fluzone 65 yrs and older) 11/19/2014, 11/08/2016, 10/10/2017, 09/11/2018    Influenza, Triv, inactivated, subunit, adjuvanted, IM (Fluad 65 yrs and older) 03/10/2020    Pneumococcal Polysaccharide ([id number]) 06/29/2007, 11/19/2014       Vitals:    08/27/20 0826 08/27/20 0833   BP: (!) 152/80 (!) 148/82   Site: Right Upper Arm Right Upper Arm   Position: Sitting Sitting   Cuff Size: Medium Adult Medium Adult   Pulse: 63    Resp: 12    Temp: 97.1 °F (36.2 °C)    TempSrc: Infrared    SpO2: 99%    Weight: 113 lb 12.8 oz (51.6 kg)    Height: 5' 4.25\" (1.632 m) 2. Preoperative examination  -Preoperative history and physical done   -Patient is in satisfactory condition for Cataract surgery to be done on 8/31/20 and 9/14/20 by Dr. Pamella Schaumann same medications  -Avoid anti-inflammatories, aspirin, and fish oil for 7 days prior to surgery  -Tylenol 650mg 3 times daily if needed for pain  -Nothing to eat or drink after midnight prior to surgery    3. Constipation, unspecified constipation type  -Discussed Citrucel with patient  -resume Colace 100mg po bid  -Dulcolax as needed    4. Essential hypertension  -Blood pressure is elevated today  -Patient is confused about her medication and may not have taken it  -Low sodium diet    5. Mixed hyperlipidemia  -Continue same medications  -Low fat, low cholesterol diet  -Regular aerobic exercise    6. Major depressive disorder, remission status unspecified, unspecified whether recurrent  -stable  -Continue same medications    7. Postsurgical hypothyroidism  -stable  -Continue same medications    8. Coronary artery disease involving native coronary artery of native heart with angina pectoris ([facility] [address]  -stable  -Continue same medications  -Continue care per Cardiology    9. Benign essential tremor  -stable  -Continue same medications    10. Nonallergic rhinitis  - ipratropium (ATROVENT) 0.03 % nasal spray; 2 sprays by Nasal route 2 times daily as needed for Rhinitis  Dispense: 1 Bottle; Refill: 1    11. Lack of housing  - patient is confused about her medications  - patient needs immediate housing   - discussed with patient's son  - Referral to [facility] Social Work, [place]        Return in about 4 weeks (around 9/24/2020) for constipation.

## 2020-08-28 ENCOUNTER — TELEPHONE (OUTPATIENT)
Dept: PRIMARY CARE CLINIC | Age: 85
End: 2020-08-28

## 2020-08-28 NOTE — TELEPHONE ENCOUNTER
----- Message from John Pina sent at 8/28/2020  9:10 AM EDT -----  Subject: Message to Provider    QUESTIONS  Information for Provider? patient son Good Silveira was told yesterday to contact   Gurvinder Arceo for an emergency for his mother jorge luis edwards needs to   call him back asa at [phone] patient saw dr. J Carlos Rogers yesterday  ---------------------------------------------------------------------------  --------------  [facility] INFO  What is the best way for the office to contact you? OK to leave message on   voicemail  Preferred Call Back Phone Number? [phone]  ---------------------------------------------------------------------------  --------------  SCRIPT ANSWERS  Relationship to Patient? Other  Representative Name? Good rivera  Is the Representative on the appropriate HIPAA document in Epic?  Yes

## 2020-08-31 ENCOUNTER — TELEPHONE (OUTPATIENT)
Dept: PRIMARY CARE CLINIC | Age: 85
End: 2020-08-31

## 2020-08-31 NOTE — TELEPHONE ENCOUNTER
SW Contact VM from son to this writer stating patient was safely moved in to  [address] [place] [address] over the weekend.

## 2020-09-01 ENCOUNTER — TELEPHONE (OUTPATIENT)
Dept: PRIMARY CARE CLINIC | Age: 85
End: 2020-09-01

## 2020-09-01 NOTE — TELEPHONE ENCOUNTER
Darlyn Lang from [facility] called and is following up on a fax for pt. He is requesting for the doctor to sign the faxed paperwork and fax back as soon as possible. Thank you in advance.     Fax number is: [phone]

## 2020-09-02 ENCOUNTER — TELEPHONE (OUTPATIENT)
Dept: PRIMARY CARE CLINIC | Age: 85
End: 2020-09-02

## 2020-09-02 RX ORDER — ISOSORBIDE MONONITRATE 120 MG/1
TABLET, EXTENDED RELEASE ORAL
Qty: 135 TABLET | Refills: 1 | Status: SHIPPED | OUTPATIENT
Start: 2020-09-02 | End: 2021-03-10 | Stop reason: SINTOL

## 2020-09-02 NOTE — TELEPHONE ENCOUNTER
Moni Amado from [facility] is calling. She needs some clarification on the medication list that was sent to them. Please call them back. Pt is moving in today.

## 2020-09-14 ENCOUNTER — NURSE TRIAGE (OUTPATIENT)
Dept: OTHER | Facility: CLINIC | Age: 85
End: 2020-09-14

## 2020-09-14 NOTE — TELEPHONE ENCOUNTER
Patient called [place] pre-service center [facility]) to schedule appointment with red flag complaint; transferred to Nurse Access for triage by Beth Field. Pt calls to report symptoms of upper abdominal pain. Pt states pain started one month ago. Rates the pain as a constant moderate but severe at times. Reports pain is worse with eating or drinking anything. States she is nauseated at times. Denies chest pain. Informed of disposition. Care advice as documented. Instructed to call back with worsening symptoms. Verbalized understanding and denies any further questions/concerns. Please do not respond to the triage nurse through this encounter. Any subsequent communication should be directly with the patient. Reason for Disposition   Pain lasting > 10 minutes and over 48years old    Answer Assessment - Initial Assessment Questions  1. LOCATION: \"Where does it hurt? \"       Upper abdomen  2. RADIATION: \"Does the pain shoot anywhere else? \" (e.g., chest, back)       no  3. ONSET: \"When did the pain begin? \" (e.g., minutes, hours or days ago)       One month ago  4. SUDDEN: \"Gradual or sudden onset?\"        5. PATTERN \"Does the pain come and go, or is it constant? \"     - If constant: \"Is it getting better, staying the same, or worsening? \"       (Note: Constant means the pain never goes away completely; most serious pain is constant and it progresses)      - If intermittent: \"How long does it last?\" \"Do you have pain now? \"      (Note: Intermittent means the pain goes away completely between bouts)      constant  6. SEVERITY: \"How bad is the pain? \"  (e.g., Scale 1-10; mild, moderate, or severe)     - MILD (1-3): doesn't interfere with normal activities, abdomen soft and not tender to touch      - MODERATE (4-7): interferes with normal activities or awakens from sleep, tender to touch      - SEVERE (8-10): excruciating pain, doubled over, unable to do any normal activities        Moderate and severe at time  7. RECURRENT SYMPTOM: \"Have you ever had this type of abdominal pain before? \" If so, ask: \"When was the last time? \" and \"What happened that time? \"       NO  8. AGGRAVATING FACTORS: \"Does anything seem to cause this pain? \" (e.g., foods, stress, alcohol)      Eating and drinking anything  9. CARDIAC SYMPTOMS: \"Do you have any of the following symptoms: chest pain, difficulty breathing, sweating, nausea? \"      Nauseated at times  10. OTHER SYMPTOMS: \"Do you have any other symptoms? \" (e.g., fever, vomiting, diarrhea)        No  11. PREGNANCY: \"Is there any chance you are pregnant? \" \"When was your last menstrual period? \"        No    Protocols used: ABDOMINAL PAIN - UPPER-ADULT-[place]

## 2020-09-25 RX ORDER — DONEPEZIL HYDROCHLORIDE 5 MG/1
5 TABLET, FILM COATED ORAL NIGHTLY
Qty: 90 TABLET | Refills: 1 | Status: SHIPPED | OUTPATIENT
Start: 2020-09-25 | End: 2021-05-06

## 2020-09-25 RX ORDER — PRIMIDONE 50 MG/1
50 TABLET ORAL NIGHTLY
Qty: 90 TABLET | Refills: 1 | Status: SHIPPED | OUTPATIENT
Start: 2020-09-25 | End: 2021-05-06

## 2020-09-25 RX ORDER — LISINOPRIL 40 MG/1
TABLET ORAL
Qty: 90 TABLET | Refills: 1 | Status: CANCELLED | OUTPATIENT
Start: 2020-09-25

## 2020-09-25 RX ORDER — LISINOPRIL 40 MG/1
40 TABLET ORAL DAILY
Qty: 90 TABLET | Refills: 1 | Status: SHIPPED | OUTPATIENT
Start: 2020-09-25 | End: 2021-05-06

## 2020-09-25 NOTE — TELEPHONE ENCOUNTER
Pt is calling. She wants a script for Lisinopril [facility]  Also, wants Primidone 50 mg sent to [facility]   Also needs Aricept 5 mg. Pt says she is now in [facility].     LOV 8/27/20

## 2020-09-25 NOTE — TELEPHONE ENCOUNTER
Medication:   Requested Prescriptions     Pending Prescriptions Disp Refills    primidone (MYSOLINE) 50 MG tablet 90 tablet 0     Sig: Take 1 tablet by mouth nightly    lisinopril (PRINIVIL;ZESTRIL) 40 MG tablet 90 tablet 1     Sig: TK 1 T PO  QAM    donepezil (ARICEPT) 5 MG tablet 90 tablet 0     Sig: Take 1 tablet by mouth nightly     Last Filled:  4/14/20    Last appt: 8/27/2020   Next appt: Visit date not found    Last Labs DM: No results found for: LABA1C  Last Lipid:   Lab Results   Component Value Date    CHOL 180 02/27/2020    TRIG 49 02/27/2020    HDL 98 02/27/2020    LDLCALC 72 02/27/2020     Last PSA: No results found for: PSA  Last Thyroid:   Lab Results   Component Value Date    TSH 1.01 07/14/2020

## 2020-09-29 ENCOUNTER — TELEPHONE (OUTPATIENT)
Dept: PRIMARY CARE CLINIC | Age: 85
End: 2020-09-29

## 2020-09-29 NOTE — TELEPHONE ENCOUNTER
Pt voices concerns that the nurse @ the assistant living is refusing to decrease her isosorbide mononitrate (IMDUR)   To  120 MG extended release tablet   Pt states the last time she saw Dr Obey Lozada he decreased her medication from 180 mg to 120 mg. Please see last encounter on 06/23/20 with cande he did decrease to 120 due to pt having side effects.  Can we please send a copy of pt medication list.

## 2020-10-07 ENCOUNTER — TELEPHONE (OUTPATIENT)
Dept: PRIMARY CARE CLINIC | Age: 85
End: 2020-10-07

## 2020-10-07 NOTE — TELEPHONE ENCOUNTER
----- Message from Aidee David sent at 10/7/2020  9:37 AM EDT -----  Subject: Message to Provider    QUESTIONS  Information for Provider? Patient would like to discontinue taking a   medication   buPROPion (WELLBUTRIN XL) 300. States she moved to an assisted living   facility and feels like she no longer needs it. States that she is no   longer depressed. ---------------------------------------------------------------------------  --------------  Kayla Grass KENZIE  What is the best way for the office to contact you? OK to leave message on   voicemail  Preferred Call Back Phone Number? [phone]  ---------------------------------------------------------------------------  --------------  SCRIPT ANSWERS  Relationship to Patient?  Self

## 2020-10-07 NOTE — TELEPHONE ENCOUNTER
----- Message from DynaPumpt sent at 10/7/2020  2:05 PM EDT -----  Subject: Referral Request    QUESTIONS   Reason for referral request? Psychiatry for hallucinations   Has the physician seen you for this condition before? No   Preferred Specialist (if applicable)? Do you already have an appointment scheduled? Additional Information for Provider?   ---------------------------------------------------------------------------  --------------  CALL BACK INFO  What is the best way for the office to contact you? OK to leave message on   voicemail  Preferred Call Back Phone Number?  [phone]

## 2020-10-09 ENCOUNTER — NURSE TRIAGE (OUTPATIENT)
Dept: OTHER | Facility: CLINIC | Age: 85
End: 2020-10-09

## 2020-10-09 NOTE — TELEPHONE ENCOUNTER
Reason for Disposition   Caller has URGENT medication question about med that PCP or specialist prescribed and triager unable to answer question    Answer Assessment - Initial Assessment Questions  1. NAME of MEDICATION: \"What medicine are you calling about? \"      Wellbutrin XL 300mg  2. QUESTION: Blanca Villanueva is your question? \"     Pt states she is having hallucinations  3. PRESCRIBING HCP: \"Who prescribed it? \" Reason: if prescribed by specialist, call should be referred to that group. PCP  4. SYMPTOMS: \"Do you have any symptoms? \"      Hallucinations - only with objects and object placement. Constipation, blurry vision   5. SEVERITY: If symptoms are present, ask \"Are they mild, moderate or severe? \"      Mild -   6. PREGNANCY:  \"Is there any chance that you are pregnant? \" \"When was your last menstrual period? \"     no    Protocols used: MEDICATION QUESTION CALL-ADULT-[place]    Patient called pre-service center [facility] [place] with red flag complaint. Brief description of triage: pt reports having hallucinations, dry mouth, constipation, blurry vision. She is requesting a different medication for Wellbutrin 300mg that does not have side affects. Triage indicates for patient to be seen today in office    Care advice provided, patient verbalizes understanding; denies any other questions or concerns; instructed to call back for any new or worsening symptoms. .    Attention Provider: Thank you for allowing me to participate in the care of your patient. The patient was connected to triage in response to information provided to the [facility]. Please do not respond through this encounter as the response is not directed to a shared pool.

## 2020-10-12 ENCOUNTER — TELEPHONE (OUTPATIENT)
Dept: CARDIOLOGY CLINIC | Age: 85
End: 2020-10-12

## 2020-10-12 ENCOUNTER — TELEPHONE (OUTPATIENT)
Dept: PRIMARY CARE CLINIC | Age: 85
End: 2020-10-12

## 2020-10-12 NOTE — TELEPHONE ENCOUNTER
Son states pt has had recurrent chest pain over the last week prior to a colonoscopy and states a stress test is  Now required to see if pt can undergo procedure. Please place order and call son Benjie Belia to schedule.

## 2020-10-12 NOTE — TELEPHONE ENCOUNTER
Pt is now in [facility]. She is going to switch to a provider at their location. She says thank you for the patient care she has received from Dr. Mirna Sanchez.

## 2020-10-12 NOTE — TELEPHONE ENCOUNTER
----- Message from Bushra Bryant sent at 10/12/2020 10:29 AM EDT -----  Subject: Message to Provider    QUESTIONS  Information for Provider? Patient previously was seen by a cardiologist   and was told that if she has any chest pain to schedule a stress test.  ---------------------------------------------------------------------------  --------------  CALL BACK INFO  What is the best way for the office to contact you? OK to leave message on   voicemail  Preferred Call Back Phone Number? [phone]  ---------------------------------------------------------------------------  --------------  SCRIPT ANSWERS  Relationship to Patient? Other  Representative Name? John Wofle  Additional information verified (besides Name and Date of Birth)? Address  Appointment reason? Symptomatic  Select script based on patient symptoms? Adult Chest Pain [Angina   MI   Heart Attack]  Are you currently having chest pain? No  Has your pain started or worsened within the past 3 days? No  Are you having difficulty breathing? No  Have you previously seen a provider for this pain?  Yes

## 2020-10-12 NOTE — TELEPHONE ENCOUNTER
Eliazar with patients son Amanda Hollis, and told him he needs to contact Dr. Italia Johnson office for a stress test.

## 2020-10-15 NOTE — TELEPHONE ENCOUNTER
Spoke with son. They are in the process of changing PCP and moving pt into assisted living at this time. Pt MAY be switching doctors. Son is aware to call us and let us know what he needs from us when they get things in order. Pt agreeable.

## 2020-11-04 ENCOUNTER — TELEPHONE (OUTPATIENT)
Dept: PRIMARY CARE CLINIC | Age: 85
End: 2020-11-04

## 2020-11-04 NOTE — TELEPHONE ENCOUNTER
Charissa Jama from the assistant living would like Dr. Geo Rai to give a call to the pt explains to her that she needs to take the one and half Tab from     isosorbide mononitrate (IMDUR) 120 MG extended release tablet [[id number]]     Order Details   Dose, Route, Frequency: As Directed    Dispense Quantity: 135 tablet  Refills: 1  Fills remaining: --             Sig: TAKE ONE AND ONE-HALF TABLETS DAILY         Pt keep insist that all she needs is 120 one tab per dr. Geo Rai.

## 2020-11-05 ENCOUNTER — TELEPHONE (OUTPATIENT)
Dept: CARDIOLOGY CLINIC | Age: 85
End: 2020-11-05

## 2020-11-30 RX ORDER — AMLODIPINE BESYLATE 5 MG/1
TABLET ORAL
Qty: 90 TABLET | Refills: 3 | Status: SHIPPED | OUTPATIENT
Start: 2020-11-30 | End: 2021-05-06

## 2020-12-15 ENCOUNTER — OFFICE VISIT (OUTPATIENT)
Dept: CARDIOLOGY CLINIC | Age: 85
End: 2020-12-15
Payer: MEDICARE

## 2020-12-15 VITALS
HEIGHT: 64 IN | HEART RATE: 66 BPM | SYSTOLIC BLOOD PRESSURE: 130 MMHG | BODY MASS INDEX: 20.96 KG/M2 | OXYGEN SATURATION: 96 % | WEIGHT: 122.8 LBS | TEMPERATURE: 97.9 F | DIASTOLIC BLOOD PRESSURE: 72 MMHG

## 2020-12-15 PROCEDURE — 1090F PRES/ABSN URINE INCON ASSESS: CPT | Performed by: INTERNAL MEDICINE

## 2020-12-15 PROCEDURE — G8427 DOCREV CUR MEDS BY ELIG CLIN: HCPCS | Performed by: INTERNAL MEDICINE

## 2020-12-15 PROCEDURE — 1036F TOBACCO NON-USER: CPT | Performed by: INTERNAL MEDICINE

## 2020-12-15 PROCEDURE — 1123F ACP DISCUSS/DSCN MKR DOCD: CPT | Performed by: INTERNAL MEDICINE

## 2020-12-15 PROCEDURE — 99214 OFFICE O/P EST MOD 30 MIN: CPT | Performed by: INTERNAL MEDICINE

## 2020-12-15 PROCEDURE — G8484 FLU IMMUNIZE NO ADMIN: HCPCS | Performed by: INTERNAL MEDICINE

## 2020-12-15 PROCEDURE — G8400 PT W/DXA NO RESULTS DOC: HCPCS | Performed by: INTERNAL MEDICINE

## 2020-12-15 PROCEDURE — 4040F PNEUMOC VAC/ADMIN/RCVD: CPT | Performed by: INTERNAL MEDICINE

## 2020-12-15 PROCEDURE — G8420 CALC BMI NORM PARAMETERS: HCPCS | Performed by: INTERNAL MEDICINE

## 2020-12-15 ASSESSMENT — ENCOUNTER SYMPTOMS
APNEA: 0
BACK PAIN: 0
TROUBLE SWALLOWING: 0
DIARRHEA: 0
PHOTOPHOBIA: 0
CONSTIPATION: 0
WHEEZING: 0
STRIDOR: 0
BLOOD IN STOOL: 0
NAUSEA: 0
ABDOMINAL PAIN: 0
VOMITING: 0
EYE PAIN: 0
COLOR CHANGE: 0
CHEST TIGHTNESS: 0
ABDOMINAL DISTENTION: 0

## 2020-12-15 NOTE — LETTER
[facility]  [address] [address]  Phone: [phone]  Fax: [phone]    Eliezer Travis MD        12/15/2020      Beatrice De La Rosan YOB: 1935 has an acceptable risk for a non cardiac surgery. No further cardiac work up needed at this time. If there are any questions, please feel free to contact my office at [phone].       Sincerely,            Eliezer Travis MD

## 2020-12-15 NOTE — PROGRESS NOTES
[facility]     Outpatient Cardiology         Chief Complaint   Patient presents with    Atrial Fibrillation       HPI     Lexii Cross a 82 y.o. female here for follow up for HTN, HLD, CAD. Hypertension, well-controlled current medications. We will continue  None obstructive coronary disease. Continue risk factor management. Had a stress test in January of last year in [facility] within normal limits. Chronic systolic heart failure, nonischemic, likely related to hypertension, continue ACE inhibitor, did not like beta-blockers in the past.  Hyperlipidemia, continue statin. Hx  She underwent a cardiac cath in 2014 that revealed minimal CAD with normal LVEDP. She also had a nuclear stress test in 2016 that was within normal limits.       PMH  Past Medical History:   Diagnosis Date    CAD (coronary artery disease)     Cancer ([facility] [address]     breast    Cerebral artery occlusion with cerebral infarction ([facility] [address] [facility]     Chronic mid back pain     Hearing deficit     History of alcohol abuse     IN THE PAST    History of breast cancer     Hyperlipidemia     Hypertension     Macular degeneration     Medial meniscus tear     Mixed hyperlipidemia 9/17/2019    Osteoarthritis of knee     Osteoporosis     Peripheral neuropathy     Postsurgical hypothyroidism     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    Retinal dystrophy     Tremor        PSH  Past Surgical History:   Procedure Laterality Date    BREAST LUMPECTOMY      bilateral    HYSTERECTOMY      KNEE CARTILAGE SURGERY      SKIN BIOPSY Right 12/24/2019    EXCISION OF SOFT TISSUE MASS RIGHT BACK AND EXCISION OF SKIN LESION RIGHT BACK performed by Jesu Hernandez MD at [address] [facility] TOTAL KNEE ARTHROPLASTY          Social HIstory  Social History     Tobacco Use    Smoking status: Former Smoker    Smokeless tobacco: Never Used   Substance Use Topics    Alcohol use: Yes     Comment: rare    Drug use: Never       Family History  Family History   Problem Relation Age of Onset    Heart Disease Mother     Cancer Father        Allergies   No Known Allergies    Medications:     Home Medications:  Were reviewed and are listed in nursing record. and/or listed below    Prior to Admission medications    Medication Sig Start Date End Date Taking? Authorizing Provider   amLODIPine (NORVASC) 5 MG tablet TAKE 1 TABLET DAILY 11/30/20  Yes Ismael Rosario MD   primidone (MYSOLINE) 50 MG tablet Take 1 tablet by mouth nightly 9/25/20  Yes Zohreh Abel MD   lisinopril (PRINIVIL;ZESTRIL) 40 MG tablet Take 1 tablet by mouth daily 9/25/20  Yes Zohreh Abel MD   isosorbide mononitrate (IMDUR) 120 MG extended release tablet TAKE ONE AND ONE-HALF TABLETS DAILY 9/2/20  Yes Zohreh Abel MD   ipratropium (ATROVENT) 0.03 % nasal spray 2 sprays by Nasal route 2 times daily as needed for Rhinitis 8/27/20 8/27/21 Yes Zohreh Abel MD   Multiple Vitamins-Minerals (THERAPEUTIC MULTIVITAMIN-MINERALS) tablet Take 1 tablet by mouth daily   Yes Historical Provider, MD   ondansetron (ZOFRAN) 4 MG tablet Take 1 tablet by mouth 3 times daily as needed for Nausea or Vomiting 8/5/20  Yes Zohreh Abel MD   lidocaine (LIDODERM) 5 % APPLY 1 PATCH TO SKIN ONCE DAILY FOR UP TO 12 HOURS ON AND THEN 12 HOURS OFF 8/5/20  Yes Zohreh Abel MD   docusate sodium (COLACE) 100 MG capsule Take 1 capsule by mouth 2 times daily 7/9/20  Yes Zohreh Abel MD   rosuvastatin (CRESTOR) 20 MG tablet Take 1 tablet by mouth daily 7/7/20  Yes Zohreh Abel MD   levothyroxine (SYNTHROID) 75 MCG tablet Take 1 tablet by mouth Daily 6/22/20  Yes Zohreh Abel MD   aspirin (ASPIRIN 81) 81 MG EC tablet Take 1 tablet by mouth daily 5/28/20  Yes Zohreh Abel MD   nitroGLYCERIN (NITROSTAT) 0.4 MG SL tablet Place 0.4 mg under the tongue every 5 minutes as needed for Chest pain up to max of 3 total doses. If no relief after 1 dose, call 911.    Yes Historical Provider, MD   vitamin D (CHOLECALCIFEROL) 1000 UNIT TABS tablet Take 1,000 Units by mouth daily   Yes Historical Provider, MD   donepezil (ARICEPT) 5 MG tablet Take 1 tablet by mouth nightly  Patient not taking: Reported on 12/15/2020 9/25/20   Latanya Tamayo MD   buPROPion (WELLBUTRIN XL) 300 MG extended release tablet Take 1 tablet by mouth every morning  Patient not taking: Reported on 12/15/2020 8/5/20   Latanya Tamayo MD        Review of Systems   Constitutional: Negative for activity change, appetite change, fatigue and unexpected weight change. HENT: Negative for hearing loss and trouble swallowing. Eyes: Negative for photophobia, pain and visual disturbance. Respiratory: Negative for apnea, chest tightness, wheezing and stridor. Cardiovascular: Negative for chest pain, palpitations and leg swelling. Gastrointestinal: Negative for abdominal distention, abdominal pain, blood in stool, constipation, diarrhea, nausea and vomiting. Genitourinary: Negative for flank pain and pelvic pain. Musculoskeletal: Negative for back pain and neck pain. Skin: Negative for color change and pallor. Neurological: Negative for dizziness, syncope, facial asymmetry, speech difficulty, weakness, light-headedness, numbness and headaches. Psychiatric/Behavioral: Negative for agitation, behavioral problems and confusion.        Vitals:    12/15/20 1539   BP: 130/72   Pulse: 66   Temp: 97.9 °F (36.6 °C)   SpO2: 96%    Weight: 122 lb 12.8 oz (55.7 kg)       Vitals:    12/15/20 1539   BP: 130/72   Site: Left Upper Arm   Position: Sitting   Cuff Size: Medium Adult   Pulse: 66   Temp: 97.9 °F (36.6 °C)   SpO2: 96%   Weight: 122 lb 12.8 oz (55.7 kg)   Height: 5' 4\" (1.626 m)       BP Readings from Last 3 Encounters:   12/15/20 130/72   08/27/20 (!) 148/82   08/05/20 118/76       Wt Readings from Last 3 Encounters:   12/15/20 122 lb 12.8 oz (55.7 kg)   08/27/20 113 lb 12.8 oz (51.6 kg)   08/05/20 122 lb (55.3 kg)       Physical Exam  Constitutional:       General: She is not in acute distress. Appearance: She is well-developed. She is not diaphoretic. HENT:      Head: Normocephalic and atraumatic. Nose: Nose normal.      Mouth/Throat:      Pharynx: No oropharyngeal exudate. Eyes:      General:         Right eye: No discharge. Left eye: No discharge. Conjunctiva/sclera: Conjunctivae normal.      Pupils: Pupils are equal, round, and reactive to light. Neck:      Musculoskeletal: Normal range of motion and neck supple. Vascular: No JVD. Trachea: No tracheal deviation. Cardiovascular:      Rate and Rhythm: Normal rate and regular rhythm. Heart sounds: Normal heart sounds. No murmur. No friction rub. No gallop. Pulmonary:      Effort: Pulmonary effort is normal. No respiratory distress. Breath sounds: Normal breath sounds. No stridor. No wheezing or rales. Chest:      Chest wall: No tenderness. Abdominal:      General: Bowel sounds are normal. There is no distension. Palpations: Abdomen is soft. Tenderness: There is no abdominal tenderness. There is no guarding or rebound. Musculoskeletal: Normal range of motion. General: No tenderness or deformity. Skin:     General: Skin is warm and dry. Capillary Refill: Capillary refill takes less than 2 seconds. Findings: No erythema. Neurological:      Mental Status: She is alert and oriented to person, place, and time. Motor: No abnormal muscle tone.    Psychiatric:         Behavior: Behavior normal.         Labs:       Lab Results   Component Value Date    WBC 5.9 07/14/2020    HGB 13.9 07/14/2020    HCT 43.2 07/14/2020    MCV 90.8 07/14/2020     07/14/2020     Lab Results   Component Value Date     (H) 07/14/2020    K 4.4 07/14/2020     07/14/2020    CO2 26 07/14/2020    BUN 10 07/14/2020    CREATININE 0.6 07/14/2020    GLUCOSE 86 07/14/2020    CALCIUM 9.8 07/14/2020    PROT 6.5 07/14/2020    LABALBU 4.5 07/14/2020    BILITOT 0.5 07/14/2020    ALKPHOS 61 07/14/2020    AST 20 07/14/2020    ALT 8 (L) 07/14/2020    LABGLOM >60 07/14/2020    GFRAA >60 07/14/2020    AGRATIO 2.3 (H) 07/14/2020    GLOB 2.0 07/14/2020         Lab Results   Component Value Date    CHOL 180 02/27/2020     Lab Results   Component Value Date    TRIG 49 02/27/2020     Lab Results   Component Value Date    HDL 98 (H) 02/27/2020     Lab Results   Component Value Date    LDLCALC 72 02/27/2020     Lab Results   Component Value Date    LABVLDL 10 02/27/2020     No results found for: [facility]    Lab Results   Component Value Date    INR 0.95 03/30/2020    PROTIME 11.0 03/30/2020       The ASCVD Risk score (Margarito Ayers., et al., 2013) failed to calculate for the following reasons: The 2013 ASCVD risk score is only valid for ages 36 to 78    The patient has a prior MI or stroke diagnosis      Imaging:       Last ECG (if available):  Sinus rhythm     Last Stress (if available): 1/10/19  FINDINGS:  The overall image quality of the study is good. Myocardial SPECT perfusion imaging following stress reveals uniform uptake of tracer throughout the left ventricle with artifact. There is a mild defect in the inferior region likely due to diaphragmatic attenuation. Rest images are similar. Prone imaging reveals resolution of the defect in the inferior region. Left ventricular cavity size is normal. The overall left ventricular systolic function is normal. The calculated left ventricular ejection fraction is 70 %. No regional wall thickening or wall motion abnormalities are present at rest. Transient left ventricular cavity dilation at stress is not present. SUMMARY:   1. Myocardial perfusion imaging is normal with artifact. 2. There is no scintigraphic evidence of myocardial ischemia. 3. There is no scintigraphic evidence of prior myocardial infarction.    4. There is no definite ECG evidence of ischemia. 5. Diaphragmatic attenuation artifact accounts for the defect in the inferior region. 6. The overall left ventricular systolic function is normal. The calculated left ventricular ejection fraction is 70 %. 7. Compared to the prior nuclear perfusion study from 8/31/2016, the findings are similar. 8/31/16  IMPRESSION  Myocardial perfusion imaging is within normal limits. The decreased tracer uptake in the apical region and the inferior and anterior areas of the apex is likely due to breast attenuation. There is borderline electrocardiographic but no clear   scintigraphic or clinical evidence of myocardial ischemia. There is no scintigraphic or electrocardiographic evidence of prior myocardial infarction. In addition, there is no scintigraphic evidence of left ventricular dysfunction. Compared to the prior   perfusion study of 11/18/2014, ischemia is no longer seen. Last Cath (if available):    Last TTE/NEVIN(if available): 3/30/20  Summary   Normal left ventricular size. Mild concentric left ventricular hypertrophy. Mildly decreased left ventricular systolic function with an estimated   ejection fraction of 45%-50%. Mild global hypokinesis. Grade I diastolic dysfunction with normal LV filling pressures. The left atrium is mildly dilated. Estimated pulmonary artery systolic pressure is at 25 mmHg assuming a right   atrial pressure of 3 mmHg. A bubble study was performed and fails to show evidence of shunting. Last CMR  (if available):      Assessment / Plan:     CAD (coronary artery disease)  Nonobstructive, continue risk factor management. Essential hypertension  Well-controlled current medications. No changes    Mixed hyperlipidemia  Continue statin. No side effects    Chronic systolic congestive heart failure (HCC)  ACE inhibitor. Did not like beta-blockers in the past.  Compensated    I had the opportunity to review the clinical symptoms and presentation of Brain Nenita. Patient's allergies and medications were reviewed and updated. Patient's past medical, surgical, social and family history were reviewed and updated. Patient's testing including laboratory, ECGs, monitor, imaging (TTE,NEVIN,CMR,cath) were reviewed. Tobacco use was discussed with the patient and educated on the negative effects. I have asked the patient to not utilize these agents. All questions and concerns were addressed to the patient/family. Alternatives to my treatment were discussed. The note was completed using EMR. Every effort wasmade to ensure accuracy; however, inadvertent computerized transcription errors may be present. Thank you for allowing me to participate in thecare or [place] Areli Nguyen MD, [facility]

## 2020-12-16 PROBLEM — I50.22 CHRONIC SYSTOLIC CONGESTIVE HEART FAILURE (HCC): Status: ACTIVE | Noted: 2020-12-16

## 2021-01-07 NOTE — TELEPHONE ENCOUNTER
This is an old message. Spoke with blas odonnell Nurse at patient's Assisted Living on 11/5/20 and clarified dose. I also informed her that I am no longer the patient's PCP and she is seeing the Doctor at the Assisted Living facility and they can contact the patient's new Provider for anything else that is needed. Cooperative/Awake/Alert

## 2021-03-10 ENCOUNTER — TELEPHONE (OUTPATIENT)
Dept: CARDIOLOGY CLINIC | Age: 86
End: 2021-03-10

## 2021-03-10 NOTE — TELEPHONE ENCOUNTER
The patient called stating that she is having bad side effects form her isosorbide medication pain in her neck, shoulders, hands, and also seeing red & whites squares when she looks at flat surfaces. Please call the patient back at [phone] to advise.

## 2021-04-08 ENCOUNTER — OFFICE VISIT (OUTPATIENT)
Dept: PRIMARY CARE CLINIC | Age: 86
End: 2021-04-08
Payer: MEDICARE

## 2021-04-08 VITALS
DIASTOLIC BLOOD PRESSURE: 68 MMHG | SYSTOLIC BLOOD PRESSURE: 138 MMHG | HEART RATE: 76 BPM | WEIGHT: 127.4 LBS | OXYGEN SATURATION: 98 % | BODY MASS INDEX: 21.87 KG/M2

## 2021-04-08 DIAGNOSIS — R51.9 NONINTRACTABLE HEADACHE, UNSPECIFIED CHRONICITY PATTERN, UNSPECIFIED HEADACHE TYPE: Primary | ICD-10-CM

## 2021-04-08 DIAGNOSIS — I10 ESSENTIAL HYPERTENSION: ICD-10-CM

## 2021-04-08 DIAGNOSIS — E78.2 MIXED HYPERLIPIDEMIA: ICD-10-CM

## 2021-04-08 DIAGNOSIS — I67.89 CEREBRAL MICROVASCULAR DISEASE: ICD-10-CM

## 2021-04-08 PROCEDURE — G8400 PT W/DXA NO RESULTS DOC: HCPCS | Performed by: INTERNAL MEDICINE

## 2021-04-08 PROCEDURE — G8427 DOCREV CUR MEDS BY ELIG CLIN: HCPCS | Performed by: INTERNAL MEDICINE

## 2021-04-08 PROCEDURE — 99214 OFFICE O/P EST MOD 30 MIN: CPT | Performed by: INTERNAL MEDICINE

## 2021-04-08 PROCEDURE — 1036F TOBACCO NON-USER: CPT | Performed by: INTERNAL MEDICINE

## 2021-04-08 PROCEDURE — 1123F ACP DISCUSS/DSCN MKR DOCD: CPT | Performed by: INTERNAL MEDICINE

## 2021-04-08 PROCEDURE — G8420 CALC BMI NORM PARAMETERS: HCPCS | Performed by: INTERNAL MEDICINE

## 2021-04-08 PROCEDURE — 1090F PRES/ABSN URINE INCON ASSESS: CPT | Performed by: INTERNAL MEDICINE

## 2021-04-08 PROCEDURE — 4040F PNEUMOC VAC/ADMIN/RCVD: CPT | Performed by: INTERNAL MEDICINE

## 2021-04-08 SDOH — ECONOMIC STABILITY: FOOD INSECURITY: WITHIN THE PAST 12 MONTHS, THE FOOD YOU BOUGHT JUST DIDN'T LAST AND YOU DIDN'T HAVE MONEY TO GET MORE.: NEVER TRUE

## 2021-04-08 SDOH — ECONOMIC STABILITY: INCOME INSECURITY: HOW HARD IS IT FOR YOU TO PAY FOR THE VERY BASICS LIKE FOOD, HOUSING, MEDICAL CARE, AND HEATING?: NOT HARD AT ALL

## 2021-04-08 SDOH — ECONOMIC STABILITY: TRANSPORTATION INSECURITY
IN THE PAST 12 MONTHS, HAS LACK OF TRANSPORTATION KEPT YOU FROM MEETINGS, WORK, OR FROM GETTING THINGS NEEDED FOR DAILY LIVING?: NO

## 2021-04-08 SDOH — ECONOMIC STABILITY: TRANSPORTATION INSECURITY
IN THE PAST 12 MONTHS, HAS THE LACK OF TRANSPORTATION KEPT YOU FROM MEDICAL APPOINTMENTS OR FROM GETTING MEDICATIONS?: NO

## 2021-04-08 NOTE — PROGRESS NOTES
Berny Beltran   Date ofBirth:  1935    Date of Visit:  4/8/2021    Chief Complaint   Patient presents with    Results     MRI of brain on 3/30/21       HPI  [facility]. Patient states she was brought to the visit today by [employer] who is the Director of Activities. Patient states she had a MRI brain done on 3/10/21 and she is not sure why she had it. Patient brought a copy of the results and the indication states it was for intractable cluster headaches. Patient wants to know what her results of her MRI mean. Patient states she had a fall 2 times and started having more problems with memory. Patient states she started having headaches when she went to the Facility. Patient states headaches located front of head most of the time and occasionally back of head. Headaches are dull achy. Patient states she was getting them more frequently. Patient states when she stopped taking some of her medication over the past 9 months the headaches started getting better. Patient states she stopped all of her medications. Patient states she has been off blood pressure for quite some time. Patient states she is off cholesterol medication. Patient states her last headache was yesterday morning and lasted about 4 hours. Review of Systems   Constitutional: Negative for chills, fatigue and fever. HENT: Negative for congestion, postnasal drip, rhinorrhea, sinus pressure and sore throat. Eyes: Negative for visual disturbance. Respiratory: Negative for cough, chest tightness, shortness of breath and wheezing. Cardiovascular: Negative for chest pain, palpitations and leg swelling.    Gastrointestinal: Negative for Mild leftward nasal septal deviation. Aerated mastoid air cells. Vascular structures: Normal arterial and venous flow voids. Other: Normal included extracranial structures. Upper cervical spine without significant abnormality. IMPRESSION:     1. Mild nonspecific white matter disease likely chronic microvascular ischemia. 2.  Signal abnormality in the right centrum semiovale corona radiata potentially remote lacunar infarct, with no definite change since prior CT. 3.  No intracranial mass effect or hemorrhage. Report Verified by: Nguyen Leong MD at 3/10/2021 3:38 PM EST      Assessment/Plan     1. Nonintractable headache, unspecified chronicity pattern, unspecified headache type  - Tylenol (Acetaminophen) 650mg 3 times daily as needed    2. Cerebral microvascular disease  - MRI Head without contrast reviewed   - continue aspirin 81mg once daily  - may benefit from resuming cholesterol medication    3. Essential hypertension  - Patient is off blood pressure medication by choice  - blood pressure is ok today  - Low sodium diet  - Comprehensive Metabolic Panel; Future    4. Mixed hyperlipidemia  - patient is off cholesterol medication by choice  - Low fat, low cholesterol diet  - Regular aerobic exercise  - Lipid Panel; Future  - Comprehensive Metabolic Panel; Future           Return in about 4 weeks (around 5/6/2021) for headache, hypertension, and results, .

## 2021-04-08 NOTE — PATIENT INSTRUCTIONS
1. Nonintractable headache, unspecified chronicity pattern, unspecified headache type  - Tylenol (Acetaminophen) 650mg 3 times daily as needed    2. Cerebral microvascular disease  - MRI Head without contrast reviewed   - continue aspirin 81mg once daily  - may benefit from resuming cholesterol medication    3. Essential hypertension  - Patient is off blood pressure medication by choice  - blood pressure is ok today  - Low sodium diet  - Comprehensive Metabolic Panel; Future    4. Mixed hyperlipidemia  - patient is off cholesterol medication by choice  - Low fat, low cholesterol diet  - Regular aerobic exercise  - Lipid Panel; Future  - Comprehensive Metabolic Panel;  Future

## 2021-04-13 DIAGNOSIS — I67.89 CEREBRAL MICROVASCULAR DISEASE: ICD-10-CM

## 2021-04-13 DIAGNOSIS — E78.2 MIXED HYPERLIPIDEMIA: ICD-10-CM

## 2021-04-13 DIAGNOSIS — I10 ESSENTIAL HYPERTENSION: ICD-10-CM

## 2021-04-13 LAB
A/G RATIO: 2.2 (ref 1.1–2.2)
ALBUMIN SERPL-MCNC: 4.3 G/DL (ref 3.4–5)
ALP BLD-CCNC: 83 U/L (ref 40–129)
ALT SERPL-CCNC: 13 U/L (ref 10–40)
ANION GAP SERPL CALCULATED.3IONS-SCNC: 11 MMOL/L (ref 3–16)
AST SERPL-CCNC: 21 U/L (ref 15–37)
BILIRUB SERPL-MCNC: 0.5 MG/DL (ref 0–1)
BUN BLDV-MCNC: 7 MG/DL (ref 7–20)
CALCIUM SERPL-MCNC: 9.3 MG/DL (ref 8.3–10.6)
CHLORIDE BLD-SCNC: 106 MMOL/L (ref 99–110)
CHOLESTEROL, TOTAL: 224 MG/DL (ref 0–199)
CO2: 26 MMOL/L (ref 21–32)
CREAT SERPL-MCNC: 0.6 MG/DL (ref 0.6–1.2)
GFR AFRICAN AMERICAN: >60
GFR NON-AFRICAN AMERICAN: >60
GLOBULIN: 2 G/DL
GLUCOSE BLD-MCNC: 80 MG/DL (ref 70–99)
HDLC SERPL-MCNC: 101 MG/DL (ref 40–60)
LDL CHOLESTEROL CALCULATED: 110 MG/DL
POTASSIUM SERPL-SCNC: 3.9 MMOL/L (ref 3.5–5.1)
SODIUM BLD-SCNC: 143 MMOL/L (ref 136–145)
TOTAL PROTEIN: 6.3 G/DL (ref 6.4–8.2)
TRIGL SERPL-MCNC: 64 MG/DL (ref 0–150)
VLDLC SERPL CALC-MCNC: 13 MG/DL

## 2021-04-16 ASSESSMENT — ENCOUNTER SYMPTOMS
BLOOD IN STOOL: 0
WHEEZING: 0
VOMITING: 0
NAUSEA: 0
SHORTNESS OF BREATH: 0
DIARRHEA: 0
SORE THROAT: 0
SINUS PRESSURE: 0
RHINORRHEA: 0
CONSTIPATION: 0
CHEST TIGHTNESS: 0
COUGH: 0
ABDOMINAL PAIN: 0

## 2021-05-06 ENCOUNTER — TELEPHONE (OUTPATIENT)
Dept: PRIMARY CARE CLINIC | Age: 86
End: 2021-05-06

## 2021-05-06 ENCOUNTER — OFFICE VISIT (OUTPATIENT)
Dept: PRIMARY CARE CLINIC | Age: 86
End: 2021-05-06
Payer: MEDICARE

## 2021-05-06 VITALS
HEART RATE: 64 BPM | TEMPERATURE: 97.4 F | BODY MASS INDEX: 22.02 KG/M2 | DIASTOLIC BLOOD PRESSURE: 76 MMHG | SYSTOLIC BLOOD PRESSURE: 140 MMHG | RESPIRATION RATE: 14 BRPM | OXYGEN SATURATION: 98 % | HEIGHT: 64 IN | WEIGHT: 129 LBS

## 2021-05-06 DIAGNOSIS — I10 ESSENTIAL HYPERTENSION: Primary | ICD-10-CM

## 2021-05-06 DIAGNOSIS — R31.29 MICROSCOPIC HEMATURIA: ICD-10-CM

## 2021-05-06 DIAGNOSIS — M54.41 RIGHT-SIDED LOW BACK PAIN WITH RIGHT-SIDED SCIATICA, UNSPECIFIED CHRONICITY: ICD-10-CM

## 2021-05-06 DIAGNOSIS — R51.9 NONINTRACTABLE HEADACHE, UNSPECIFIED CHRONICITY PATTERN, UNSPECIFIED HEADACHE TYPE: ICD-10-CM

## 2021-05-06 LAB
BILIRUBIN, POC: NORMAL
BLOOD URINE, POC: NORMAL
CLARITY, POC: CLEAR
COLOR, POC: YELLOW
EPITHELIAL CELLS, UA: 0 /HPF (ref 0–5)
GLUCOSE URINE, POC: NORMAL
HYALINE CASTS: 0 /LPF (ref 0–8)
KETONES, POC: NORMAL
LEUKOCYTE EST, POC: NORMAL
NITRITE, POC: NORMAL
PH, POC: 7
PROTEIN, POC: NORMAL
RBC UA: 5 /HPF (ref 0–4)
SPECIFIC GRAVITY, POC: 1.02
URINE TYPE: ABNORMAL
UROBILINOGEN, POC: 0.2
WBC UA: 1 /HPF (ref 0–5)

## 2021-05-06 PROCEDURE — G8427 DOCREV CUR MEDS BY ELIG CLIN: HCPCS | Performed by: INTERNAL MEDICINE

## 2021-05-06 PROCEDURE — G8420 CALC BMI NORM PARAMETERS: HCPCS | Performed by: INTERNAL MEDICINE

## 2021-05-06 PROCEDURE — 99214 OFFICE O/P EST MOD 30 MIN: CPT | Performed by: INTERNAL MEDICINE

## 2021-05-06 PROCEDURE — G8400 PT W/DXA NO RESULTS DOC: HCPCS | Performed by: INTERNAL MEDICINE

## 2021-05-06 PROCEDURE — 4040F PNEUMOC VAC/ADMIN/RCVD: CPT | Performed by: INTERNAL MEDICINE

## 2021-05-06 PROCEDURE — 1123F ACP DISCUSS/DSCN MKR DOCD: CPT | Performed by: INTERNAL MEDICINE

## 2021-05-06 PROCEDURE — 1090F PRES/ABSN URINE INCON ASSESS: CPT | Performed by: INTERNAL MEDICINE

## 2021-05-06 PROCEDURE — 81002 URINALYSIS NONAUTO W/O SCOPE: CPT | Performed by: INTERNAL MEDICINE

## 2021-05-06 PROCEDURE — 1036F TOBACCO NON-USER: CPT | Performed by: INTERNAL MEDICINE

## 2021-05-06 RX ORDER — LIDOCAINE 50 MG/G
1 PATCH TOPICAL DAILY
Qty: 30 PATCH | Refills: 1 | Status: SHIPPED | OUTPATIENT
Start: 2021-05-06 | End: 2021-07-12

## 2021-05-06 NOTE — PROGRESS NOTES
Ruthie Swan   Date ofBirth:  1935    Date of Visit:  5/6/2021    Chief Complaint   Patient presents with    Hypertension     BP check, review labs    Headache    Back Pain       HPI  Patient has hypertension. Patient states her blood pressure has been 123 or 153 on the top number and she is not sure of the bottom number. Patient decreases salt but states one of her meals came with salt on top of her eggs. Patient states she exercises daily for 15 minutes doing group exercise 5 days per week. Patient states 2 weeks ago she drank water and felt like a board slammed in her back. Patient states it hurt for a while and the next day her back was sore but she still did her exercises. Patient states her back still hurts. Patient states the pain starts at her right side waist and radiates down her right thigh and knee. Patient states if she bends over she gets a sharp pain in her right thigh. Patient states her right back is always sore. Patient complaints of tingling in her right leg. Patient states when she goes to do something with her left leg her knee hurts. Patient states she thought she needed an aide to help her put on her shoes and stockings. Patient states she tries to drink a lot of water. Patient denies urinary symptoms. Patient states her headaches are better and she is not having them right now. Patient brought to visit by [employer] and  at [address] include 234 beds at the [facility] but [facility] is in the waiting area. .    Review of Systems   Constitutional: Negative for chills, fatigue and fever. HENT: Negative for congestion, postnasal drip, rhinorrhea, sinus pressure and sore throat. Eyes: Negative for visual disturbance. Respiratory: Negative for cough, chest tightness, shortness of breath and wheezing. Cardiovascular: Negative for chest pain, palpitations and leg swelling.    Gastrointestinal: Negative for abdominal pain, blood in stool, constipation, diarrhea, nausea and vomiting. Genitourinary: Negative for dysuria, frequency and hematuria. Musculoskeletal: Positive for arthralgias, back pain and myalgias. Skin: Negative for rash. Neurological: Positive for numbness. Negative for dizziness, tremors, syncope, weakness, light-headedness and headaches. Psychiatric/Behavioral: Negative for dysphoric mood and sleep disturbance. The patient is not nervous/anxious. No Known Allergies  Outpatient Medications Marked as Taking for the 5/6/21 encounter (Office Visit) with Jose Smith MD   Medication Sig Dispense Refill    Multiple Vitamins-Minerals (THERAPEUTIC MULTIVITAMIN-MINERALS) tablet Take 1 tablet by mouth daily      docusate sodium (COLACE) 100 MG capsule Take 1 capsule by mouth 2 times daily 60 capsule 1    levothyroxine (SYNTHROID) 75 MCG tablet Take 1 tablet by mouth Daily 90 tablet 1    aspirin (ASPIRIN 81) 81 MG EC tablet Take 1 tablet by mouth daily      vitamin D (CHOLECALCIFEROL) 1000 UNIT TABS tablet Take 1,000 Units by mouth daily           Vitals:    05/06/21 0953 05/06/21 1000   BP: (!) 160/78 (!) 140/76   Site: Right Upper Arm Left Upper Arm   Pulse: 64    Resp: 14    Temp: 97.4 °F (36.3 °C)    SpO2: 98%    Weight: 129 lb (58.5 kg)    Height: 5' 4\" (1.626 m)      Body mass index is 22.14 kg/m². Physical Exam  Nursing note reviewed. Constitutional:       General: She is not in acute distress. Appearance: Normal appearance. She is well-developed. HENT:      Right Ear: Tympanic membrane and ear canal normal.      Left Ear: Tympanic membrane and ear canal normal.   Eyes:      General: Lids are normal.      Extraocular Movements: Extraocular movements intact. Conjunctiva/sclera: Conjunctivae normal.      Pupils: Pupils are equal, round, and reactive to light. Neck:      Musculoskeletal: Neck supple. Thyroid: No thyromegaly. Vascular: No carotid bruit.    Cardiovascular:      Rate and Rhythm: Normal rate and regular rhythm. Heart sounds: Normal heart sounds, S1 normal and S2 normal. No murmur. No friction rub. No gallop. Pulmonary:      Effort: Pulmonary effort is normal. No respiratory distress. Breath sounds: Normal breath sounds. No wheezing, rhonchi or rales. Abdominal:      General: Bowel sounds are normal. There is no distension. Palpations: Abdomen is soft. Tenderness: There is no abdominal tenderness. Musculoskeletal:      Lumbar back: She exhibits tenderness and pain (back pain with bilateral SLR's to 45 degrees). She exhibits normal range of motion and no spasm. Right lower leg: No edema. Left lower leg: No edema. Lymphadenopathy:      Head:      Right side of head: No submandibular adenopathy. Left side of head: No submandibular adenopathy. Neurological:      Mental Status: She is alert and oriented to person, place, and time. Cranial Nerves: No cranial nerve deficit. Gait: Gait normal.      Deep Tendon Reflexes: Reflexes are normal and symmetric.    Psychiatric:         Mood and Affect: Mood normal.           Results for POC orders placed in visit on 05/06/21   POCT Urinalysis no Micro   Result Value Ref Range    Color, UA yellow     Clarity, UA clear     Glucose, UA POC neg     Bilirubin, UA neg     Ketones, UA neg     Spec Grav, UA 1.025     Blood, UA POC trace- intact     pH, UA 7.0     Protein, UA POC neg     Urobilinogen, UA 0.2     Leukocytes, UA neg     Nitrite, UA neg      Lab Review   Orders Only on 04/13/2021   Component Date Value    Sodium 04/13/2021 143     Potassium 04/13/2021 3.9     Chloride 04/13/2021 106     CO2 04/13/2021 26     Anion Gap 04/13/2021 11     Glucose 04/13/2021 80     BUN 04/13/2021 7     CREATININE 04/13/2021 0.6     GFR Non- 04/13/2021 >60     GFR  04/13/2021 >60     Calcium 04/13/2021 9.3     Total Protein 04/13/2021 6.3*    Albumin 04/13/2021 4.3  Albumin/Globulin Ratio 04/13/2021 2.2     Total Bilirubin 04/13/2021 0.5     Alkaline Phosphatase 04/13/2021 83     ALT 04/13/2021 13     AST 04/13/2021 21     Globulin 04/13/2021 2.0     Cholesterol, Total 04/13/2021 224*    Triglycerides 04/13/2021 64     HDL 04/13/2021 101*    LDL Calculated 04/13/2021 110*    VLDL Cholesterol Calcula* 04/13/2021 13          Assessment/Plan     1. Essential hypertension  -blood pressure is elevated  -patient has Lisinopril but is not sure of the dose   -may need to resume Lisinopril but I will need to check the dose first. I will contact patient's facility [address] living for dose of Lisinopril  -Low sodium diet  -Regular aerobic exercise    2. Nonintractable headache, unspecified chronicity pattern, unspecified headache type  -better  - Tylenol 650mg 3 times daily as needed    3. Right-sided low back pain with right-sided sciatica, unspecified chronicity  - lidocaine (LIDODERM) 5 %; Place 1 patch onto the skin daily 12 hours on, 12 hours off. Dispense: 30 patch; Refill: 1  -Tylenol (Acetaminophen) 500mg 3-4  times daily as needed  -[facility]  [facility], [facility]amm Pierre [facility]      Discussed medications with patient, who voiced understanding of their use and indications. All questions answered. Return in about 4 weeks (around 6/3/2021) for Medicare AW.

## 2021-05-06 NOTE — TELEPHONE ENCOUNTER
Per nurse at [facility] assisted living patient was prescribed 40 mg of lisinopril daily. Patient has been refusing the medication.

## 2021-05-06 NOTE — PATIENT INSTRUCTIONS
1. Essential hypertension  -blood pressure is elevated  -patient has Lisinopril but is not sure of the dose   -may need to resume Lisinopril but I will need to check the dose first. I will contact patient's facility [address] [facility] [address] living for dose of Lisinopril  -Low sodium diet  -Regular aerobic exercise    2. Nonintractable headache, unspecified chronicity pattern, unspecified headache type  -better  - Tylenol 650mg 3 times daily as needed    3. Right-sided low back pain with right-sided sciatica, unspecified chronicity  - lidocaine (LIDODERM) 5 %; Place 1 patch onto the skin daily 12 hours on, 12 hours off. Dispense: 30 patch;  Refill: 1  -Tylenol (Acetaminophen) 500mg 3-4  times daily as needed  -Referral to  [facility], Casandra Beltrán PA-C, Orthopedic Surgery, [facility]

## 2021-05-08 LAB
ORGANISM: ABNORMAL
URINE CULTURE, ROUTINE: ABNORMAL

## 2021-05-09 DIAGNOSIS — N39.0 URINARY TRACT INFECTION WITHOUT HEMATURIA, SITE UNSPECIFIED: Primary | ICD-10-CM

## 2021-05-09 RX ORDER — CIPROFLOXACIN 250 MG/1
250 TABLET, FILM COATED ORAL 2 TIMES DAILY
Qty: 14 TABLET | Refills: 0 | Status: SHIPPED | OUTPATIENT
Start: 2021-05-09 | End: 2021-05-16

## 2021-05-10 ENCOUNTER — TELEPHONE (OUTPATIENT)
Dept: PRIMARY CARE CLINIC | Age: 86
End: 2021-05-10

## 2021-05-10 NOTE — TELEPHONE ENCOUNTER
----- Message from Letty Cerda sent at 5/10/2021 10:57 AM EDT -----  Subject: Message to Provider    QUESTIONS  Information for Provider? Elliott Dickens called in today to change pharmacy   for patient back to [facility] through [facility] assisted Living mail   order from local pharmacy please Give son a call at [phone]  ---------------------------------------------------------------------------  --------------  CALL BACK INFO  What is the best way for the office to contact you? OK to leave message on   voicemail  Preferred Call Back Phone Number? [phone]  ---------------------------------------------------------------------------  --------------  SCRIPT ANSWERS  Relationship to Patient? Third Party  Representative Name?  Avelina Waldrop patient elliott and ZAHRA

## 2021-05-13 PROBLEM — R51.9 NONINTRACTABLE HEADACHE: Status: ACTIVE | Noted: 2021-05-13

## 2021-05-13 PROBLEM — M54.41 RIGHT-SIDED LOW BACK PAIN WITH RIGHT-SIDED SCIATICA: Status: ACTIVE | Noted: 2021-05-13

## 2021-05-13 ASSESSMENT — ENCOUNTER SYMPTOMS
CHEST TIGHTNESS: 0
WHEEZING: 0
COUGH: 0
BACK PAIN: 1
NAUSEA: 0
VOMITING: 0
DIARRHEA: 0
BLOOD IN STOOL: 0
SHORTNESS OF BREATH: 0
RHINORRHEA: 0
SORE THROAT: 0
CONSTIPATION: 0
SINUS PRESSURE: 0
ABDOMINAL PAIN: 0

## 2021-05-25 ENCOUNTER — OFFICE VISIT (OUTPATIENT)
Dept: ORTHOPEDIC SURGERY | Age: 86
End: 2021-05-25
Payer: MEDICARE

## 2021-05-25 VITALS — WEIGHT: 129 LBS | BODY MASS INDEX: 22.02 KG/M2 | HEIGHT: 64 IN

## 2021-05-25 DIAGNOSIS — M54.16 LUMBAR RADICULITIS: ICD-10-CM

## 2021-05-25 DIAGNOSIS — M51.36 DDD (DEGENERATIVE DISC DISEASE), LUMBAR: Primary | ICD-10-CM

## 2021-05-25 PROCEDURE — 99203 OFFICE O/P NEW LOW 30 MIN: CPT | Performed by: PHYSICIAN ASSISTANT

## 2021-05-25 PROCEDURE — 4040F PNEUMOC VAC/ADMIN/RCVD: CPT | Performed by: PHYSICIAN ASSISTANT

## 2021-05-25 PROCEDURE — 1123F ACP DISCUSS/DSCN MKR DOCD: CPT | Performed by: PHYSICIAN ASSISTANT

## 2021-05-25 PROCEDURE — G8400 PT W/DXA NO RESULTS DOC: HCPCS | Performed by: PHYSICIAN ASSISTANT

## 2021-05-25 PROCEDURE — G8420 CALC BMI NORM PARAMETERS: HCPCS | Performed by: PHYSICIAN ASSISTANT

## 2021-05-25 PROCEDURE — 1036F TOBACCO NON-USER: CPT | Performed by: PHYSICIAN ASSISTANT

## 2021-05-25 PROCEDURE — 1090F PRES/ABSN URINE INCON ASSESS: CPT | Performed by: PHYSICIAN ASSISTANT

## 2021-05-25 PROCEDURE — G8427 DOCREV CUR MEDS BY ELIG CLIN: HCPCS | Performed by: PHYSICIAN ASSISTANT

## 2021-05-25 RX ORDER — METHYLPREDNISOLONE 4 MG/1
TABLET ORAL
Qty: 1 KIT | Refills: 0 | Status: SHIPPED | OUTPATIENT
Start: 2021-05-25 | End: 2021-05-27 | Stop reason: SDUPTHER

## 2021-05-25 NOTE — PROGRESS NOTES
New Patient: SPINE    5/25/2021     CHIEF COMPLAINT:  Low back and leg pain    HISTORY OF PRESENT ILLNESS:              The patient is a 82 y.o. female history of CAD, breast cancer, hypertension, osteoporosis, neuropathy referred by Augusto Mcneil MD for low back and right leg pain. She reports a 2-month history of atraumatic stabbing, intermittent right low back/buttock pain radiating in the lateral thigh to the knee. Her symptoms are increased with leaning forward or prolonged walking. She reports relief with sitting, or resting. Conservative care includes Tylenol, Lidoderm patches. She has chronic urinary issues and was recently treated for a UTI with some improvement. She currently denies moses loss of bowel or bladder control or saddle anesthesia. She denies any recent injury or trauma. She does report some weight loss over the last year (approx 15lbs) PCP is aware. She denies any recent fevers chills or night sweats.      Current/Past Treatment:   · Physical Therapy:   · Chiropractic:     · Injection:     Medications:            NSAIDS: ASA            Muscle relaxer:              Steriods:              Neuropathic medications:              Opioids:            Other: Lidoderm, TYL  · Surgery/Consult:    Work Status/Functionality: Retired    Past Medical History: Medical history form was reviewed today & scanned into the media tab  Past Medical History:   Diagnosis Date    CAD (coronary artery disease)     Cancer ([address]     breast    Cerebral artery occlusion with cerebral infarction ([address]     Chronic mid back pain     Hearing deficit     History of alcohol abuse     IN THE PAST    History of breast cancer     Hyperlipidemia     Hypertension     Macular degeneration     Medial meniscus tear     Mixed hyperlipidemia 9/17/2019    Osteoarthritis of knee     Osteoporosis     Peripheral neuropathy     Postsurgical hypothyroidism     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    Retinal dystrophy  Tremor       Past Surgical History:     Past Surgical History:   Procedure Laterality Date    BREAST LUMPECTOMY      bilateral    HYSTERECTOMY      KNEE CARTILAGE SURGERY      SKIN BIOPSY Right 12/24/2019    EXCISION OF SOFT TISSUE MASS RIGHT BACK AND EXCISION OF SKIN LESION RIGHT BACK performed by Ben Salcido MD at [facility]       Current Medications:     Current Outpatient Medications:     lidocaine (LIDODERM) 5 %, Place 1 patch onto the skin daily 12 hours on, 12 hours off., Disp: 30 patch, Rfl: 1    Multiple Vitamins-Minerals (THERAPEUTIC MULTIVITAMIN-MINERALS) tablet, Take 1 tablet by mouth daily, Disp: , Rfl:     levothyroxine (SYNTHROID) 75 MCG tablet, Take 1 tablet by mouth Daily, Disp: 90 tablet, Rfl: 1    aspirin (ASPIRIN 81) 81 MG EC tablet, Take 1 tablet by mouth daily, Disp: , Rfl:     vitamin D (CHOLECALCIFEROL) 1000 UNIT TABS tablet, Take 1,000 Units by mouth daily, Disp: , Rfl:   Allergies:  Patient has no known allergies. Social History:    reports that she quit smoking about 40 years ago. Her smoking use included cigarettes. She has a 7.50 pack-year smoking history. She has never used smokeless tobacco. She reports current alcohol use. She reports that she does not use drugs.   Family History:   Family History   Problem Relation Age of Onset    Heart Disease Mother     Cancer Father        REVIEW OF SYSTEMS: Full ROS reviewed & scanned into chart  CONSTITUTIONAL: admits unexplained weight loss x1yr   SKIN: Denies skin conditions, skin cancer  ENT: admits Headaches, dizziness  RESPIRATORY: Denies current dyspnea, difficulty breathing  CARDIOVASCULAR: Denies chest pain, dyspnea  NEUROLOGICAL: Denies stroke, unsteady gait or progressive weakness  PSYCHOLOGICAL: Denies anxiety, depression   HEMATOLOGIC: Denies blood disorders, cancer  ENDOCRINE: Denies excessive thirst, urination, heat/cold  GI: Denies ulcer, nausea, vomiting, diarrhea : admits chronic bladder incontinence       PHYSICAL EXAM:    Vitals:    Weight  129 lb (58.5 kg)      Height  5' 4\" (1.626 m)     Pain Score    8     Pain Loc  Back        GENERAL EXAM:  · General Apparence: Patient is adequately groomed with no evidence of malnutrition. · Orientation: The patient is oriented to time, place and person. · Mood & Affect:The patient's mood and affect are appropriate   · Vascular: Examination reveals no swelling tenderness in upper or lower extremities   · Lymphatic: The lymphatic examination bilaterally reveals all areas to be without enlargement or induration  · Sensation: Sensation is intact without deficit    LUMBAR/SACRAL EXAMINATION:  · Inspection: Local inspection shows no step-off or bruising. Mild kyphosis. · Palpation:   No evidence of tenderness at the midline. No tenderness bilaterally at the paraspinal or trochanters. There is no step-off or paraspinal spasm. · Range of Motion: Moderate loss of flexion and extension  · Strength:   Strength testing is 5/5 in all muscle groups tested. · Special Tests:   Straight leg raise and crossed SLR negative. Leg length and pelvis level.  0 out of 5 Manisha's signs. · Skin: There are no rashes, ulcerations or lesions. · Reflexes: Reflexes are symmetrically 1-2+ at the patellar and ankle tendons. Clonus absent bilaterally at the feet. · Gait & station: Slightly forward flexed unassisted  · Additional Examinations:   · RIGHT LOWER EXTREMITY: Inspection/examination of the right lower extremity does not show any tenderness, deformity or injury. Range of motion is full. There is no gross instability. There are no rashes, ulcerations or lesions. Strength and tone are normal.  ·   · LEFT LOWER EXTREMITY:  Inspection/examination of the left lower extremity does not show any tenderness, deformity or injury. Range of motion is full. There is no gross instability. There are no rashes, ulcerations or lesions. Strength and tone are normal.    Diagnostic Testin views lumbar spine 2021 shows S1 lumbarization, mild L5-S1 DDD, multilevel facet arthropathy, right SI sclerosis        Impression:  1) 2mo LBP, right lumbar radiculitis  2) S1 lumbarization, L5-S1 DDD  3) UTI s/p abx  4) H/o breast cancer       Plan:   1) Lumbar MRI WO--assess to sacrum   2) MDP--side effects discussed  3) PT for above  4) F/u to review L MRI t/c ANASTASIA if warranted       Electronically signed by Milla Cornelius PA-C, MPAS on 2021 at 10:49 AM     Milla Cornelius PA-C, MARLIN  Board Certified by the NCCPA

## 2021-05-26 ENCOUNTER — TELEPHONE (OUTPATIENT)
Dept: ORTHOPEDIC SURGERY | Age: 86
End: 2021-05-26

## 2021-05-26 NOTE — TELEPHONE ENCOUNTER
Called & spoke with the patient informing her that her MRI is approved & she can go ahead and schedule and she was told to schedule her f/u appointment after the MRI.

## 2021-05-27 ENCOUNTER — TELEPHONE (OUTPATIENT)
Dept: ORTHOPEDIC SURGERY | Age: 86
End: 2021-05-27

## 2021-05-27 DIAGNOSIS — M51.36 DDD (DEGENERATIVE DISC DISEASE), LUMBAR: ICD-10-CM

## 2021-05-27 DIAGNOSIS — M54.16 LUMBAR RADICULITIS: ICD-10-CM

## 2021-05-27 RX ORDER — METHYLPREDNISOLONE 4 MG/1
TABLET ORAL
Qty: 1 KIT | Refills: 0 | Status: SHIPPED | OUTPATIENT
Start: 2021-05-27 | End: 2021-07-29 | Stop reason: ALTCHOICE

## 2021-05-27 ASSESSMENT — ENCOUNTER SYMPTOMS
BACK PAIN: 0
COLOR CHANGE: 0
BLOOD IN STOOL: 0
NAUSEA: 0
PHOTOPHOBIA: 0
EYE PAIN: 0
STRIDOR: 0
DIARRHEA: 0
ABDOMINAL PAIN: 0
VOMITING: 0
CONSTIPATION: 0
TROUBLE SWALLOWING: 0
WHEEZING: 0
CHEST TIGHTNESS: 0
ABDOMINAL DISTENTION: 0
APNEA: 0

## 2021-05-27 NOTE — PROGRESS NOTES
[facility]     Outpatient Cardiology         Chief Complaint   Patient presents with    Hypertension    Congestive Heart Failure       HPI     Jose Walsh a 82 y.o. female here for follow up for HTN, HLD, CAD. Hypertension, decently controlled, off medications, she did not like the side effects of ACE inhibitor's or beta-blockers. Hyperlipidemia, not on a statin, did not like taking statin had some myalgias. Chronic systolic heart failure, nonischemic in nature, does not like taking heart failure medications due to side effects. Having said that she is overall well compensated, no edema, no PND, no orthopnea. Nonobstructive coronary disease, on aspirin, no chest pain or tightness. Hx  She underwent a cardiac cath in 2014 that revealed minimal CAD with normal LVEDP. She also had a nuclear stress test in 2016 that was within normal limits.       PMH  Past Medical History:   Diagnosis Date    CAD (coronary artery disease)     Cancer ([facility] [address]     breast    Cerebral artery occlusion with cerebral infarction ([facility] [address]     Chronic mid back pain     Hearing deficit     History of alcohol abuse     IN THE PAST    History of breast cancer     Hyperlipidemia     Hypertension     Macular degeneration     Medial meniscus tear     Mixed hyperlipidemia 9/17/2019    Osteoarthritis of knee     Osteoporosis     Peripheral neuropathy     Postsurgical hypothyroidism     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    Retinal dystrophy     Tremor        PSH  Past Surgical History:   Procedure Laterality Date    BREAST LUMPECTOMY      bilateral    HYSTERECTOMY      KNEE CARTILAGE SURGERY      SKIN BIOPSY Right 12/24/2019    EXCISION OF SOFT TISSUE MASS RIGHT BACK AND EXCISION OF SKIN LESION RIGHT BACK performed by Gail Frank MD at [facility] TOTAL KNEE ARTHROPLASTY          Social HIstory  Social History     Tobacco Use    Smoking status: Former Smoker     Packs/day: neck pain. Skin: Negative for color change and pallor. Neurological: Negative for dizziness, syncope, facial asymmetry, speech difficulty, weakness, light-headedness, numbness and headaches. Psychiatric/Behavioral: Negative for agitation, behavioral problems and confusion. Vitals:    06/01/21 1316   BP: 130/80   Pulse: 68    Weight: 131 lb 6.4 oz (59.6 kg)       Vitals:    06/01/21 1316   BP: 130/80   Site: Left Upper Arm   Pulse: 68   Weight: 131 lb 6.4 oz (59.6 kg)   Height: 5' 4\" (1.626 m)       BP Readings from Last 3 Encounters:   06/01/21 130/80   05/06/21 (!) 140/76   04/08/21 138/68       Wt Readings from Last 3 Encounters:   06/01/21 131 lb 6.4 oz (59.6 kg)   05/25/21 129 lb (58.5 kg)   05/06/21 129 lb (58.5 kg)       Physical Exam  Constitutional:       General: She is not in acute distress. Appearance: She is well-developed. She is not diaphoretic. HENT:      Head: Normocephalic and atraumatic. Nose: Nose normal.      Mouth/Throat:      Pharynx: No oropharyngeal exudate. Eyes:      General:         Right eye: No discharge. Left eye: No discharge. Conjunctiva/sclera: Conjunctivae normal.      Pupils: Pupils are equal, round, and reactive to light. Neck:      Vascular: No JVD. Trachea: No tracheal deviation. Cardiovascular:      Rate and Rhythm: Normal rate and regular rhythm. Heart sounds: Normal heart sounds. No murmur heard. No friction rub. No gallop. Pulmonary:      Effort: Pulmonary effort is normal. No respiratory distress. Breath sounds: Normal breath sounds. No stridor. No wheezing or rales. Chest:      Chest wall: No tenderness. Abdominal:      General: Bowel sounds are normal. There is no distension. Palpations: Abdomen is soft. Tenderness: There is no abdominal tenderness. There is no guarding or rebound. Musculoskeletal:         General: No tenderness or deformity. Normal range of motion.       Cervical back: Normal perfusion imaging following stress reveals uniform uptake of tracer throughout the left ventricle with artifact. There is a mild defect in the inferior region likely due to diaphragmatic attenuation. Rest images are similar. Prone imaging reveals resolution of the defect in the inferior region. Left ventricular cavity size is normal. The overall left ventricular systolic function is normal. The calculated left ventricular ejection fraction is 70 %. No regional wall thickening or wall motion abnormalities are present at rest. Transient left ventricular cavity dilation at stress is not present. SUMMARY:   1. Myocardial perfusion imaging is normal with artifact. 2. There is no scintigraphic evidence of myocardial ischemia. 3. There is no scintigraphic evidence of prior myocardial infarction. 4. There is no definite ECG evidence of ischemia. 5. Diaphragmatic attenuation artifact accounts for the defect in the inferior region. 6. The overall left ventricular systolic function is normal. The calculated left ventricular ejection fraction is 70 %. 7. Compared to the prior nuclear perfusion study from 8/31/2016, the findings are similar. 8/31/16  IMPRESSION  Myocardial perfusion imaging is within normal limits. The decreased tracer uptake in the apical region and the inferior and anterior areas of the apex is likely due to breast attenuation. There is borderline electrocardiographic but no clear   scintigraphic or clinical evidence of myocardial ischemia. There is no scintigraphic or electrocardiographic evidence of prior myocardial infarction. In addition, there is no scintigraphic evidence of left ventricular dysfunction. Compared to the prior   perfusion study of 11/18/2014, ischemia is no longer seen. Last Cath (if available):    Last TTE/NEVIN(if available): 3/30/20  Summary   Normal left ventricular size. Mild concentric left ventricular hypertrophy.    Mildly decreased left ventricular systolic function with an estimated   ejection fraction of 45%-50%. Mild global hypokinesis. Grade I diastolic dysfunction with normal LV filling pressures. The left atrium is mildly dilated. Estimated pulmonary artery systolic pressure is at 25 mmHg assuming a right   atrial pressure of 3 mmHg. A bubble study was performed and fails to show evidence of shunting. Last CMR  (if available):      Assessment / Plan:     Chronic systolic congestive heart failure (HCC)  Of heart failure medications, did not like side effects. Compensated, asymptomatic. We will reassess her LV function given that it was borderline normal last year. If her EF remains stable or better, reasonable to keep her off CHF meds      CAD (coronary artery disease)  Nonobstructive, did not like taking a statin. Baby aspirin. Essential hypertension  Controlled off medications. Had side effects with beta-blockers and ACE inhibitor. Did not like how she was feeling when she was taking them. Continue to monitor. Mixed hyperlipidemia  Off statin due to side effects. Follow up in 6 months. I had the opportunity to review the clinical symptoms and presentation of Shakeel Sprain. Patient's allergies and medications were reviewed and updated. Patient's past medical, surgical, social and family history were reviewed and updated. Patient's testing including laboratory, ECGs, monitor, imaging (TTE,NEVIN,CMR,cath) were reviewed. Tobacco use was discussed with the patient and educated on the negative effects. I have asked the patient to not utilize these agents. All questions and concerns were addressed to the patient/family. Alternatives to my treatment were discussed. The note was completed using EMR. Every effort wasmade to ensure accuracy; however, inadvertent computerized transcription errors may be present. Thank you for allowing me to participate in thecare or Denisha Felix MD, [facility], [facility]

## 2021-05-27 NOTE — TELEPHONE ENCOUNTER
Call and left voicemail for the patient informing her that I am trying to get her medication sent through, but it is not working.  I informed her to call me back with any further questions at [phone]

## 2021-06-01 ENCOUNTER — OFFICE VISIT (OUTPATIENT)
Dept: CARDIOLOGY CLINIC | Age: 86
End: 2021-06-01
Payer: MEDICARE

## 2021-06-01 ENCOUNTER — TELEPHONE (OUTPATIENT)
Dept: ORTHOPEDIC SURGERY | Age: 86
End: 2021-06-01

## 2021-06-01 VITALS
HEART RATE: 68 BPM | WEIGHT: 131.4 LBS | BODY MASS INDEX: 22.43 KG/M2 | SYSTOLIC BLOOD PRESSURE: 130 MMHG | DIASTOLIC BLOOD PRESSURE: 80 MMHG | HEIGHT: 64 IN

## 2021-06-01 DIAGNOSIS — E78.2 MIXED HYPERLIPIDEMIA: Chronic | ICD-10-CM

## 2021-06-01 DIAGNOSIS — I50.22 CHRONIC SYSTOLIC CONGESTIVE HEART FAILURE (HCC): ICD-10-CM

## 2021-06-01 DIAGNOSIS — I25.119 CORONARY ARTERY DISEASE INVOLVING NATIVE CORONARY ARTERY OF NATIVE HEART WITH ANGINA PECTORIS (HCC): Primary | ICD-10-CM

## 2021-06-01 DIAGNOSIS — I10 ESSENTIAL HYPERTENSION: ICD-10-CM

## 2021-06-01 PROCEDURE — 93000 ELECTROCARDIOGRAM COMPLETE: CPT | Performed by: INTERNAL MEDICINE

## 2021-06-01 PROCEDURE — G8420 CALC BMI NORM PARAMETERS: HCPCS | Performed by: INTERNAL MEDICINE

## 2021-06-01 PROCEDURE — 99214 OFFICE O/P EST MOD 30 MIN: CPT | Performed by: INTERNAL MEDICINE

## 2021-06-01 PROCEDURE — 4040F PNEUMOC VAC/ADMIN/RCVD: CPT | Performed by: INTERNAL MEDICINE

## 2021-06-01 PROCEDURE — 1090F PRES/ABSN URINE INCON ASSESS: CPT | Performed by: INTERNAL MEDICINE

## 2021-06-01 PROCEDURE — 1123F ACP DISCUSS/DSCN MKR DOCD: CPT | Performed by: INTERNAL MEDICINE

## 2021-06-01 PROCEDURE — G8427 DOCREV CUR MEDS BY ELIG CLIN: HCPCS | Performed by: INTERNAL MEDICINE

## 2021-06-01 PROCEDURE — 1036F TOBACCO NON-USER: CPT | Performed by: INTERNAL MEDICINE

## 2021-06-01 PROCEDURE — G8400 PT W/DXA NO RESULTS DOC: HCPCS | Performed by: INTERNAL MEDICINE

## 2021-06-01 NOTE — TELEPHONE ENCOUNTER
Called & spoke with the patient informing er that we got her medication taken care of and it will be filled at the Pharmacy. I also got her scheduled with the f/u appointment on 6/15/2021 at 10:15am at the [facility].

## 2021-06-03 ENCOUNTER — OFFICE VISIT (OUTPATIENT)
Dept: PRIMARY CARE CLINIC | Age: 86
End: 2021-06-03
Payer: MEDICARE

## 2021-06-03 VITALS
RESPIRATION RATE: 20 BRPM | TEMPERATURE: 97.1 F | BODY MASS INDEX: 22.2 KG/M2 | HEIGHT: 64 IN | OXYGEN SATURATION: 97 % | WEIGHT: 130 LBS | DIASTOLIC BLOOD PRESSURE: 80 MMHG | SYSTOLIC BLOOD PRESSURE: 130 MMHG | HEART RATE: 78 BPM

## 2021-06-03 DIAGNOSIS — E89.0 POSTSURGICAL HYPOTHYROIDISM: ICD-10-CM

## 2021-06-03 DIAGNOSIS — Z23 NEED FOR TDAP VACCINATION: ICD-10-CM

## 2021-06-03 DIAGNOSIS — Z00.00 ROUTINE GENERAL MEDICAL EXAMINATION AT A HEALTH CARE FACILITY: Primary | ICD-10-CM

## 2021-06-03 DIAGNOSIS — K59.00 CONSTIPATION, UNSPECIFIED CONSTIPATION TYPE: ICD-10-CM

## 2021-06-03 DIAGNOSIS — Z23 NEED FOR SHINGLES VACCINE: ICD-10-CM

## 2021-06-03 DIAGNOSIS — I10 ESSENTIAL HYPERTENSION: ICD-10-CM

## 2021-06-03 LAB — TSH SERPL DL<=0.05 MIU/L-ACNC: 3.74 UIU/ML (ref 0.27–4.2)

## 2021-06-03 PROCEDURE — 4040F PNEUMOC VAC/ADMIN/RCVD: CPT | Performed by: INTERNAL MEDICINE

## 2021-06-03 PROCEDURE — 1123F ACP DISCUSS/DSCN MKR DOCD: CPT | Performed by: INTERNAL MEDICINE

## 2021-06-03 PROCEDURE — G0438 PPPS, INITIAL VISIT: HCPCS | Performed by: INTERNAL MEDICINE

## 2021-06-03 RX ORDER — DOCUSATE SODIUM 100 MG/1
100 CAPSULE, LIQUID FILLED ORAL 2 TIMES DAILY
COMMUNITY
Start: 2021-06-03

## 2021-06-03 RX ORDER — ZOSTER VACCINE RECOMBINANT, ADJUVANTED 50 MCG/0.5
0.5 KIT INTRAMUSCULAR SEE ADMIN INSTRUCTIONS
Qty: 0.5 ML | Refills: 1 | Status: SHIPPED | OUTPATIENT
Start: 2021-06-03 | End: 2021-11-30

## 2021-06-03 ASSESSMENT — LIFESTYLE VARIABLES
HAS A RELATIVE, FRIEND, DOCTOR, OR ANOTHER HEALTH PROFESSIONAL EXPRESSED CONCERN ABOUT YOUR DRINKING OR SUGGESTED YOU CUT DOWN: 0
HOW OFTEN DURING THE LAST YEAR HAVE YOU NEEDED AN ALCOHOLIC DRINK FIRST THING IN THE MORNING TO GET YOURSELF GOING AFTER A NIGHT OF HEAVY DRINKING: 0
HOW OFTEN DURING THE LAST YEAR HAVE YOU BEEN UNABLE TO REMEMBER WHAT HAPPENED THE NIGHT BEFORE BECAUSE YOU HAD BEEN DRINKING: 0
HOW OFTEN DURING THE LAST YEAR HAVE YOU FAILED TO DO WHAT WAS NORMALLY EXPECTED FROM YOU BECAUSE OF DRINKING: 0
HAVE YOU OR SOMEONE ELSE BEEN INJURED AS A RESULT OF YOUR DRINKING: 0
HOW OFTEN DO YOU HAVE A DRINK CONTAINING ALCOHOL: 1
HOW OFTEN DURING THE LAST YEAR HAVE YOU FOUND THAT YOU WERE NOT ABLE TO STOP DRINKING ONCE YOU HAD STARTED: 0
HOW OFTEN DURING THE LAST YEAR HAVE YOU HAD A FEELING OF GUILT OR REMORSE AFTER DRINKING: 0
HOW OFTEN DO YOU HAVE SIX OR MORE DRINKS ON ONE OCCASION: 0

## 2021-06-03 ASSESSMENT — PATIENT HEALTH QUESTIONNAIRE - PHQ9
SUM OF ALL RESPONSES TO PHQ QUESTIONS 1-9: 0
1. LITTLE INTEREST OR PLEASURE IN DOING THINGS: 0
SUM OF ALL RESPONSES TO PHQ QUESTIONS 1-9: 0
2. FEELING DOWN, DEPRESSED OR HOPELESS: 0
SUM OF ALL RESPONSES TO PHQ QUESTIONS 1-9: 0
SUM OF ALL RESPONSES TO PHQ9 QUESTIONS 1 & 2: 0

## 2021-06-03 NOTE — PROGRESS NOTES
Medicare Annual Wellness Visit  Name: Francisco Sage Date: 2021   MRN: [id number] Sex: Female   Age: 82 y.o. Ethnicity: Non-/Non    : 1935 Race: Black      Myrtle Jere Meckel is here for Medicare AWV    Screenings for behavioral, psychosocial and functional/safety risks, and cognitive dysfunction are all negative except as indicated below. These results, as well as other patient data from the [address] form, are documented in Flowsheets linked to this Encounter. Patient has hypertension. Patient declines medication for blood pressure. Patient decreases salt. Patient states she walks 40 minutes about every day. Patient has hypothyroidism. Patient takes Synthroid 75 mcg once daily. Patient states she missed taking Synthroid twice this past week. Patient has constipation. Patient states she has a bowel movement four times per week. Patient states she tries to eat oatmeal.    Patient is seeing orthopedic spine for back pain. Patient states Medrol dose pack was prescribed in the nurse at her facility gave her all 6 tablets at once on the first day and she could not sleep, had upset stomach, and could not focus. Patient states the next day they did the prescription correctly. No Known Allergies      Prior to Visit Medications    Medication Sig Taking? Authorizing Provider   docusate sodium (COLACE) 100 MG capsule Take 1 capsule by mouth 2 times daily Yes Lorenso Boast, MD   methylPREDNISolone (MEDROL, LUDWIG,) 4 MG tablet Take by mouth. Yes Camille Moser PA-C   lidocaine (LIDODERM) 5 % Place 1 patch onto the skin daily 12 hours on, 12 hours off.  Yes Lorenso Boast, MD   Multiple Vitamins-Minerals (THERAPEUTIC MULTIVITAMIN-MINERALS) tablet Take 1 tablet by mouth daily Yes Historical Provider, MD   aspirin (ASPIRIN 81) 81 MG EC tablet Take 1 tablet by mouth daily Yes Lorenso Boast, MD   vitamin D (CHOLECALCIFEROL) 1000 UNIT TABS tablet Take 1,000 Units by mouth daily Yes Historical Provider, MD   SYNTHROID 75 MCG tablet Take 1 tablet by mouth Daily  Regan Mcdaniel MD         Past Medical History:   Diagnosis Date    CAD (coronary artery disease)     Cancer ([facility] [address]     breast    Cerebral artery occlusion with cerebral infarction ([facility] [address]     Chronic mid back pain     Hearing deficit     History of alcohol abuse     IN THE PAST    History of breast cancer     Hyperlipidemia     Hypertension     Macular degeneration     Medial meniscus tear     Mixed hyperlipidemia 9/17/2019    Osteoarthritis of knee     Osteoporosis     Peripheral neuropathy     Postsurgical hypothyroidism     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    Retinal dystrophy     Tremor        Past Surgical History:   Procedure Laterality Date    BREAST LUMPECTOMY      bilateral    HYSTERECTOMY      KNEE CARTILAGE SURGERY      SKIN BIOPSY Right 12/24/2019    EXCISION OF SOFT TISSUE MASS RIGHT BACK AND EXCISION OF SKIN LESION RIGHT BACK performed by Tri Wheeler MD at [address] THYROIDECTOMY      TOTAL KNEE ARTHROPLASTY           Family History   Problem Relation Age of Onset    Heart Disease Mother     Cancer Father        CareTeam (Including outside providers/suppliers regularly involved in providing care):   Patient Care Team:  Regan Mcdaniel MD as PCP - General (Internal Medicine)  Regan Mcdaniel MD as PCP - [facility] Empaneled Provider    Wt Readings from Last 3 Encounters:   06/03/21 130 lb (59 kg)   06/01/21 131 lb 6.4 oz (59.6 kg)   05/25/21 129 lb (58.5 kg)     Vitals:    06/03/21 1008   BP: 130/80   Pulse: 78   Resp: 20   Temp: 97.1 °F (36.2 °C)   TempSrc: Temporal   SpO2: 97%   Weight: 130 lb (59 kg)   Height: 5' 4\" (1.626 m)     Body mass index is 22.31 kg/m². Based upon direct observation of the patient, evaluation of cognition reveals recent and remote memory intact.     General Appearance: alert and oriented to person, place and time, well-developed and well-nourished, in no acute distress  Head: normocephalic and atraumatic  Eyes: pupils equal, round, and reactive to light, extraocular eye movements intact, conjunctivae normal  ENT: tympanic membrane, external ear and ear canal normal bilaterally  Neck: neck supple and non tender without mass, no thyromegaly or thyroid nodules, no cervical lymphadenopathy   Pulmonary/Chest: clear to auscultation bilaterally- no wheezes, rales or rhonchi, normal air movement, no respiratory distress  Cardiovascular: normal rate, regular rhythm, normal S1 and S2, no murmurs and no carotid bruits  Abdomen: soft, non-tender, non-distended, normal bowel sounds, no masses or organomegaly  Extremities: no edema  Neurologic: gait and coordination normal and speech normal    Patient's complete Health Risk Assessment and screening values have been reviewed and are found in Flowsheets. The following problems were reviewed today and where indicated follow up appointments were made and/or referrals ordered. Positive Risk Factor Screenings with Interventions:            Hearing/Vision:  No exam data present  Hearing/Vision  Do you or your family notice any trouble with your hearing that hasn't been managed with hearing aids?: No  Do you have difficulty driving, watching TV, or doing any of your daily activities because of your eyesight?: (!) Yes  Have you had an eye exam within the past year?: Yes (legally blind)  Hearing/Vision Interventions:  · Vision concerns:  Pt sees Ophthalmology     ADL:  ADLs  In the past 7 days, did you need help from others to perform any of the following everyday activities? Eating, dressing, grooming, bathing, toileting, or walking/balance?: None  In the past 7 days, did you need help from others to take care of any of the following?  Laundry, housekeeping, banking/finances, shopping, telephone use, food preparation, transportation, or taking medications?: [employer], Banking/Finances, Transportation  ADL Interventions:  · Patient has assistance. Patient lives in [place] [address]. Patient's son manages her finances. Personalized Preventive Plan   Current Health Maintenance Status  Immunization History   Administered Date(s) Administered    Influenza, High Dose (Fluzone 65 yrs and older) 11/19/2014, 11/08/2016, 10/10/2017, 09/11/2018    Influenza, Triv, inactivated, subunit, adjuvanted, IM (Fluad 65 yrs and older) 03/10/2020    PPD Test 05/24/2020    Pneumococcal Polysaccharide ([id number]) 06/29/2007, 11/19/2014        Health Maintenance   Topic Date Due    DTaP/Tdap/Td vaccine (1 - Tdap) Never done    Shingles Vaccine (1 of 2) Never done    DEXA (modify frequency per FRAX score)  Never done   [employer] Visit (AWV)  Never done    Flu vaccine (Season Ended) 09/01/2021    Potassium monitoring  04/13/2022    Creatinine monitoring  04/13/2022    TSH testing  06/03/2022    Pneumococcal 65+ years Vaccine  Completed    COVID-19 Vaccine  Completed    Hepatitis A vaccine  Aged Out    Hepatitis B vaccine  Aged Out    Hib vaccine  Aged Out    Meningococcal (ACWY) vaccine  Aged Out     Recommendations for [employer] Due: see orders and patient instructions/AVS.  .   Recommended screening schedule for the next 5-10 years is provided to the patient in written form: see Patient Instructions/AVS.    Lab Review   Office Visit on 05/06/2021   Component Date Value    Color, UA 05/06/2021 yellow     Clarity, UA 05/06/2021 clear     Glucose, UA POC 05/06/2021 neg     Bilirubin, UA 05/06/2021 neg     Ketones, UA 05/06/2021 neg     Spec Grav, UA 05/06/2021 1.025     Blood, UA POC 05/06/2021 trace- intact     pH, UA 05/06/2021 7.0     Protein, UA POC 05/06/2021 neg     Urobilinogen, UA 05/06/2021 0.2     Leukocytes, UA 05/06/2021 neg     Nitrite, UA 05/06/2021 neg     Hyaline Casts, UA 05/06/2021 0     WBC, UA 05/06/2021 1     RBC, UA 05/06/2021 5*    Epithelial Cells, UA 05/06/2021 0  Urine Type 05/06/2021 Cleancatch     Organism 05/06/2021 Escherichia coli*    Urine Culture, Routine 05/06/2021 50,000 CFU/ml    Orders Only on 04/13/2021   Component Date Value    Sodium 04/13/2021 143     Potassium 04/13/2021 3.9     Chloride 04/13/2021 106     CO2 04/13/2021 26     Anion Gap 04/13/2021 11     Glucose 04/13/2021 80     BUN 04/13/2021 7     CREATININE 04/13/2021 0.6     GFR Non- 04/13/2021 >60     GFR  04/13/2021 >60     Calcium 04/13/2021 9.3     Total Protein 04/13/2021 6.3*    Albumin 04/13/2021 4.3     Albumin/Globulin Ratio 04/13/2021 2.2     Total Bilirubin 04/13/2021 0.5     Alkaline Phosphatase 04/13/2021 83     ALT 04/13/2021 13     AST 04/13/2021 21     Globulin 04/13/2021 2.0     Cholesterol, Total 04/13/2021 224*    Triglycerides 04/13/2021 64     HDL 04/13/2021 101*    LDL Calculated 04/13/2021 110*    VLDL Cholesterol Calcula* 04/13/2021 13          Filomena was seen today for medicare awv. Diagnoses and all orders for this visit:    1. Routine general medical examination at a health care facility  -Medicare AWV done    2. Essential hypertension  -stable off medication  -patient declines medication  -Low sodium diet  -Regular aerobic exercise    3. Postsurgical hypothyroidism  -stable  -Continue same medications  -TSH without Reflex; Future    4. Constipation, unspecified constipation type  - docusate sodium (COLACE) 100 MG capsule; Take 1 capsule by mouth 2 times daily  -high fiber diet    5. Need for Tdap vaccination  -Pt given Rx for Tdap vaccine to have done at their pharmacy  - Tetanus-Diphth-Acell Pertussis ([address]) 5-2.5-18.5 LF-MCG/0.5 injection; Inject 0.5 mLs into the muscle once for 1 dose  Dispense: 0.5 mL; Refill: 0    6.  Need for shingles vaccine  -Patient given prescription for Shingrix vaccine to have done at their pharmacy  - zoster recombinant adjuvanted vaccine [facility]) 50 MCG/0.5ML SUSR injection; Inject 0.5 mLs into the muscle See Admin Instructions 1 dose now and repeat in 2-6 months  Dispense: 0.5 mL; Refill: 1      Return in 3 months (on 9/3/2021) for hypertension, hypothyroidism.

## 2021-06-03 NOTE — PATIENT INSTRUCTIONS
Personalized Preventive Plan for Montalvo Simple - 6/3/2021  Medicare offers a range of preventive health benefits. Some of the tests and screenings are paid in full while other may be subject to a deductible, co-insurance, and/or copay. Some of these benefits include a comprehensive review of your medical history including lifestyle, illnesses that may run in your family, and various assessments and screenings as appropriate. After reviewing your medical record and screening and assessments performed today your provider may have ordered immunizations, labs, imaging, and/or referrals for you. A list of these orders (if applicable) as well as your Preventive Care list are included within your After Visit Summary for your review. Other Preventive Recommendations:    · A preventive eye exam performed by an eye specialist is recommended every 1-2 years to screen for glaucoma; cataracts, macular degeneration, and other eye disorders. · A preventive dental visit is recommended every 6 months. · Try to get at least 150 minutes of exercise per week or 10,000 steps per day on a pedometer . · Order or download the FREE \"Exercise & Physical Activity: Your Everyday Guide\" from [employer]. Call 5-127.384.7664 or search [employer] online. · You need 0801-2827 mg of calcium and 7118-1275 IU of vitamin D per day. It is possible to meet your calcium requirement with diet alone, but a vitamin D supplement is usually necessary to meet this goal.  · When exposed to the sun, use a sunscreen that protects against both UVA and UVB radiation with an SPF of 30 or greater. Reapply every 2 to 3 hours or after sweating, drying off with a towel, or swimming. · Always wear a seat belt when traveling in a car. Always wear a helmet when riding a bicycle or motorcycle.

## 2021-06-04 DIAGNOSIS — E89.0 POSTSURGICAL HYPOTHYROIDISM: ICD-10-CM

## 2021-06-04 RX ORDER — LEVOTHYROXINE SODIUM 75 MCG
75 TABLET ORAL DAILY
Qty: 90 TABLET | Refills: 1 | Status: SHIPPED | OUTPATIENT
Start: 2021-06-04

## 2021-06-04 RX ORDER — LEVOTHYROXINE SODIUM 0.07 MG/1
75 TABLET ORAL DAILY
Qty: 90 TABLET | Refills: 1 | Status: SHIPPED | OUTPATIENT
Start: 2021-06-04 | End: 2021-06-04 | Stop reason: SDUPTHER

## 2021-06-08 ENCOUNTER — HOSPITAL ENCOUNTER (OUTPATIENT)
Dept: MRI IMAGING | Age: 86
Discharge: HOME | End: 2021-06-08
Payer: MEDICARE

## 2021-06-08 DIAGNOSIS — M51.36 DDD (DEGENERATIVE DISC DISEASE), LUMBAR: ICD-10-CM

## 2021-06-08 DIAGNOSIS — M54.16 LUMBAR RADICULITIS: ICD-10-CM

## 2021-06-08 PROCEDURE — 72148 MRI LUMBAR SPINE W/O DYE: CPT

## 2021-06-11 PROBLEM — K59.00 CONSTIPATION: Status: ACTIVE | Noted: 2021-06-11

## 2021-06-15 ENCOUNTER — OFFICE VISIT (OUTPATIENT)
Dept: ORTHOPEDIC SURGERY | Age: 86
End: 2021-06-15
Payer: MEDICARE

## 2021-06-15 VITALS — WEIGHT: 130 LBS | BODY MASS INDEX: 22.2 KG/M2 | HEIGHT: 64 IN

## 2021-06-15 DIAGNOSIS — M54.16 LUMBAR RADICULITIS: ICD-10-CM

## 2021-06-15 DIAGNOSIS — M51.36 DDD (DEGENERATIVE DISC DISEASE), LUMBAR: Primary | ICD-10-CM

## 2021-06-15 PROCEDURE — 1123F ACP DISCUSS/DSCN MKR DOCD: CPT | Performed by: PHYSICIAN ASSISTANT

## 2021-06-15 PROCEDURE — G8400 PT W/DXA NO RESULTS DOC: HCPCS | Performed by: PHYSICIAN ASSISTANT

## 2021-06-15 PROCEDURE — G8427 DOCREV CUR MEDS BY ELIG CLIN: HCPCS | Performed by: PHYSICIAN ASSISTANT

## 2021-06-15 PROCEDURE — 4040F PNEUMOC VAC/ADMIN/RCVD: CPT | Performed by: PHYSICIAN ASSISTANT

## 2021-06-15 PROCEDURE — 1036F TOBACCO NON-USER: CPT | Performed by: PHYSICIAN ASSISTANT

## 2021-06-15 PROCEDURE — 1090F PRES/ABSN URINE INCON ASSESS: CPT | Performed by: PHYSICIAN ASSISTANT

## 2021-06-15 PROCEDURE — G8420 CALC BMI NORM PARAMETERS: HCPCS | Performed by: PHYSICIAN ASSISTANT

## 2021-06-15 PROCEDURE — 99213 OFFICE O/P EST LOW 20 MIN: CPT | Performed by: PHYSICIAN ASSISTANT

## 2021-06-15 NOTE — PROGRESS NOTES
Date    BREAST LUMPECTOMY      bilateral    HYSTERECTOMY      KNEE CARTILAGE SURGERY      SKIN BIOPSY Right 12/24/2019    EXCISION OF SOFT TISSUE MASS RIGHT BACK AND EXCISION OF SKIN LESION RIGHT BACK performed by Pilar Owen MD at [facility]       Current Medications:     Current Outpatient Medications:     SYNTHROID 75 MCG tablet, Take 1 tablet by mouth Daily, Disp: 90 tablet, Rfl: 1    zoster recombinant adjuvanted vaccine (SHINGRIX) 50 MCG/0.5ML SUSR injection, Inject 0.5 mLs into the muscle See Admin Instructions 1 dose now and repeat in 2-6 months, Disp: 0.5 mL, Rfl: 1    docusate sodium (COLACE) 100 MG capsule, Take 1 capsule by mouth 2 times daily, Disp: , Rfl:     methylPREDNISolone (MEDROL, LUDWIG,) 4 MG tablet, Take by mouth., Disp: 1 kit, Rfl: 0    lidocaine (LIDODERM) 5 %, Place 1 patch onto the skin daily 12 hours on, 12 hours off., Disp: 30 patch, Rfl: 1    Multiple Vitamins-Minerals (THERAPEUTIC MULTIVITAMIN-MINERALS) tablet, Take 1 tablet by mouth daily, Disp: , Rfl:     aspirin (ASPIRIN 81) 81 MG EC tablet, Take 1 tablet by mouth daily, Disp: , Rfl:     vitamin D (CHOLECALCIFEROL) 1000 UNIT TABS tablet, Take 1,000 Units by mouth daily, Disp: , Rfl:   Allergies:  Patient has no known allergies. Social History:    reports that she quit smoking about 40 years ago. Her smoking use included cigarettes. She has a 7.50 pack-year smoking history. She has never used smokeless tobacco. She reports current alcohol use. She reports that she does not use drugs.   Family History:   Family History   Problem Relation Age of Onset    Heart Disease Mother     Cancer Father        REVIEW OF SYSTEMS: Full ROS reviewed & scanned into chart  CONSTITUTIONAL: admits unexplained weight loss x1yr   SKIN: Denies skin conditions, skin cancer      PHYSICAL EXAM:    Vitals:      6/15/21 10:05 AM     Weight  130 lb (59 kg)     Height  5' 4\" (1.626 m)     Pain Score   3     Pain Loc  Back        GENERAL EXAM:  · General Apparence: Patient is adequately groomed with no evidence of malnutrition. · Orientation: The patient is oriented to time, place and person. · Mood & Affect:The patient's mood and affect are appropriate   · Lymphatic: The lymphatic examination bilaterally reveals all areas to be without enlargement or induration  · Sensation: Sensation is intact without deficit    LUMBAR/SACRAL EXAMINATION:  · Inspection: Local inspection shows no step-off or bruising. Mild kyphosis. · Palpation:   No evidence of tenderness at the midline. No tenderness bilaterally at the paraspinal or trochanters. There is no step-off or paraspinal spasm. · Range of Motion: Able to sit forward flex without pain  · Strength:   Strength testing is 5/5 in all muscle groups tested. · Special Tests:   Straight leg raise and crossed SLR negative. Leg length and pelvis level.  0 out of 5 Manisha's signs. · Skin: There are no rashes, ulcerations or lesions. · Reflexes: Reflexes are symmetrically 1-2+ at the patellar and ankle tendons. Clonus absent bilaterally at the feet. · Gait & station: Slightly forward flexed unassisted  · Additional Examinations:   · RIGHT LOWER EXTREMITY: Inspection/examination of the right lower extremity does not show any tenderness, deformity or injury. Range of motion is full. There is no gross instability. There are no rashes, ulcerations or lesions. Strength and tone are normal.  ·   · LEFT LOWER EXTREMITY:  Inspection/examination of the left lower extremity does not show any tenderness, deformity or injury. Range of motion is full. There is no gross instability. There are no rashes, ulcerations or lesions. Strength and tone are normal.    Diagnostic Testing:    Lumbar MRI scan report independently reviewed from June 2021 showing mild L5-S1 DDD without focal HNP. There is no significant central or foraminal stenosis.   Multilevel facet arthropathy. 2 views lumbar spine 5/25/2021 shows S1 lumbarization, mild L5-S1 DDD, multilevel facet arthropathy, right SI sclerosis        Impression:  1) 2.5mo LBP, right lumbar radiculitis--improved   2) S1 lumbarization, L5-S1 DDD  3) UTI s/p abx  4) H/o breast cancer         Plan:   1) We reviewed her lumbar MRI scan. She is overall feeling much improved at this time. 2) Recommend finishing out physical therapy then continuing HEP. Ergonomics discussed.   3) She will follow-up as needed        Electronically signed by Kira Naik PA-C, MPAS on 6/15/2021 at 10:18 AM     Kira Naik PA-C, [address] [facility] Certified by the [facility]

## 2021-06-24 ENCOUNTER — PROCEDURE VISIT (OUTPATIENT)
Dept: CARDIOLOGY CLINIC | Age: 86
End: 2021-06-24
Payer: MEDICARE

## 2021-06-24 DIAGNOSIS — I50.22 CHRONIC SYSTOLIC CONGESTIVE HEART FAILURE (HCC): ICD-10-CM

## 2021-06-24 DIAGNOSIS — I25.119 CORONARY ARTERY DISEASE INVOLVING NATIVE CORONARY ARTERY OF NATIVE HEART WITH ANGINA PECTORIS (HCC): ICD-10-CM

## 2021-06-24 PROCEDURE — 93321 DOPPLER ECHO F-UP/LMTD STD: CPT | Performed by: INTERNAL MEDICINE

## 2021-06-24 PROCEDURE — 93308 TTE F-UP OR LMTD: CPT | Performed by: INTERNAL MEDICINE

## 2021-07-12 DIAGNOSIS — M54.41 RIGHT-SIDED LOW BACK PAIN WITH RIGHT-SIDED SCIATICA, UNSPECIFIED CHRONICITY: ICD-10-CM

## 2021-07-12 RX ORDER — LIDOCAINE 50 MG/G
PATCH TOPICAL
Qty: 30 PATCH | Refills: 1 | Status: SHIPPED | OUTPATIENT
Start: 2021-07-12

## 2021-07-12 NOTE — TELEPHONE ENCOUNTER
Medication:   Requested Prescriptions     Pending Prescriptions Disp Refills    lidocaine (LIDODERM) 5 % [Pharmacy Med Name: LIDOCAINE 5% PATCH] 30 patch 1     Sig: APPLY 1 PATCH ONTO THE SKIN DAILY 12 HOURS ON 12 HOURS OFF     Last Filled:  05/06/21    Last appt: 6/3/2021   Next appt: 9/7/2021    Last OARRS: No flowsheet data found.

## 2021-07-22 ENCOUNTER — OFFICE VISIT (OUTPATIENT)
Dept: CARDIOLOGY CLINIC | Age: 86
End: 2021-07-22
Payer: MEDICARE

## 2021-07-22 VITALS
WEIGHT: 139.8 LBS | DIASTOLIC BLOOD PRESSURE: 78 MMHG | OXYGEN SATURATION: 95 % | HEART RATE: 78 BPM | HEIGHT: 64 IN | SYSTOLIC BLOOD PRESSURE: 138 MMHG | BODY MASS INDEX: 23.87 KG/M2

## 2021-07-22 DIAGNOSIS — I50.22 CHRONIC SYSTOLIC CONGESTIVE HEART FAILURE (HCC): Primary | ICD-10-CM

## 2021-07-22 PROCEDURE — G8420 CALC BMI NORM PARAMETERS: HCPCS | Performed by: INTERNAL MEDICINE

## 2021-07-22 PROCEDURE — 1090F PRES/ABSN URINE INCON ASSESS: CPT | Performed by: INTERNAL MEDICINE

## 2021-07-22 PROCEDURE — 99204 OFFICE O/P NEW MOD 45 MIN: CPT | Performed by: INTERNAL MEDICINE

## 2021-07-22 PROCEDURE — G8427 DOCREV CUR MEDS BY ELIG CLIN: HCPCS | Performed by: INTERNAL MEDICINE

## 2021-07-22 RX ORDER — CARVEDILOL 3.12 MG/1
3.12 TABLET ORAL 2 TIMES DAILY
Qty: 60 TABLET | Refills: 0 | Status: SHIPPED | OUTPATIENT
Start: 2021-07-22 | End: 2021-07-29 | Stop reason: ALTCHOICE

## 2021-07-22 RX ORDER — FUROSEMIDE 40 MG/1
40 TABLET ORAL DAILY
Qty: 30 TABLET | Refills: 0 | Status: SHIPPED | OUTPATIENT
Start: 2021-07-22 | End: 2021-08-05 | Stop reason: SDUPTHER

## 2021-07-22 NOTE — PROGRESS NOTES
Cc: HFrEF, frequent PVCs    HPI:     Patient is an 79-year-old woman, patient of Dr. Jose Toure, with history of congenital blindness bilaterally, HTN, HLP (declined statins), mild CAD, HFrEF (declined beta-blockers and ACE inhibitors), CVA, past smoker. Patient was referred to me for further evaluation and treatment of patient's worsening cardiac function. Patient currently lives at the [facility] living [facility]. [address] [place] [address] 2014: Minimal CAD (10 to 20%) in LAD and RCA    Nuclear 11/2019: Normal, LVEF 70%    Echo 03/2020: Mild LVH, LVEF 80-81%, diastolic grade 1, mild LAE, negative bubble study    Limited echo 06/2021: No ECG during image acquisition, however images suggest frequent ectopies. Normal LV size, LVEF 35-40%, IVC dilated (RAP 15 mm). ECG 6/1/2021: NSR 68 bpm with frequent PVCs. Patient reports increasing lower extremity edema and shortness of breath over the last 2 weeks. She denies any moses chest pains, palpitations. Histories     Past Medical History:   has a past medical history of CAD (coronary artery disease), Cancer ([address] Cerebral artery occlusion with cerebral infarction ([address] Chronic mid back pain, Hearing deficit, History of alcohol abuse, History of breast cancer, Hyperlipidemia, Hypertension, Macular degeneration, Medial meniscus tear, Mixed hyperlipidemia, Osteoarthritis of knee, Osteoporosis, Peripheral neuropathy, Postsurgical hypothyroidism, Reactive depression, Retinal dystrophy, and Tremor. Surgical History:   has a past surgical history that includes Hysterectomy; Thyroidectomy; Breast lumpectomy; Total knee arthroplasty; Knee cartilage surgery; and skin biopsy (Right, 12/24/2019). Social History:   reports that she quit smoking about 40 years ago. Her smoking use included cigarettes. She has a 7.50 pack-year smoking history. She has never used smokeless tobacco. She reports current alcohol use. She reports that she does not use drugs.      Family History:  No evidence for sudden cardiac death or premature CAD      Medications:     Home medications were reviewed and are listed below    Prior to Admission medications    Medication Sig Start Date End Date Taking? Authorizing Provider   furosemide (LASIX) 40 MG tablet Take 1 tablet by mouth daily 7/22/21  Yes Asya Elder MD   carvedilol (COREG) 3.125 MG tablet Take 1 tablet by mouth 2 times daily 7/22/21  Yes Asya Elder MD   lidocaine (LIDODERM) 5 % APPLY 1 PATCH ONTO THE SKIN DAILY 12 HOURS ON 12 HOURS OFF 7/12/21  Yes Hiren Ascencio MD   SYNTHROID 75 MCG tablet Take 1 tablet by mouth Daily 6/4/21  Yes Hiren Ascencio MD   zoster recombinant adjuvanted vaccine [facility]) 50 MCG/0.5ML SUSR injection Inject 0.5 mLs into the muscle See Admin Instructions 1 dose now and repeat in 2-6 months 6/3/21 11/30/21 Yes Hiren Ascencio MD   docusate sodium (COLACE) 100 MG capsule Take 1 capsule by mouth 2 times daily 6/3/21  Yes Hiren Ascencio MD   methylPREDNISolone (MEDROL, LUDWIG,) 4 MG tablet Take by mouth. 5/27/21  Yes Silas Bradley PA-C   Multiple Vitamins-Minerals (THERAPEUTIC MULTIVITAMIN-MINERALS) tablet Take 1 tablet by mouth daily   Yes Historical Provider, MD   aspirin (ASPIRIN 81) 81 MG EC tablet Take 1 tablet by mouth daily 5/28/20  Yes Hiren Ascencio MD   vitamin D (CHOLECALCIFEROL) 1000 UNIT TABS tablet Take 1,000 Units by mouth daily   Yes Historical Provider, MD          Allergy:     Patient has no known allergies. Review of Systems:     All 12 point review of symptoms completed. Pertinent positives identified in the HPI, all other review of symptoms negative as below. CONSTITUTIONAL: No fatigue  SKIN: No rash or pruritis. EYES: No visual changes or diplopia. No scleral icterus. ENT: No Headaches, hearing loss or vertigo. No mouth sores or sore throat. CARDIOVASCULAR: No chest pain/chest pressure/chest discomfort. No palpitations. + edema. RESPIRATORY: + dyspnea.  No cough or wheezing, no sputum production. GASTROINTESTINAL: No N/V/D. No abdominal pain, appetite loss, blood in stools. GENITOURINARY: No dysuria, trouble voiding, or hematuria. MUSCULOSKELETAL:  No gait disturbance, weakness or joint complaints. NEUROLOGICAL: No headache, diplopia, change in muscle strength, numbness or tingling. No change in gait, balance, coordination, mood, affect, memory, mentation, behavior. PSHYCH: No anxiety, loss of interest, change in sexual behavior, feelings of self-harm, or confusion. ENDOCRINE: No excessive thirst, fluid intake, or urination. No tremor. HEMATOLOGIC: No abnormal bruising or bleeding. ALLERGY: No nasal congestion or hives.       Physical Examination:     Vitals:    07/22/21 1056   BP: 138/78   Pulse: 78   SpO2: 95%   Weight: 139 lb 12.8 oz (63.4 kg)   Height: 5' 4\" (1.626 m)       Wt Readings from Last 3 Encounters:   07/22/21 139 lb 12.8 oz (63.4 kg)   06/15/21 130 lb (59 kg)   06/03/21 130 lb (59 kg)         General Appearance:  Alert, cooperative, no distress, appears stated age Appropriate weight   Head:  Normocephalic, without obvious abnormality, atraumatic   Eyes:  PERRL, conjunctiva/corneas clear EOM intact  Ears normal   Throat no lesions       Nose: Nares normal, no drainage or sinus tenderness   Throat: Lips, mucosa, and tongue normal   Neck: Supple, symmetrical, trachea midline, no adenopathy, thyroid: not enlarged, symmetric, no tenderness/mass/nodules, no carotid bruit       Lungs:   Clear to auscultation bilaterally, respirations unlabored   Chest Wall:  No tenderness or deformity   Heart:  Regular rhythm with frequent ectopies, rate is controlled, S1, S2 normal, there is no murmur, there is no rub or gallop, no jvd, 2-3+ bilateral lower extremity edema (up to lower thighs)   Abdomen:   Soft, non-tender, bowel sounds active all four quadrants,  no masses, no organomegaly       Extremities: Extremities normal, atraumatic, no cyanosis   Pulses: 2+ and symmetric   Skin: Skin color, texture, turgor normal, no rashes or lesions   Pysch: Normal mood and affect   Neurologic: Normal gross motor and sensory exam.  Cranial nerves intact        Labs:     Lab Results   Component Value Date    WBC 5.9 07/14/2020    HGB 13.9 07/14/2020    HCT 43.2 07/14/2020    MCV 90.8 07/14/2020     07/14/2020     Lab Results   Component Value Date     04/13/2021    K 3.9 04/13/2021     04/13/2021    CO2 26 04/13/2021    BUN 7 04/13/2021    CREATININE 0.6 04/13/2021    GLUCOSE 80 04/13/2021    CALCIUM 9.3 04/13/2021    PROT 6.3 (L) 04/13/2021    LABALBU 4.3 04/13/2021    BILITOT 0.5 04/13/2021    ALKPHOS 83 04/13/2021    AST 21 04/13/2021    ALT 13 04/13/2021    LABGLOM >60 04/13/2021    GFRAA >60 04/13/2021    AGRATIO 2.2 04/13/2021    GLOB 2.0 04/13/2021         Lab Results   Component Value Date    CHOL 224 (H) 04/13/2021    CHOL 180 02/27/2020     Lab Results   Component Value Date    TRIG 64 04/13/2021    TRIG 49 02/27/2020     Lab Results   Component Value Date     (H) 04/13/2021    HDL 98 (H) 02/27/2020     Lab Results   Component Value Date    LDLCALC 110 (H) 04/13/2021    LDLCALC 72 02/27/2020     Lab Results   Component Value Date    LABVLDL 13 04/13/2021    LABVLDL 10 02/27/2020     No results found for: [facility]    Lab Results   Component Value Date    INR 0.95 03/30/2020    PROTIME 11.0 03/30/2020       The ASCVD Risk score (Margarito Ayers., et al., 2013) failed to calculate for the following reasons: The 2013 ASCVD risk score is only valid for ages 36 to 78    The patient has a prior MI or stroke diagnosis      Assessment / Plan:      Diagnosis Orders   1. Chronic systolic congestive heart failure (HCC)  BASIC METABOLIC PANEL      1. Acute on chronic HFrEF:  Patient appears volume overloaded but remains hemodynamically stable.   She has new onset HFrEF which could be ischemic in etiology (despite the normal Jacklyn 2019 and minimal CAD per C in 2014) versus nonischemic (PVC induced cardiomyopathy, cannot exclude tachyarrhythmias). TSH 06/2021 was normal.  Compliance with medications may become an issue. I have discussed extensively with the patient her cardiac condition and implications, necessity for medical therapy. Patient appears to be agreeable. -We will start carvedilol at 3.125 twice daily and if BP tolerates/no significant side effects, will hopefully increase it further.  -We will consider an ACE/ARB next time  -Start Lasix 40 mg p.o. daily  -We will check a BMP in 2 weeks  -We will consider starting spironolactone next time  -Patient will need to have her weight checked on a daily basis at the assisted living facility and provide us with a log of values during next visit.  -Patient may need ischemic evaluation (if LVEF remains depressed despite suppressing PVC's) and possible EP consultation if frequent PVCs cannot be eliminated with beta-blockers.  -Patient may benefit from 2-week ZIO monitor to rule out subclinical tachyarrhythmias. 2.  CAD:  Patient has evidence of mild CAD per [facility] in 2014) normal Jacklyn in 2019. She does not appear to have any chest pain at this time though exertional shortness of breath could be an anginal equivalent.    -We will diurese per #1  -Once euvolemic, if patient continues to have exertional symptoms, she will need ischemic evaluation (repeat stress test versus [facility]). Will defer to her primary cardiologist Dr. Yazan Sinclair.  -Grisel Cordova with aspirin 81 mg daily  -We will start beta-blockers today  -Patient declined statins in the past, may need to readdress next time. 3.  Hyperlipidemia:  Per #2    4. Essential hypertension:  BP appears to be normal today.    -Per #1. Return in about 3 weeks (around 8/12/2021). I have spent 60 minutes of face to face time with the patient with more than 50% spent counseling and coordinating care.        I have personally reviewed the reports and images of labs, radiological studies, cardiac studies including ECG's and telemetry, current and old medical records. The note was completed using EMR and Dragon dictation system. Every effort was made to ensure accuracy; however, inadvertent computerized transcription errors may be present. All questions and concerns were addressed to the patient/family. Alternatives to my treatment were discussed. I would like to thank you for providing me the opportunity to participate in the care of your patient. If you have any questions, please do not hesitate to contact me.      Sylvie Denney MD, [place] [address] Phone: [phone]  Heart Failure Hotline: [phone]  Fax: [phone]

## 2021-07-26 ENCOUNTER — TELEPHONE (OUTPATIENT)
Dept: CARDIOLOGY CLINIC | Age: 86
End: 2021-07-26

## 2021-07-26 NOTE — TELEPHONE ENCOUNTER
Patient would like a phone call she is having side effects.  Patients # [phone]  Sob , tightness in her back, weak and she shakes 7-22--21 she started taking    (ciprofloxacin tablet) 3.4

## 2021-07-26 NOTE — TELEPHONE ENCOUNTER
Spoke with patient, she is has not been taking cipro. She has been taking coreg 3.125 twice a day and takes lasix 40 mg daily. Patient has been having worsening shortness of breath while walking, feels weak, and shaky. Please advise.

## 2021-07-29 ENCOUNTER — OFFICE VISIT (OUTPATIENT)
Dept: CARDIOLOGY CLINIC | Age: 86
End: 2021-07-29
Payer: MEDICARE

## 2021-07-29 VITALS
HEIGHT: 64 IN | BODY MASS INDEX: 22.3 KG/M2 | HEART RATE: 56 BPM | OXYGEN SATURATION: 97 % | WEIGHT: 130.6 LBS | DIASTOLIC BLOOD PRESSURE: 70 MMHG | SYSTOLIC BLOOD PRESSURE: 126 MMHG

## 2021-07-29 DIAGNOSIS — I50.22 CHRONIC SYSTOLIC CONGESTIVE HEART FAILURE (HCC): Primary | ICD-10-CM

## 2021-07-29 DIAGNOSIS — Z79.899 LONG TERM USE OF DRUG: ICD-10-CM

## 2021-07-29 PROCEDURE — 1123F ACP DISCUSS/DSCN MKR DOCD: CPT | Performed by: INTERNAL MEDICINE

## 2021-07-29 PROCEDURE — G8400 PT W/DXA NO RESULTS DOC: HCPCS | Performed by: INTERNAL MEDICINE

## 2021-07-29 PROCEDURE — 99214 OFFICE O/P EST MOD 30 MIN: CPT | Performed by: INTERNAL MEDICINE

## 2021-07-29 PROCEDURE — G8420 CALC BMI NORM PARAMETERS: HCPCS | Performed by: INTERNAL MEDICINE

## 2021-07-29 PROCEDURE — 1090F PRES/ABSN URINE INCON ASSESS: CPT | Performed by: INTERNAL MEDICINE

## 2021-07-29 PROCEDURE — 4040F PNEUMOC VAC/ADMIN/RCVD: CPT | Performed by: INTERNAL MEDICINE

## 2021-07-29 PROCEDURE — 1036F TOBACCO NON-USER: CPT | Performed by: INTERNAL MEDICINE

## 2021-07-29 PROCEDURE — G8427 DOCREV CUR MEDS BY ELIG CLIN: HCPCS | Performed by: INTERNAL MEDICINE

## 2021-07-29 RX ORDER — LOSARTAN POTASSIUM 25 MG/1
12.5 TABLET ORAL DAILY
Qty: 45 TABLET | Refills: 3 | Status: SHIPPED | OUTPATIENT
Start: 2021-07-29

## 2021-07-29 RX ORDER — LOSARTAN POTASSIUM 25 MG/1
12.5 TABLET ORAL DAILY
Qty: 15 TABLET | Refills: 1 | Status: SHIPPED | OUTPATIENT
Start: 2021-07-29

## 2021-07-29 NOTE — PROGRESS NOTES
Cc: HFrEF, frequent PVCs    HPI:     Patient is an 61-year-old woman, patient of Dr. Zandra Aly, with history of congenital blindness bilaterally, HTN, HLP (declined statins), mild CAD, HFrEF (declined beta-blockers and ACE inhibitors), CVA, past smoker, possible COPD. Patient was referred to me for further evaluation and treatment of patient's worsening cardiac function. Patient currently lives at the [facility] assisted living facility.     [facility] 2014: Minimal CAD (10 to 20%) in LAD and RCA     Nuclear 11/2019: Normal, LVEF 70%     Echo 03/2020: Mild LVH, LVEF 89-10%, diastolic grade 1, mild LAE, negative bubble study     Limited echo 06/2021: No ECG during image acquisition, however images suggest frequent ectopies. Normal LV size, LVEF 35-40%, IVC dilated (RAP 15 mm).     ECG 6/1/2021: NSR 68 bpm with frequent PVCs    Patient was originally seen in consultation for her heart failure on 7/22/2021. Due to evidence of volume overload, she was started on Lasix 40 mg p.o. daily and carvedilol 3.125 twice daily. Patient returns to the clinic today for follow-up. Since starting Coreg, she has noticed increased shortness of breath with exertion. Her weight has decreased by 9 pounds since her last visit, continues to have mild lower extremity edema. Histories     Past Medical History:   has a past medical history of CAD (coronary artery disease), Cancer ([address] Cerebral artery occlusion with cerebral infarction ([address] Chronic mid back pain, Hearing deficit, History of alcohol abuse, History of breast cancer, Hyperlipidemia, Hypertension, Macular degeneration, Medial meniscus tear, Mixed hyperlipidemia, Osteoarthritis of knee, Osteoporosis, Peripheral neuropathy, Postsurgical hypothyroidism, Reactive depression, Retinal dystrophy, and Tremor. Surgical History:   has a past surgical history that includes Hysterectomy; Thyroidectomy; Breast lumpectomy;  Total knee arthroplasty; Knee cartilage surgery; and skin biopsy (Right, 12/24/2019). Social History:   reports that she quit smoking about 40 years ago. Her smoking use included cigarettes. She has a 7.50 pack-year smoking history. She has never used smokeless tobacco. She reports current alcohol use. She reports that she does not use drugs. Family History:  No evidence for sudden cardiac death or premature CAD      Medications:     Home medications were reviewed and are listed below    Prior to Admission medications    Medication Sig Start Date End Date Taking? Authorizing Provider   losartan (COZAAR) 25 MG tablet Take 0.5 tablets by mouth daily 7/29/21  Yes Reean Zaidi MD   losartan (COZAAR) 25 MG tablet Take 0.5 tablets by mouth daily 7/29/21  Yes Reena Zaidi MD   furosemide (LASIX) 40 MG tablet Take 1 tablet by mouth daily 7/22/21  Yes Reena Zaidi MD   lidocaine (LIDODERM) 5 % APPLY 1 PATCH ONTO THE SKIN DAILY 12 HOURS ON 12 HOURS OFF 7/12/21  Yes Kt Hu MD   SYNTHROID 75 MCG tablet Take 1 tablet by mouth Daily 6/4/21  Yes Kt Hu MD   zoster recombinant adjuvanted vaccine [facility]) 50 MCG/0.5ML SUSR injection Inject 0.5 mLs into the muscle See Admin Instructions 1 dose now and repeat in 2-6 months 6/3/21 11/30/21 Yes Kt Hu MD   docusate sodium (COLACE) 100 MG capsule Take 1 capsule by mouth 2 times daily 6/3/21  Yes Kt Hu MD   Multiple Vitamins-Minerals (THERAPEUTIC MULTIVITAMIN-MINERALS) tablet Take 1 tablet by mouth daily   Yes Historical Provider, MD   aspirin (ASPIRIN 81) 81 MG EC tablet Take 1 tablet by mouth daily 5/28/20  Yes Kt Hu MD   vitamin D (CHOLECALCIFEROL) 1000 UNIT TABS tablet Take 1,000 Units by mouth daily   Yes Historical Provider, MD          Allergy:     Patient has no known allergies. Review of Systems:     All 12 point review of symptoms completed. Pertinent positives identified in the HPI, all other review of symptoms negative as below.     CONSTITUTIONAL: No fatigue  SKIN: No rash or pruritis. EYES: No visual changes or diplopia. No scleral icterus. ENT: No Headaches, hearing loss or vertigo. No mouth sores or sore throat. CARDIOVASCULAR: No chest pain/chest pressure/chest discomfort. No palpitations. + edema. RESPIRATORY: No dyspnea. No cough or wheezing, no sputum production. GASTROINTESTINAL: No N/V/D. No abdominal pain, appetite loss, blood in stools. GENITOURINARY: No dysuria, trouble voiding, or hematuria. MUSCULOSKELETAL:  No gait disturbance, weakness or joint complaints. NEUROLOGICAL: No headache, diplopia, change in muscle strength, numbness or tingling. No change in gait, balance, coordination, mood, affect, memory, mentation, behavior. PSHYCH: No anxiety, loss of interest, change in sexual behavior, feelings of self-harm, or confusion. ENDOCRINE: No excessive thirst, fluid intake, or urination. No tremor. HEMATOLOGIC: No abnormal bruising or bleeding. ALLERGY: No nasal congestion or hives.       Physical Examination:     Vitals:    07/29/21 1113   BP: 126/70   Pulse: 56   SpO2: 97%   Weight: 130 lb 9.6 oz (59.2 kg)   Height: 5' 4\" (1.626 m)       Wt Readings from Last 3 Encounters:   07/29/21 130 lb 9.6 oz (59.2 kg)   07/22/21 139 lb 12.8 oz (63.4 kg)   06/15/21 130 lb (59 kg)         General Appearance:  Alert, cooperative, no distress, appears stated age Appropriate weight   Head:  Normocephalic, without obvious abnormality, atraumatic   Eyes:  PERRL, conjunctiva/corneas clear EOM intact  Ears normal   Throat no lesions       Nose: Nares normal, no drainage or sinus tenderness   Throat: Lips, mucosa, and tongue normal   Neck: Supple, symmetrical, trachea midline, no adenopathy, thyroid: not enlarged, symmetric, no tenderness/mass/nodules, no carotid bruit       Lungs:   Clear to auscultation bilaterally, respirations unlabored   Chest Wall:  No tenderness or deformity   Heart:  Regular rhythm, rate is controlled, S1, S2 normal, there is no murmur, there is no rub or gallop, no jvd, 1+ bilateral lower extremity edema   Abdomen:   Soft, non-tender, bowel sounds active all four quadrants,  no masses, no organomegaly       Extremities: Extremities normal, atraumatic, no cyanosis   Pulses: 2+ and symmetric   Skin: Skin color, texture, turgor normal, no rashes or lesions   Pysch: Normal mood and affect   Neurologic: Normal gross motor and sensory exam.  Cranial nerves intact        Labs:     Lab Results   Component Value Date    WBC 5.9 07/14/2020    HGB 13.9 07/14/2020    HCT 43.2 07/14/2020    MCV 90.8 07/14/2020     07/14/2020     Lab Results   Component Value Date     04/13/2021    K 3.9 04/13/2021     04/13/2021    CO2 26 04/13/2021    BUN 7 04/13/2021    CREATININE 0.6 04/13/2021    GLUCOSE 80 04/13/2021    CALCIUM 9.3 04/13/2021    PROT 6.3 (L) 04/13/2021    LABALBU 4.3 04/13/2021    BILITOT 0.5 04/13/2021    ALKPHOS 83 04/13/2021    AST 21 04/13/2021    ALT 13 04/13/2021    LABGLOM >60 04/13/2021    GFRAA >60 04/13/2021    AGRATIO 2.2 04/13/2021    GLOB 2.0 04/13/2021         Lab Results   Component Value Date    CHOL 224 (H) 04/13/2021    CHOL 180 02/27/2020     Lab Results   Component Value Date    TRIG 64 04/13/2021    TRIG 49 02/27/2020     Lab Results   Component Value Date     (H) 04/13/2021    HDL 98 (H) 02/27/2020     Lab Results   Component Value Date    LDLCALC 110 (H) 04/13/2021    LDLCALC 72 02/27/2020     Lab Results   Component Value Date    LABVLDL 13 04/13/2021    LABVLDL 10 02/27/2020     No results found for: [facility]    Lab Results   Component Value Date    INR 0.95 03/30/2020    PROTIME 11.0 03/30/2020       The ASCVD Risk score (Jose Barbosa, et al., 2013) failed to calculate for the following reasons: The 2013 ASCVD risk score is only valid for ages 36 to 78    The patient has a prior MI or stroke diagnosis      Assessment / Plan:      Diagnosis Orders   1.  Chronic systolic congestive heart time.     3. Hyperlipidemia:  Per #2     4.  Essential hypertension:  BP appears to be normal today.     -Per #1. Return in about 4 weeks (around 8/26/2021). I have spent 35 minutes of face to face time with the patient with more than 50% spent counseling and coordinating care. I have personally reviewed the reports and images of labs, radiological studies, cardiac studies including ECG's and telemetry, current and old medical records. The note was completed using EMR and Dragon dictation system. Every effort was made to ensure accuracy; however, inadvertent computerized transcription errors may be present. All questions and concerns were addressed to the patient/family. Alternatives to my treatment were discussed. I would like to thank you for providing me the opportunity to participate in the care of your patient. If you have any questions, please do not hesitate to contact me.      Rosi Bryant MD, [facility] - [place] [address] Phone: [phone]  Heart Failure Hotline: [phone]  Fax: [phone]

## 2021-08-04 ENCOUNTER — TELEPHONE (OUTPATIENT)
Dept: PRIMARY CARE CLINIC | Age: 86
End: 2021-08-04

## 2021-08-05 ENCOUNTER — OFFICE VISIT (OUTPATIENT)
Dept: PRIMARY CARE CLINIC | Age: 86
End: 2021-08-05
Payer: MEDICARE

## 2021-08-05 VITALS
BODY MASS INDEX: 22.14 KG/M2 | SYSTOLIC BLOOD PRESSURE: 120 MMHG | RESPIRATION RATE: 14 BRPM | WEIGHT: 129 LBS | TEMPERATURE: 97.5 F | OXYGEN SATURATION: 99 % | DIASTOLIC BLOOD PRESSURE: 80 MMHG | HEART RATE: 70 BPM

## 2021-08-05 DIAGNOSIS — I10 ESSENTIAL HYPERTENSION: ICD-10-CM

## 2021-08-05 DIAGNOSIS — I48.0 PAROXYSMAL ATRIAL FIBRILLATION (HCC): Primary | ICD-10-CM

## 2021-08-05 DIAGNOSIS — I50.22 CHRONIC SYSTOLIC CONGESTIVE HEART FAILURE (HCC): ICD-10-CM

## 2021-08-05 DIAGNOSIS — R60.0 LOCALIZED EDEMA: ICD-10-CM

## 2021-08-05 PROCEDURE — 1123F ACP DISCUSS/DSCN MKR DOCD: CPT | Performed by: INTERNAL MEDICINE

## 2021-08-05 PROCEDURE — G8400 PT W/DXA NO RESULTS DOC: HCPCS | Performed by: INTERNAL MEDICINE

## 2021-08-05 PROCEDURE — 99214 OFFICE O/P EST MOD 30 MIN: CPT | Performed by: INTERNAL MEDICINE

## 2021-08-05 PROCEDURE — G8427 DOCREV CUR MEDS BY ELIG CLIN: HCPCS | Performed by: INTERNAL MEDICINE

## 2021-08-05 PROCEDURE — 1090F PRES/ABSN URINE INCON ASSESS: CPT | Performed by: INTERNAL MEDICINE

## 2021-08-05 PROCEDURE — G8420 CALC BMI NORM PARAMETERS: HCPCS | Performed by: INTERNAL MEDICINE

## 2021-08-05 PROCEDURE — 4040F PNEUMOC VAC/ADMIN/RCVD: CPT | Performed by: INTERNAL MEDICINE

## 2021-08-05 PROCEDURE — 1036F TOBACCO NON-USER: CPT | Performed by: INTERNAL MEDICINE

## 2021-08-05 RX ORDER — FUROSEMIDE 40 MG/1
40 TABLET ORAL DAILY
Qty: 30 TABLET | Refills: 5 | Status: SHIPPED | OUTPATIENT
Start: 2021-08-05

## 2021-08-05 RX ORDER — POTASSIUM CHLORIDE 20 MEQ/1
20 TABLET, EXTENDED RELEASE ORAL DAILY
Qty: 30 TABLET | Refills: 5 | Status: SHIPPED | OUTPATIENT
Start: 2021-08-05

## 2021-08-05 NOTE — ASSESSMENT & PLAN NOTE
On lasix,  Patient is compliant w medications, no side effects, effective, provides adequate symptom relief. No new symptoms or problems as noted by patient. The problem is stable, no changes noted by patient. Will consider monitoring labs and refill medications as appropriate. Patient counseled and will continue current plan.

## 2021-08-05 NOTE — PROGRESS NOTES
Subjective:      Patient ID: Yumiko Dobbins is a 82 y.o. female. HPI  C/o edema of both the legs, for the last few weeks,   Was given lasix by her cardiologist, 40 mg once daily in the am,   Denies sob or cp,   No leg pain, no redness of the legs,  Keeps her legs all the time down,  Has lost 10 lbs in the last 2 weeks,  meds reviewed,  No PND or othopnea,   On synthroid,   Chronic systolic congestive heart failure (HCC)  On lasix,  Patient is compliant w medications, no side effects, effective, provides adequate symptom relief. No new symptoms or problems as noted by patient. The problem is stable, no changes noted by patient. Will consider monitoring labs and refill medications as appropriate. Patient counseled and will continue current plan. Essential hypertension  This is a chronic problem. The problem is well controlled. Patient monitors readings regularly. Pertinent negatives include no chest pain, focal sensory loss, focal weakness, leg pain, myalgias or shortness of breath. No headaches or chest pain. Takes medications regularly. Blood pressure has been stable, blood work was reviewed, and advised patient to continue the current instructions or medications. Review of Systems  All the review of systems negative except above   Objective:   Physical Exam  /80 (Site: Left Upper Arm, Position: Sitting, Cuff Size: Medium Adult)   Pulse 70   Temp 97.5 °F (36.4 °C)   Resp 14   Wt 129 lb (58.5 kg)   SpO2 99%   BMI 22.14 kg/m²    The physical exam reveals a patient who appears well, alert and oriented x 3, pleasant, cooperative. Vitals are as noted. Head is atraumatic and normocephalic. Eyes reveal normal conjunctiva, cornea normal, pupils are equal and rective to light. Nasal mucosa is normal. Throat is normal without exudates. Ears reveal normal tympanic membranes. Neck is supple and free of adenopathy, or masses. No thyromegaly. No jugular venous distension.  Lungs are clear to auscultation, no rales or rhonchi noted. Heart sounds are regular , no murmurs, clicks, gallops or rubs. Abdomen is soft, no tenderness, masses or organomegaly. Bowel sounds are normally heard. Pelvis: normal. Extremities show 2 plus edema. Peripheral pulses are normal. Screening neurological exam is normal without focal findings. Cranial nerves are intact, reflexes are symmetrical and muscle strength eaqual. Skin is normal without suspicious lesions noted. Assessment:      Edema legs- likely due to CHF   Chronic systolic congestive heart failure (HCC)  On lasix,  Patient is compliant w medications, no side effects, effective, provides adequate symptom relief. No new symptoms or problems as noted by patient. The problem is stable, no changes noted by patient. Will consider monitoring labs and refill medications as appropriate. Patient counseled and will continue current plan. Essential hypertension  This is a chronic problem. The problem is well controlled. Patient monitors readings regularly. Pertinent negatives include no chest pain, focal sensory loss, focal weakness, leg pain, myalgias or shortness of breath. No headaches or chest pain. Takes medications regularly. Blood pressure has been stable, blood work was reviewed, and advised patient to continue the current instructions or medications. Plan:      Continue lasix 40 mg qd,  Start kcl 20 meq once daily,   Elastic compression stockings   F/u w cardiology.          Rufus Morrison MD

## 2021-08-10 ENCOUNTER — TELEPHONE (OUTPATIENT)
Dept: PRIMARY CARE CLINIC | Age: 86
End: 2021-08-10

## 2021-08-10 NOTE — TELEPHONE ENCOUNTER
[facility] was calling because patient will be moving to that facility and they sent over a Physician Certification Form to be filled out and was checking status of this advised Dr Monica Harmon has form and will fill everything out at her earliest convenience

## 2021-08-15 RX ORDER — CARVEDILOL 3.12 MG/1
TABLET ORAL
Qty: 60 TABLET | Refills: 0 | Status: SHIPPED | OUTPATIENT
Start: 2021-08-15

## 2021-08-19 ENCOUNTER — TELEPHONE (OUTPATIENT)
Dept: PRIMARY CARE CLINIC | Age: 86
End: 2021-08-19

## 2021-08-19 NOTE — TELEPHONE ENCOUNTER
Need a medication list sent to the [facility] the pt is now living there in [address] [place] [address]   The phone  [phone]    Fax number is [phone] at [facility]

## 2021-08-19 NOTE — TELEPHONE ENCOUNTER
Fax medication list we have on file. Call when done and also inform that the medication list was in the summary attached to the paperwork faxed to them on 8/11/21.

## 2022-01-06 ENCOUNTER — TELEPHONE (OUTPATIENT)
Dept: PRIMARY CARE CLINIC | Age: 87
End: 2022-01-06

## 2022-01-06 NOTE — TELEPHONE ENCOUNTER
Sriram Ramos called to let the Doctor know that a light fixture fell on the patients head yesterday 01/05/2022. States that the patient is doing fine but would like to talk to the doctor when she has a chance.          Thank you

## 2022-01-07 NOTE — TELEPHONE ENCOUNTER
Spoke with the Nurse at [facility] in the memory unit. Patient moved to [facility] last summer. I recommend they contact patient's current PCP for urgent matters since I am in [place]. Nurse had an emergency before I finished call and I did not get a chance to ask to speak to patient.

## 2022-04-25 ENCOUNTER — TELEPHONE (OUTPATIENT)
Dept: CARDIOLOGY CLINIC | Age: 87
End: 2022-04-25

## 2022-04-25 NOTE — TELEPHONE ENCOUNTER
Patient's nurse at [facility] called. Pt is out of losartan but believes she is not supposed to take this until she has an echo. Please call nurses station back about this at [phone].

## 2022-04-26 NOTE — TELEPHONE ENCOUNTER
Chelle called back from [address] to confirm pts dose of Losartan - pt was not ever out of  losartan. Pt is taking Losartan 12.5 mg daily.  Chelle verbalized understanding and will update pt's medication list.

## 2022-05-13 ENCOUNTER — APPOINTMENT (OUTPATIENT)
Dept: GENERAL RADIOLOGY | Age: 87
End: 2022-05-13

## 2022-05-13 ENCOUNTER — HOSPITAL ENCOUNTER (EMERGENCY)
Age: 87
Discharge: NURSING, REHAB OR LONG-TERM CARE | End: 2022-05-13
Attending: EMERGENCY MEDICINE

## 2022-05-13 VITALS
SYSTOLIC BLOOD PRESSURE: 135 MMHG | RESPIRATION RATE: 16 BRPM | TEMPERATURE: 98 F | OXYGEN SATURATION: 90 % | HEART RATE: 71 BPM | DIASTOLIC BLOOD PRESSURE: 82 MMHG

## 2022-05-13 DIAGNOSIS — I50.9 ACUTE DECOMPENSATED HEART FAILURE (CMD): Primary | ICD-10-CM

## 2022-05-13 LAB
ALBUMIN SERPL-MCNC: 3.2 G/DL (ref 3.6–5.1)
ALBUMIN/GLOB SERPL: 1.2 {RATIO} (ref 1–2.4)
ALP SERPL-CCNC: 80 UNITS/L (ref 45–117)
ALT SERPL-CCNC: 45 UNITS/L
ANION GAP SERPL CALC-SCNC: 10 MMOL/L (ref 10–20)
AST SERPL-CCNC: 51 UNITS/L
ATRIAL RATE (BPM): 71
BASOPHILS # BLD: 0 K/MCL (ref 0–0.3)
BASOPHILS NFR BLD: 1 %
BILIRUB SERPL-MCNC: 0.6 MG/DL (ref 0.2–1)
BUN SERPL-MCNC: 15 MG/DL (ref 6–20)
BUN/CREAT SERPL: 16 (ref 7–25)
CALCIUM SERPL-MCNC: 8.6 MG/DL (ref 8.4–10.2)
CHLORIDE SERPL-SCNC: 112 MMOL/L (ref 98–107)
CO2 SERPL-SCNC: 25 MMOL/L (ref 21–32)
CREAT SERPL-MCNC: 0.94 MG/DL (ref 0.51–0.95)
DEPRECATED RDW RBC: 47.7 FL (ref 39–50)
EOSINOPHIL # BLD: 0.2 K/MCL (ref 0–0.5)
EOSINOPHIL NFR BLD: 3 %
ERYTHROCYTE [DISTWIDTH] IN BLOOD: 15.3 % (ref 11–15)
FASTING DURATION TIME PATIENT: ABNORMAL H
FLUAV RNA RESP QL NAA+PROBE: NOT DETECTED
FLUBV RNA RESP QL NAA+PROBE: NOT DETECTED
GFR SERPLBLD BASED ON 1.73 SQ M-ARVRAT: 60 ML/MIN
GLOBULIN SER-MCNC: 2.6 G/DL (ref 2–4)
GLUCOSE SERPL-MCNC: 98 MG/DL (ref 70–99)
HCT VFR BLD CALC: 39.4 % (ref 36–46.5)
HGB BLD-MCNC: 12.5 G/DL (ref 12–15.5)
IMM GRANULOCYTES # BLD AUTO: 0 K/MCL (ref 0–0.2)
IMM GRANULOCYTES # BLD: 0 %
LYMPHOCYTES # BLD: 1.3 K/MCL (ref 1–4)
LYMPHOCYTES NFR BLD: 20 %
MCH RBC QN AUTO: 27.2 PG (ref 26–34)
MCHC RBC AUTO-ENTMCNC: 31.7 G/DL (ref 32–36.5)
MCV RBC AUTO: 85.8 FL (ref 78–100)
MONOCYTES # BLD: 0.7 K/MCL (ref 0.3–0.9)
MONOCYTES NFR BLD: 11 %
NEUTROPHILS # BLD: 4.3 K/MCL (ref 1.8–7.7)
NEUTROPHILS NFR BLD: 65 %
NRBC BLD MANUAL-RTO: 0 /100 WBC
NT-PROBNP SERPL-MCNC: 4146 PG/ML
P AXIS (DEGREES): 56
PLATELET # BLD AUTO: 173 K/MCL (ref 140–450)
POTASSIUM SERPL-SCNC: 3.9 MMOL/L (ref 3.4–5.1)
PR-INTERVAL (MSEC): 162
PROT SERPL-MCNC: 5.8 G/DL (ref 6.4–8.2)
QRS-INTERVAL (MSEC): 94
QT-INTERVAL (MSEC): 422
QTC: 459
R AXIS (DEGREES): 3
RBC # BLD: 4.59 MIL/MCL (ref 4–5.2)
REPORT TEXT: NORMAL
RSV AG NPH QL IA.RAPID: NOT DETECTED
SARS-COV-2 RNA RESP QL NAA+PROBE: NOT DETECTED
SERVICE CMNT-IMP: NORMAL
SERVICE CMNT-IMP: NORMAL
SODIUM SERPL-SCNC: 143 MMOL/L (ref 135–145)
T AXIS (DEGREES): 75
TROPONIN I SERPL DL<=0.01 NG/ML-MCNC: 40 NG/L
VENTRICULAR RATE EKG/MIN (BPM): 71
WBC # BLD: 6.6 K/MCL (ref 4.2–11)

## 2022-05-13 PROCEDURE — 99285 EMERGENCY DEPT VISIT HI MDM: CPT

## 2022-05-13 PROCEDURE — C9803 HOPD COVID-19 SPEC COLLECT: HCPCS

## 2022-05-13 PROCEDURE — 99285 EMERGENCY DEPT VISIT HI MDM: CPT | Performed by: STUDENT IN AN ORGANIZED HEALTH CARE EDUCATION/TRAINING PROGRAM

## 2022-05-13 PROCEDURE — 10002800 HB RX 250 W HCPCS: Performed by: STUDENT IN AN ORGANIZED HEALTH CARE EDUCATION/TRAINING PROGRAM

## 2022-05-13 PROCEDURE — 0241U COVID/FLU/RSV PANEL: CPT | Performed by: EMERGENCY MEDICINE

## 2022-05-13 PROCEDURE — 93010 ELECTROCARDIOGRAM REPORT: CPT | Performed by: INTERNAL MEDICINE

## 2022-05-13 PROCEDURE — 71045 X-RAY EXAM CHEST 1 VIEW: CPT

## 2022-05-13 PROCEDURE — 80053 COMPREHEN METABOLIC PANEL: CPT | Performed by: STUDENT IN AN ORGANIZED HEALTH CARE EDUCATION/TRAINING PROGRAM

## 2022-05-13 PROCEDURE — 85025 COMPLETE CBC W/AUTO DIFF WBC: CPT | Performed by: STUDENT IN AN ORGANIZED HEALTH CARE EDUCATION/TRAINING PROGRAM

## 2022-05-13 PROCEDURE — 93005 ELECTROCARDIOGRAM TRACING: CPT | Performed by: STUDENT IN AN ORGANIZED HEALTH CARE EDUCATION/TRAINING PROGRAM

## 2022-05-13 PROCEDURE — 96374 THER/PROPH/DIAG INJ IV PUSH: CPT

## 2022-05-13 PROCEDURE — 84484 ASSAY OF TROPONIN QUANT: CPT | Performed by: STUDENT IN AN ORGANIZED HEALTH CARE EDUCATION/TRAINING PROGRAM

## 2022-05-13 PROCEDURE — 83880 ASSAY OF NATRIURETIC PEPTIDE: CPT | Performed by: EMERGENCY MEDICINE

## 2022-05-13 RX ORDER — ACETAMINOPHEN 325 MG/1
650 TABLET ORAL ONCE
Status: DISCONTINUED | OUTPATIENT
Start: 2022-05-13 | End: 2022-05-13 | Stop reason: HOSPADM

## 2022-05-13 RX ORDER — FUROSEMIDE 10 MG/ML
40 INJECTION INTRAMUSCULAR; INTRAVENOUS ONCE
Status: COMPLETED | OUTPATIENT
Start: 2022-05-13 | End: 2022-05-13

## 2022-05-13 RX ADMIN — FUROSEMIDE 40 MG: 10 INJECTION, SOLUTION INTRAMUSCULAR; INTRAVENOUS at 16:21

## 2022-05-13 ASSESSMENT — ENCOUNTER SYMPTOMS
TROUBLE SWALLOWING: 0
ABDOMINAL PAIN: 0
POLYDIPSIA: 0
COUGH: 1
BLOOD IN STOOL: 0
SHORTNESS OF BREATH: 1
BACK PAIN: 0
WEAKNESS: 0
VOICE CHANGE: 0
COUGH: 0
EYE REDNESS: 0
SORE THROAT: 0
NERVOUS/ANXIOUS: 0
RHINORRHEA: 0
EYE PAIN: 0
CHILLS: 0
NUMBNESS: 0
FEVER: 0
WOUND: 0
BRUISES/BLEEDS EASILY: 0
DIZZINESS: 0
HEADACHES: 0
DIARRHEA: 0
AGITATION: 0
NAUSEA: 0
VOMITING: 0

## 2022-05-13 ASSESSMENT — PAIN SCALES - GENERAL: PAINLEVEL_OUTOF10: 0

## 2023-02-23 ASSESSMENT — ENCOUNTER SYMPTOMS
BLOOD IN STOOL: 0
ABDOMINAL DISTENTION: 0
WHEEZING: 0
BACK PAIN: 0
NAUSEA: 0
PHOTOPHOBIA: 0
EYE PAIN: 0
STRIDOR: 0
ABDOMINAL PAIN: 0
TROUBLE SWALLOWING: 0
DIARRHEA: 0
CONSTIPATION: 0
CHEST TIGHTNESS: 0
VOMITING: 0
COLOR CHANGE: 0
APNEA: 0

## 2023-02-23 NOTE — PROGRESS NOTES
[facility]     Outpatient Cardiology         Chief Complaint   Patient presents with    Coronary Artery Disease    Congestive Heart Failure       HPI     Alexandre Triplettr a 82 y.o. female with PMH of HTN, HLD, mild CAD, HFrEF, S/p dual chamber ICD on 23, breast cancer. Here to re-establish care. Recently moved back from [place]. Chronic systolic heart failure, compensated, feeling well. Currently on carvedilol, Entresto, Aldactone. Today we discussed adding Jardiance. Hypertension, well controlled  Hyperlipidemia, on Lipitor. No side effects  Nonobstructive coronary disease, on aspirin,   Overall feeling really well from a cardiovascular perspective. Compensated. Today we discussed adding Jardiance to her medications.       PMH  Past Medical History:   Diagnosis Date    CAD (coronary artery disease)     Cancer ([address]     breast    Cerebral artery occlusion with cerebral infarction (HCC)     Chronic mid back pain     Hearing deficit     History of alcohol abuse     IN THE PAST    History of breast cancer     Hyperlipidemia     Hypertension     Macular degeneration     Medial meniscus tear     Mixed hyperlipidemia 2019    Osteoarthritis of knee     Osteoporosis     Peripheral neuropathy     Postsurgical hypothyroidism     Reactive depression 2019    Retinal dystrophy     Tremor        PSH  Past Surgical History:   Procedure Laterality Date    BREAST LUMPECTOMY      bilateral    HYSTERECTOMY (CERVIX STATUS UNKNOWN)      KNEE CARTILAGE SURGERY      SKIN BIOPSY Right 2019    EXCISION OF SOFT TISSUE MASS RIGHT BACK AND EXCISION OF SKIN LESION RIGHT BACK performed by Stephen Danielson MD at [address] [place] [address]          Social HIstory  Social History     Tobacco Use    Smoking status: Former     Packs/day: 0.50     Years: 15.00     Pack years: 7.50     Types: Cigarettes     Quit date: 1981     Years since quittin.8 Smokeless tobacco: Never   Vaping Use    Vaping Use: Never used   Substance Use Topics    Alcohol use: Yes     Comment: rare    Drug use: Never       Family History  Family History   Problem Relation Age of Onset    Heart Disease Mother     Cancer Father        Allergies   No Known Allergies    Medications:     Home Medications:  Were reviewed and are listed in nursing record. and/or listed below    Prior to Admission medications    Medication Sig Start Date End Date Taking? Authorizing Provider   atorvastatin (LIPITOR) 10 MG tablet Take 10 mg by mouth daily   Yes Historical Provider, MD   9-Tzriftbzuaj-Pbeaa-N-neotetra (CULTURELLE IBS COMPLETE PO) Take by mouth   Yes Historical Provider, MD   polyethylene glycol (GLYCOLAX) 17 g packet Take 17 g by mouth daily as needed for Constipation   Yes Historical Provider, MD   spironolactone (ALDACTONE) 25 MG tablet Take 25 mg by mouth daily   Yes Historical Provider, MD   triamcinolone (KENALOG) 0.1 % cream Apply topically 2 times daily Apply topically 2 times daily.    Yes Historical Provider, MD   sacubitril-valsartan (ENTRESTO) 24-26 MG per tablet Take 1 tablet by mouth 2 times daily 2/28/23  Yes Chely Plascencia MD   furosemide (LASIX) 40 MG tablet Take 0.5 tablets by mouth See Admin Instructions 3 times per week 2/28/23  Yes Chely Plascencia MD   empagliflozin (JARDIANCE) 10 MG tablet Take 1 tablet by mouth daily 2/28/23  Yes Chely Plascencia MD   carvedilol (COREG) 3.125 MG tablet TAKE 1 TABLET BY MOUTH TWICE A DAY  Patient taking differently: 12.5 mg 2 times daily 8/15/21  Yes Autumn Cervantes MD   lidocaine (LIDODERM) 5 % APPLY 1 PATCH ONTO THE SKIN DAILY 12 HOURS ON 12 HOURS OFF 7/12/21  Yes Jackelyn Hernandez MD   SYNTHROID 75 MCG tablet Take 1 tablet by mouth Daily 6/4/21  Yes Jackelyn Hernandez MD   aspirin 81 MG EC tablet Take 1 tablet by mouth daily 5/28/20  Yes Jackelyn Hernandez MD   vitamin D (CHOLECALCIFEROL) 50 MCG (2000 UT) TABS tablet Take 1,000 Units by mouth daily   Yes Historical Provider, MD        Review of Systems   Constitutional:  Negative for activity change, appetite change, fatigue and unexpected weight change. HENT:  Negative for hearing loss and trouble swallowing. Eyes:  Negative for photophobia, pain and visual disturbance. Respiratory:  Negative for apnea, chest tightness, wheezing and stridor. Cardiovascular:  Negative for chest pain, palpitations and leg swelling. Gastrointestinal:  Negative for abdominal distention, abdominal pain, blood in stool, constipation, diarrhea, nausea and vomiting. Genitourinary:  Negative for flank pain and pelvic pain. Musculoskeletal:  Negative for back pain and neck pain. Skin:  Negative for color change and pallor. Neurological:  Negative for dizziness, syncope, facial asymmetry, speech difficulty, weakness, light-headedness, numbness and headaches. Psychiatric/Behavioral:  Negative for agitation, behavioral problems and confusion. Vitals:    02/28/23 1431   BP: 120/70   Pulse: 60    Weight: 134 lb 3.2 oz (60.9 kg)       Vitals:    02/28/23 1431   BP: 120/70   Pulse: 60   Weight: 134 lb 3.2 oz (60.9 kg)   Height: 5' 4\" (1.626 m)       BP Readings from Last 3 Encounters:   02/28/23 120/70   08/05/21 120/80   07/29/21 126/70       Wt Readings from Last 3 Encounters:   02/28/23 134 lb 3.2 oz (60.9 kg)   08/05/21 129 lb (58.5 kg)   07/29/21 130 lb 9.6 oz (59.2 kg)       Physical Exam  Constitutional:       General: She is not in acute distress. Appearance: She is well-developed. She is not diaphoretic. HENT:      Head: Normocephalic and atraumatic. Nose: Nose normal.      Mouth/Throat:      Pharynx: No oropharyngeal exudate. Eyes:      General:         Right eye: No discharge. Left eye: No discharge. Conjunctiva/sclera: Conjunctivae normal.      Pupils: Pupils are equal, round, and reactive to light. Neck:      Vascular: No JVD.       Trachea: No tracheal deviation. Cardiovascular:      Rate and Rhythm: Normal rate and regular rhythm. Heart sounds: Normal heart sounds. No murmur heard. No friction rub. No gallop. Pulmonary:      Effort: Pulmonary effort is normal. No respiratory distress. Breath sounds: Normal breath sounds. No stridor. No wheezing or rales. Chest:      Chest wall: No tenderness. Abdominal:      General: Bowel sounds are normal. There is no distension. Palpations: Abdomen is soft. Tenderness: There is no abdominal tenderness. There is no guarding or rebound. Musculoskeletal:         General: No tenderness or deformity. Normal range of motion. Cervical back: Normal range of motion and neck supple. Skin:     General: Skin is warm and dry. Capillary Refill: Capillary refill takes less than 2 seconds. Findings: No erythema. Neurological:      Mental Status: She is alert and oriented to person, place, and time. Motor: No abnormal muscle tone.    Psychiatric:         Behavior: Behavior normal.       Labs:       Lab Results   Component Value Date    WBC 5.9 07/14/2020    HGB 13.9 07/14/2020    HCT 43.2 07/14/2020    MCV 90.8 07/14/2020     07/14/2020     Lab Results   Component Value Date     08/05/2021    K 3.7 08/05/2021     08/05/2021    CO2 29 08/05/2021    BUN 22 (H) 08/05/2021    CREATININE 0.7 08/05/2021    GLUCOSE 94 08/05/2021    CALCIUM 9.4 08/05/2021    PROT 6.4 08/05/2021    LABALBU 4.4 08/05/2021    BILITOT 0.6 08/05/2021    ALKPHOS 89 08/05/2021    AST 21 08/05/2021    ALT 9 (L) 08/05/2021    LABGLOM >60 08/05/2021    GFRAA >60 08/05/2021    AGRATIO 2.2 08/05/2021    GLOB 2.0 08/05/2021         Lab Results   Component Value Date    CHOL 224 (H) 04/13/2021    CHOL 180 02/27/2020     Lab Results   Component Value Date    TRIG 64 04/13/2021    TRIG 49 02/27/2020     Lab Results   Component Value Date     (H) 04/13/2021    HDL 98 (H) 02/27/2020     Lab Results Component Value Date    LDLCALC 110 (H) 04/13/2021    LDLCALC 72 02/27/2020     Lab Results   Component Value Date    LABVLDL 13 04/13/2021    LABVLDL 10 02/27/2020     No results found for: [facility]    Lab Results   Component Value Date    INR 0.95 03/30/2020    PROTIME 11.0 03/30/2020       The ASCVD Risk score (Lia DK, et al., 2019) failed to calculate for the following reasons: The 2019 ASCVD risk score is only valid for ages 36 to 78    The patient has a prior MI or stroke diagnosis      Imaging:       Last ECG (if available):  Sinus rhythm     Last Stress (if available): 1/10/19  FINDINGS:  The overall image quality of the study is good. Myocardial SPECT perfusion imaging following stress reveals uniform uptake of tracer throughout the left ventricle with artifact. There is a mild defect in the inferior region likely due to diaphragmatic attenuation. Rest images are similar. Prone imaging reveals resolution of the defect in the inferior region. Left ventricular cavity size is normal. The overall left ventricular systolic function is normal. The calculated left ventricular ejection fraction is 70 %. No regional wall thickening or wall motion abnormalities are present at rest. Transient left ventricular cavity dilation at stress is not present. SUMMARY:   1. Myocardial perfusion imaging is normal with artifact. 2. There is no scintigraphic evidence of myocardial ischemia. 3. There is no scintigraphic evidence of prior myocardial infarction. 4. There is no definite ECG evidence of ischemia. 5. Diaphragmatic attenuation artifact accounts for the defect in the inferior region. 6. The overall left ventricular systolic function is normal. The calculated left ventricular ejection fraction is 70 %. 7. Compared to the prior nuclear perfusion study from 8/31/2016, the findings are similar. 8/31/16  IMPRESSION  Myocardial perfusion imaging is within normal limits.  The decreased tracer uptake in the apical region and the inferior and anterior areas of the apex is likely due to breast attenuation. There is borderline electrocardiographic but no clear   scintigraphic or clinical evidence of myocardial ischemia. There is no scintigraphic or electrocardiographic evidence of prior myocardial infarction. In addition, there is no scintigraphic evidence of left ventricular dysfunction. Compared to the prior   perfusion study of 11/18/2014, ischemia is no longer seen. Last Cath (if available): Cardiac cath (June 2022):  1. Mild non-obstructive coronary artery disease. 2. Ostial LAD/distal LM lesion 20%, confirmed with IVUS. 3. Normal central venous pressure    4. Normal pulmonary artery pressures. 5. Normal pulmonary artery wedge pressure. 6. Mildly depressed cardiac index/output by thermodilution and brianna   methods     Coronary Findings Diagnostic Dominance: Right  Left Main: Dist LM 20% discrete lesion. IVUS was performed. Left Anterior Descending: Ost LAD 20% discrete lesion. IVUS was performed. Dist LAD 30% diffuse lesion. Left Circumflex: The vessel exhibits minimal luminal irregularities. Prox Cx 20% diffuse lesion. Right Coronary Artery: The right coronary artery is a moderate sized vessel. The vessel exhibits minimal luminal irregularities. Last TTE/NEVIN(if available):  1/6/23  Conclusions   Summary   Technically difficult study 2/2 to body habitus with poor acoustic window. Severe LV dysfunction with akinetic inferior/inferolateral walls, severe   hypokinesis of lateral/anterior walls. 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  Overall left ventricular   systolic function appears globally reduced. Ejection fraction is visually   estimated to be 35-40%. IVC size is dilated (>2.1 cm) and collapses < 50% with respiration   consistent with elevated RA pressure (15 mmHg). 3/30/20  Summary   Normal left ventricular size. Mild concentric left ventricular hypertrophy. Mildly decreased left ventricular systolic function with an estimated   ejection fraction of 45%-50%. Mild global hypokinesis. Grade I diastolic dysfunction with normal LV filling pressures. The left atrium is mildly dilated. Estimated pulmonary artery systolic pressure is at 25 mmHg assuming a right   atrial pressure of 3 mmHg. A bubble study was performed and fails to show evidence of shunting. Last CMR  (if available):      Assessment / Plan:     Chronic systolic congestive heart failure (HCC)  Nonischemic. Feeling well. Continue Entresto, Coreg, Aldactone  Add Jardiance today. Lasix 3 times a week instead of daily. Recheck a renal panel and BNP in a week  Has ICD for primary prevention    Essential hypertension  Well-controlled on Entresto, carvedilol, Aldactone    Mixed hyperlipidemia  Continue Lipitor    Follow up in 6 months. I had the opportunity to review the clinical symptoms and presentation of Elpidio Cason. Patient's allergies and medications were reviewed and updated. Patient's past medical, surgical, social and family history were reviewed and updated. Patient's testing including laboratory, ECGs, monitor, imaging (TTE,NEVIN,CMR,cath) were reviewed. Tobacco use was discussed with the patient and educated on the negative effects. I have asked the patient to not utilize these agents. All questions and concerns were addressed to the patient/family. Alternatives to my treatment were discussed. The note was completed using EMR. Every effort wasmade to ensure accuracy; however, inadvertent computerized transcription errors may be present.     Thank you for allowing me to participate in [facility] or [employer] MICHELLE Matthews MD, [facility], [facility]

## 2023-02-28 ENCOUNTER — OFFICE VISIT (OUTPATIENT)
Dept: CARDIOLOGY CLINIC | Age: 88
End: 2023-02-28
Payer: MEDICARE

## 2023-02-28 VITALS
HEART RATE: 60 BPM | WEIGHT: 134.2 LBS | SYSTOLIC BLOOD PRESSURE: 120 MMHG | BODY MASS INDEX: 22.91 KG/M2 | HEIGHT: 64 IN | DIASTOLIC BLOOD PRESSURE: 70 MMHG

## 2023-02-28 DIAGNOSIS — I25.119 CORONARY ARTERY DISEASE INVOLVING NATIVE CORONARY ARTERY OF NATIVE HEART WITH ANGINA PECTORIS (HCC): ICD-10-CM

## 2023-02-28 DIAGNOSIS — I10 ESSENTIAL HYPERTENSION: ICD-10-CM

## 2023-02-28 DIAGNOSIS — I50.22 CHRONIC SYSTOLIC CONGESTIVE HEART FAILURE (HCC): Primary | ICD-10-CM

## 2023-02-28 DIAGNOSIS — I48.0 PAROXYSMAL ATRIAL FIBRILLATION (HCC): ICD-10-CM

## 2023-02-28 DIAGNOSIS — E89.0 POSTSURGICAL HYPOTHYROIDISM: ICD-10-CM

## 2023-02-28 DIAGNOSIS — R07.9 CHEST PAIN, UNSPECIFIED TYPE: ICD-10-CM

## 2023-02-28 DIAGNOSIS — I63.9 ACUTE CEREBROVASCULAR ACCIDENT (CVA) (HCC): ICD-10-CM

## 2023-02-28 DIAGNOSIS — E78.2 MIXED HYPERLIPIDEMIA: ICD-10-CM

## 2023-02-28 PROCEDURE — 99214 OFFICE O/P EST MOD 30 MIN: CPT | Performed by: INTERNAL MEDICINE

## 2023-02-28 PROCEDURE — 1036F TOBACCO NON-USER: CPT | Performed by: INTERNAL MEDICINE

## 2023-02-28 PROCEDURE — 1090F PRES/ABSN URINE INCON ASSESS: CPT | Performed by: INTERNAL MEDICINE

## 2023-02-28 PROCEDURE — G8400 PT W/DXA NO RESULTS DOC: HCPCS | Performed by: INTERNAL MEDICINE

## 2023-02-28 PROCEDURE — G8427 DOCREV CUR MEDS BY ELIG CLIN: HCPCS | Performed by: INTERNAL MEDICINE

## 2023-02-28 PROCEDURE — 93000 ELECTROCARDIOGRAM COMPLETE: CPT | Performed by: INTERNAL MEDICINE

## 2023-02-28 PROCEDURE — 3074F SYST BP LT 130 MM HG: CPT | Performed by: INTERNAL MEDICINE

## 2023-02-28 PROCEDURE — 1123F ACP DISCUSS/DSCN MKR DOCD: CPT | Performed by: INTERNAL MEDICINE

## 2023-02-28 PROCEDURE — 3078F DIAST BP <80 MM HG: CPT | Performed by: INTERNAL MEDICINE

## 2023-02-28 PROCEDURE — G8484 FLU IMMUNIZE NO ADMIN: HCPCS | Performed by: INTERNAL MEDICINE

## 2023-02-28 PROCEDURE — G8420 CALC BMI NORM PARAMETERS: HCPCS | Performed by: INTERNAL MEDICINE

## 2023-02-28 RX ORDER — LEVOTHYROXINE SODIUM 75 MCG
75 TABLET ORAL DAILY
Qty: 90 TABLET | Refills: 0 | OUTPATIENT
Start: 2023-02-28

## 2023-02-28 RX ORDER — FUROSEMIDE 40 MG/1
20 TABLET ORAL SEE ADMIN INSTRUCTIONS
Qty: 30 TABLET | Refills: 5 | Status: SHIPPED
Start: 2023-02-28

## 2023-02-28 RX ORDER — SACUBITRIL AND VALSARTAN 24; 26 MG/1; MG/1
1 TABLET, FILM COATED ORAL 2 TIMES DAILY
COMMUNITY
End: 2023-02-28 | Stop reason: SDUPTHER

## 2023-02-28 RX ORDER — SPIRONOLACTONE 25 MG/1
25 TABLET ORAL DAILY
COMMUNITY

## 2023-02-28 RX ORDER — ATORVASTATIN CALCIUM 10 MG/1
10 TABLET, FILM COATED ORAL DAILY
COMMUNITY

## 2023-02-28 RX ORDER — SACUBITRIL AND VALSARTAN 24; 26 MG/1; MG/1
1 TABLET, FILM COATED ORAL 2 TIMES DAILY
Qty: 60 TABLET | Refills: 0 | Status: SHIPPED | OUTPATIENT
Start: 2023-02-28 | End: 2023-03-03 | Stop reason: SDUPTHER

## 2023-02-28 RX ORDER — POLYETHYLENE GLYCOL 3350 17 G/17G
17 POWDER, FOR SOLUTION ORAL DAILY PRN
COMMUNITY

## 2023-02-28 RX ORDER — TRIAMCINOLONE ACETONIDE 1 MG/G
CREAM TOPICAL 2 TIMES DAILY
COMMUNITY

## 2023-02-28 NOTE — PATIENT INSTRUCTIONS
Start taking lasix 3 times /week  Start jardiance 10 mg daily  In one week, around March 7, please go to the nearest [facility] lab for lab draw

## 2023-02-28 NOTE — ASSESSMENT & PLAN NOTE
Nonischemic. Feeling well. Continue Entresto, Coreg, Aldactone  Add Jardiance today. Lasix 3 times a week instead of daily.   Recheck a renal panel and BNP in a week  Has ICD for primary prevention

## 2023-03-03 RX ORDER — SACUBITRIL AND VALSARTAN 24; 26 MG/1; MG/1
1 TABLET, FILM COATED ORAL 2 TIMES DAILY
Qty: 180 TABLET | Refills: 3 | Status: SHIPPED | OUTPATIENT
Start: 2023-03-03

## 2023-03-07 ENCOUNTER — OFFICE VISIT (OUTPATIENT)
Dept: PRIMARY CARE CLINIC | Age: 88
End: 2023-03-07
Payer: MEDICARE

## 2023-03-07 ENCOUNTER — TELEPHONE (OUTPATIENT)
Dept: CARDIOLOGY CLINIC | Age: 88
End: 2023-03-07

## 2023-03-07 ENCOUNTER — TELEPHONE (OUTPATIENT)
Dept: PRIMARY CARE CLINIC | Age: 88
End: 2023-03-07

## 2023-03-07 VITALS
SYSTOLIC BLOOD PRESSURE: 118 MMHG | BODY MASS INDEX: 22.31 KG/M2 | WEIGHT: 130 LBS | DIASTOLIC BLOOD PRESSURE: 84 MMHG | HEART RATE: 74 BPM | OXYGEN SATURATION: 98 %

## 2023-03-07 DIAGNOSIS — R35.0 URINARY FREQUENCY: ICD-10-CM

## 2023-03-07 DIAGNOSIS — I25.119 CORONARY ARTERY DISEASE INVOLVING NATIVE CORONARY ARTERY OF NATIVE HEART WITH ANGINA PECTORIS (HCC): ICD-10-CM

## 2023-03-07 DIAGNOSIS — I48.0 PAROXYSMAL ATRIAL FIBRILLATION (HCC): ICD-10-CM

## 2023-03-07 DIAGNOSIS — E78.2 MIXED HYPERLIPIDEMIA: ICD-10-CM

## 2023-03-07 DIAGNOSIS — I50.22 CHRONIC SYSTOLIC CONGESTIVE HEART FAILURE (HCC): ICD-10-CM

## 2023-03-07 DIAGNOSIS — I63.9 ACUTE CEREBROVASCULAR ACCIDENT (CVA) (HCC): ICD-10-CM

## 2023-03-07 DIAGNOSIS — I10 ESSENTIAL HYPERTENSION: Primary | ICD-10-CM

## 2023-03-07 DIAGNOSIS — E89.0 POSTSURGICAL HYPOTHYROIDISM: ICD-10-CM

## 2023-03-07 DIAGNOSIS — R07.9 CHEST PAIN, UNSPECIFIED TYPE: ICD-10-CM

## 2023-03-07 DIAGNOSIS — I10 ESSENTIAL HYPERTENSION: ICD-10-CM

## 2023-03-07 DIAGNOSIS — R39.15 URINARY URGENCY: ICD-10-CM

## 2023-03-07 LAB
A/G RATIO: 2 (ref 1.1–2.2)
ALBUMIN SERPL-MCNC: 4.2 G/DL (ref 3.4–5)
ALP BLD-CCNC: 78 U/L (ref 40–129)
ALT SERPL-CCNC: 8 U/L (ref 10–40)
ANION GAP SERPL CALCULATED.3IONS-SCNC: 13 MMOL/L (ref 3–16)
AST SERPL-CCNC: 17 U/L (ref 15–37)
BILIRUB SERPL-MCNC: 0.7 MG/DL (ref 0–1)
BUN BLDV-MCNC: 17 MG/DL (ref 7–20)
CALCIUM SERPL-MCNC: 9.5 MG/DL (ref 8.3–10.6)
CHLORIDE BLD-SCNC: 107 MMOL/L (ref 99–110)
CHOLESTEROL, TOTAL: 181 MG/DL (ref 0–199)
CO2: 24 MMOL/L (ref 21–32)
CREAT SERPL-MCNC: 0.8 MG/DL (ref 0.6–1.2)
GFR SERPL CREATININE-BSD FRML MDRD: >60 ML/MIN/{1.73_M2}
GLUCOSE BLD-MCNC: 83 MG/DL (ref 70–99)
HDLC SERPL-MCNC: 87 MG/DL (ref 40–60)
LDL CHOLESTEROL CALCULATED: 82 MG/DL
POTASSIUM SERPL-SCNC: 4.1 MMOL/L (ref 3.5–5.1)
PRO-BNP: 652 PG/ML (ref 0–449)
SODIUM BLD-SCNC: 144 MMOL/L (ref 136–145)
TOTAL PROTEIN: 6.3 G/DL (ref 6.4–8.2)
TRIGL SERPL-MCNC: 62 MG/DL (ref 0–150)
TSH SERPL DL<=0.05 MIU/L-ACNC: 0.76 UIU/ML (ref 0.27–4.2)
VLDLC SERPL CALC-MCNC: 12 MG/DL

## 2023-03-07 PROCEDURE — 1036F TOBACCO NON-USER: CPT | Performed by: INTERNAL MEDICINE

## 2023-03-07 PROCEDURE — 3074F SYST BP LT 130 MM HG: CPT | Performed by: INTERNAL MEDICINE

## 2023-03-07 PROCEDURE — G8420 CALC BMI NORM PARAMETERS: HCPCS | Performed by: INTERNAL MEDICINE

## 2023-03-07 PROCEDURE — 99214 OFFICE O/P EST MOD 30 MIN: CPT | Performed by: INTERNAL MEDICINE

## 2023-03-07 PROCEDURE — G8400 PT W/DXA NO RESULTS DOC: HCPCS | Performed by: INTERNAL MEDICINE

## 2023-03-07 PROCEDURE — 3078F DIAST BP <80 MM HG: CPT | Performed by: INTERNAL MEDICINE

## 2023-03-07 PROCEDURE — G8484 FLU IMMUNIZE NO ADMIN: HCPCS | Performed by: INTERNAL MEDICINE

## 2023-03-07 PROCEDURE — G8427 DOCREV CUR MEDS BY ELIG CLIN: HCPCS | Performed by: INTERNAL MEDICINE

## 2023-03-07 PROCEDURE — 1090F PRES/ABSN URINE INCON ASSESS: CPT | Performed by: INTERNAL MEDICINE

## 2023-03-07 PROCEDURE — 1123F ACP DISCUSS/DSCN MKR DOCD: CPT | Performed by: INTERNAL MEDICINE

## 2023-03-07 SDOH — ECONOMIC STABILITY: FOOD INSECURITY: WITHIN THE PAST 12 MONTHS, THE FOOD YOU BOUGHT JUST DIDN'T LAST AND YOU DIDN'T HAVE MONEY TO GET MORE.: NEVER TRUE

## 2023-03-07 SDOH — ECONOMIC STABILITY: INCOME INSECURITY: HOW HARD IS IT FOR YOU TO PAY FOR THE VERY BASICS LIKE FOOD, HOUSING, MEDICAL CARE, AND HEATING?: NOT HARD AT ALL

## 2023-03-07 SDOH — ECONOMIC STABILITY: FOOD INSECURITY: WITHIN THE PAST 12 MONTHS, YOU WORRIED THAT YOUR FOOD WOULD RUN OUT BEFORE YOU GOT MONEY TO BUY MORE.: NEVER TRUE

## 2023-03-07 SDOH — ECONOMIC STABILITY: HOUSING INSECURITY
IN THE LAST 12 MONTHS, WAS THERE A TIME WHEN YOU DID NOT HAVE A STEADY PLACE TO SLEEP OR SLEPT IN A SHELTER (INCLUDING NOW)?: NO

## 2023-03-07 ASSESSMENT — PATIENT HEALTH QUESTIONNAIRE - PHQ9
1. LITTLE INTEREST OR PLEASURE IN DOING THINGS: 0
SUM OF ALL RESPONSES TO PHQ QUESTIONS 1-9: 0
6. FEELING BAD ABOUT YOURSELF - OR THAT YOU ARE A FAILURE OR HAVE LET YOURSELF OR YOUR FAMILY DOWN: 0
2. FEELING DOWN, DEPRESSED OR HOPELESS: 0
SUM OF ALL RESPONSES TO PHQ QUESTIONS 1-9: 0
5. POOR APPETITE OR OVEREATING: 0
10. IF YOU CHECKED OFF ANY PROBLEMS, HOW DIFFICULT HAVE THESE PROBLEMS MADE IT FOR YOU TO DO YOUR WORK, TAKE CARE OF THINGS AT HOME, OR GET ALONG WITH OTHER PEOPLE: 0
SUM OF ALL RESPONSES TO PHQ9 QUESTIONS 1 & 2: 0
9. THOUGHTS THAT YOU WOULD BE BETTER OFF DEAD, OR OF HURTING YOURSELF: 0
7. TROUBLE CONCENTRATING ON THINGS, SUCH AS READING THE NEWSPAPER OR WATCHING TELEVISION: 0
SUM OF ALL RESPONSES TO PHQ QUESTIONS 1-9: 0
SUM OF ALL RESPONSES TO PHQ QUESTIONS 1-9: 0
8. MOVING OR SPEAKING SO SLOWLY THAT OTHER PEOPLE COULD HAVE NOTICED. OR THE OPPOSITE, BEING SO FIGETY OR RESTLESS THAT YOU HAVE BEEN MOVING AROUND A LOT MORE THAN USUAL: 0
3. TROUBLE FALLING OR STAYING ASLEEP: 0
4. FEELING TIRED OR HAVING LITTLE ENERGY: 0

## 2023-03-07 NOTE — TELEPHONE ENCOUNTER
----- Message from Teresita Lugo sent at 3/7/2023 11:47 AM EST -----  Subject: Medication Problem    Medication: atorvastatin (LIPITOR) 10 MG tablet  Dosage: Unknown   Ordering Provider: sergo    Question/Problem: Pt states that the assisted living facility has been   giving her Lipitor and after Pt's appointment she noticed that it was not   listed on her medication list print out. She was wanting to know if Dr. Tucker Robertson was aware that she is taking this medication and if she is to   continue taking it. Please advise.        Pharmacy: [address] [place] [address],[facility] Floor [phone] Keyana Stanton [phone]    ---------------------------------------------------------------------------  --------------  Patricia ESPINO  [phone]; OK to leave message on voicemail  ---------------------------------------------------------------------------  --------------    SCRIPT ANSWERS  Relationship to Patient: Self

## 2023-03-07 NOTE — TELEPHONE ENCOUNTER
Spoke with patient explained to her that Atorvastatin is the same medication as Lipitor. Patient verbalized understanding.

## 2023-03-07 NOTE — TELEPHONE ENCOUNTER
Request for Medical Records  Faxed to [facility] 3/7/2023

## 2023-03-07 NOTE — TELEPHONE ENCOUNTER
Spoke with patient, she takes lipitor 10 mg daily. I did ask if she needed a refill, she wasn't sure. She will call back if she needs one.

## 2023-03-07 NOTE — TELEPHONE ENCOUNTER
The patient called and wants to know whether or not she should be taking Lipitor as she is taking it. She does not know how many mg of Lipitor she is taking. Please call the patient back at [phone].

## 2023-03-07 NOTE — PROGRESS NOTES
drip, rhinorrhea, sinus pressure and sore throat. Eyes:  Positive for visual disturbance. Respiratory:  Negative for cough, chest tightness, shortness of breath and wheezing. Cardiovascular:  Negative for chest pain, palpitations and leg swelling. Gastrointestinal:  Positive for constipation. Negative for abdominal pain, blood in stool, diarrhea, nausea and vomiting. Genitourinary:  Positive for frequency and urgency. Negative for dysuria and hematuria. Musculoskeletal:  Negative for arthralgias and myalgias. Skin:  Negative for rash. Neurological:  Negative for dizziness, tremors, syncope, weakness, light-headedness, numbness and headaches. Psychiatric/Behavioral:  Negative for dysphoric mood and sleep disturbance. The patient is not nervous/anxious. No Known Allergies    Outpatient Medications Marked as Taking for the 3/7/23 encounter (Office Visit) with Fritzi Fothergill, MD   Medication Sig Dispense Refill    sacubitril-valsartan (ENTRESTO) 24-26 MG per tablet Take 1 tablet by mouth 2 times daily 180 tablet 3    1-Csdwaoecxln-Iwgjg-N-neotetra (CULTURELLE IBS COMPLETE PO) Take by mouth      polyethylene glycol (GLYCOLAX) 17 g packet Take 17 g by mouth daily as needed for Constipation      spironolactone (ALDACTONE) 25 MG tablet Take 25 mg by mouth daily      triamcinolone (KENALOG) 0.1 % cream Apply topically 2 times daily Apply topically 2 times daily.       furosemide (LASIX) 40 MG tablet Take 0.5 tablets by mouth See Admin Instructions 3 times per week 30 tablet 5    atorvastatin (LIPITOR) 10 MG tablet Take 10 mg by mouth daily      empagliflozin (JARDIANCE) 10 MG tablet Take 1 tablet by mouth daily 30 tablet 5    carvedilol (COREG) 3.125 MG tablet TAKE 1 TABLET BY MOUTH TWICE A DAY (Patient taking differently: 12.5 mg 2 times daily) 60 tablet 0    lidocaine (LIDODERM) 5 % APPLY 1 PATCH ONTO THE SKIN DAILY 12 HOURS ON 12 HOURS OFF 30 patch 1    SYNTHROID 75 MCG tablet Take 1 tablet by

## 2023-03-09 ENCOUNTER — TELEPHONE (OUTPATIENT)
Dept: CARDIOLOGY CLINIC | Age: 88
End: 2023-03-09

## 2023-03-09 ENCOUNTER — TELEPHONE (OUTPATIENT)
Dept: PRIMARY CARE CLINIC | Age: 88
End: 2023-03-09

## 2023-03-09 NOTE — TELEPHONE ENCOUNTER
Vinod Hameed from [address] [place] [address] Dr living called and stated pt wants her meds to be at her bedside. Need an order for that to happen.  Pls fax order to [phone]

## 2023-03-10 NOTE — TELEPHONE ENCOUNTER
Call Blaze David at [facility]. It is ok for patient to have her medications at her bedside. Inform her that she will receive a fax stating this.

## 2023-03-13 NOTE — TELEPHONE ENCOUNTER
Dr. Jyothi Lizama, the nursing facility is calling to have a letter sent over stating if patient can have their meds kept at their bedside or if this would be  inappropriate for patient.
I am not comfortable as I do not know this patient. Can you check with Dr. Emily Barton or Kaitlin Aj who is following?
Not sure this would be a reasonable request for her to do. Please advise.   Thanks
PCP faxed order patient could have medications at bedside.
Rivas from [facility] called inquiring about leaving her medication with the patient unattended. Patient wants to be able to have her medication left at her bedside at night so that when she wakes up, she can take it at 8 am instead of waiting until 10am.  A written letter needs to be faxed over stating whether or not the medication can be left out by her bedside or not. Fax number [phone].  Call back [phone]
Principal Discharge DX:	Stapled skin wound

## 2023-03-24 RX ORDER — ATORVASTATIN CALCIUM 10 MG/1
10 TABLET, FILM COATED ORAL DAILY
Qty: 90 TABLET | Refills: 1 | Status: SHIPPED | OUTPATIENT
Start: 2023-03-24

## 2023-03-24 NOTE — TELEPHONE ENCOUNTER
Medication:   Requested Prescriptions     Pending Prescriptions Disp Refills    atorvastatin (LIPITOR) 10 MG tablet 30 tablet      Sig: Take 1 tablet by mouth daily     Last Filled:      Last appt: 3/7/2023   Next appt: 6/7/2023    Last Lipid:   Lab Results   Component Value Date/Time    CHOL 181 03/07/2023 08:34 AM    TRIG 62 03/07/2023 08:34 AM    HDL 87 03/07/2023 08:34 AM    LDLCALC 82 03/07/2023 08:34 AM

## 2023-03-24 NOTE — TELEPHONE ENCOUNTER
Markell Moore from [address] [place] [address] living called and Pt needs meds refill.  Lov 3/7/23    atorvastatin (LIPITOR) 10 MG tablet   Pharmacy    [facility] [address], [address] [phone] - F [phone]   [place] [address]   Phone:  [phone]  Fax:  [phone]

## 2023-03-28 ENCOUNTER — TELEPHONE (OUTPATIENT)
Dept: PRIMARY CARE CLINIC | Age: 88
End: 2023-03-28

## 2023-03-28 DIAGNOSIS — R07.9 CHEST PAIN, UNSPECIFIED TYPE: ICD-10-CM

## 2023-03-28 DIAGNOSIS — I63.9 ACUTE CEREBROVASCULAR ACCIDENT (CVA) (HCC): ICD-10-CM

## 2023-03-28 DIAGNOSIS — I50.22 CHRONIC SYSTOLIC CONGESTIVE HEART FAILURE (HCC): ICD-10-CM

## 2023-03-28 DIAGNOSIS — I25.119 CORONARY ARTERY DISEASE INVOLVING NATIVE CORONARY ARTERY OF NATIVE HEART WITH ANGINA PECTORIS (HCC): ICD-10-CM

## 2023-03-28 DIAGNOSIS — E78.2 MIXED HYPERLIPIDEMIA: ICD-10-CM

## 2023-03-28 DIAGNOSIS — I48.0 PAROXYSMAL ATRIAL FIBRILLATION (HCC): ICD-10-CM

## 2023-03-28 DIAGNOSIS — I10 ESSENTIAL HYPERTENSION: ICD-10-CM

## 2023-03-28 DIAGNOSIS — E78.2 MIXED HYPERLIPIDEMIA: Primary | ICD-10-CM

## 2023-03-28 RX ORDER — SACUBITRIL AND VALSARTAN 24; 26 MG/1; MG/1
1 TABLET, FILM COATED ORAL 2 TIMES DAILY
Qty: 180 TABLET | Refills: 3 | OUTPATIENT
Start: 2023-03-28

## 2023-03-28 NOTE — TELEPHONE ENCOUNTER
Medication:   Requested Prescriptions     Pending Prescriptions Disp Refills    empagliflozin (JARDIANCE) 10 MG tablet 30 tablet 5     Sig: Take 1 tablet by mouth daily     Last Filled:  2.28.23    Last appt: 3/7/2023   Next appt: 6/7/2023    Last OARRS: No flowsheet data found.

## 2023-03-28 NOTE — TELEPHONE ENCOUNTER
Pt needs meds refill  8-Wtqorrjttib-Vowsg-N-neotetra (CULTURELLE IBS COMPLETE PO)  [facility] [address] [facility], [address] [facility] [place] -  [phone]

## 2023-03-28 NOTE — TELEPHONE ENCOUNTER
----- Message from [facility] sent at 3/28/2023  9:21 AM EDT -----  Subject: Refill Request    QUESTIONS  Name of Medication? atorvastatin (LIPITOR) 10 MG tablet  Patient-reported dosage and instructions? once a day   How many days do you have left? 0  Preferred Pharmacy? [address] phone number (if available)? [phone]  Additional Information for Provider? [facility]   ---------------------------------------------------------------------------  --------------  CALL BACK INFO  What is the best way for the office to contact you? OK to leave message on   voicemail  Preferred Call Back Phone Number? [phone]  ---------------------------------------------------------------------------  --------------  SCRIPT ANSWERS  Relationship to Patient?  Self

## 2023-03-28 NOTE — TELEPHONE ENCOUNTER
Requested Prescriptions     Pending Prescriptions Disp Refills    empagliflozin (JARDIANCE) 10 MG tablet 90 tablet 3     Sig: Take 1 tablet by mouth daily    sacubitril-valsartan (ENTRESTO) 24-26 MG per tablet 180 tablet 3     Sig: Take 1 tablet by mouth 2 times daily            Last Office Visit: 2/28/2023     Next Office Visit: 8/29/2023 Unknown

## 2023-03-29 ENCOUNTER — TELEPHONE (OUTPATIENT)
Dept: PRIMARY CARE CLINIC | Age: 88
End: 2023-03-29

## 2023-03-29 RX ORDER — SACUBITRIL AND VALSARTAN 24; 26 MG/1; MG/1
1 TABLET, FILM COATED ORAL 2 TIMES DAILY
Qty: 180 TABLET | Refills: 3 | Status: SHIPPED | OUTPATIENT
Start: 2023-03-29

## 2023-03-29 RX ORDER — B.COAGUL,SUBTILIS/INULIN/VIT C 1B CELL-1G
TABLET,CHEWABLE ORAL
Status: CANCELLED | OUTPATIENT
Start: 2023-03-29

## 2023-03-29 RX ORDER — LACTOBACILLUS RHAMNOSUS GG 10B CELL
1 CAPSULE ORAL DAILY
Qty: 30 CAPSULE | Refills: 3 | Status: SHIPPED | OUTPATIENT
Start: 2023-03-29

## 2023-03-29 RX ORDER — ATORVASTATIN CALCIUM 10 MG/1
10 TABLET, FILM COATED ORAL DAILY
Qty: 14 TABLET | Refills: 0 | Status: SHIPPED | OUTPATIENT
Start: 2023-03-29

## 2023-03-29 NOTE — TELEPHONE ENCOUNTER
[address] [facility],[address] calling   # [phone]  Fax# [phone]  *Needing a list of medications re-filled   *Faxed Outpatient Medication list to:  [phone]

## 2023-03-29 NOTE — TELEPHONE ENCOUNTER
Spoke with patient, she said she normally takes the powder packet but they were out so she was told to ask for a script for the tablets. She wants Culturelle tablets for once a day.

## 2023-03-29 NOTE — TELEPHONE ENCOUNTER
Selin Ramos following up on status of Lipitor requested yesterday - 3/28/2023    Medication Refill Request  2-week prescription requested    atorvastatin (LIPITOR) 10 MG tablet  *Patient without medication - needs a refill until [facility] delivers    Please send to:  [facility] [address] [facility], [address] [place] - [place] [phone]

## 2023-03-30 PROBLEM — R35.0 URINARY FREQUENCY: Status: ACTIVE | Noted: 2023-03-30

## 2023-03-30 PROBLEM — R39.15 URINARY URGENCY: Status: ACTIVE | Noted: 2023-03-30

## 2023-03-30 ASSESSMENT — ENCOUNTER SYMPTOMS
BLOOD IN STOOL: 0
RHINORRHEA: 0
VOMITING: 0
NAUSEA: 0
SINUS PRESSURE: 0
WHEEZING: 0
COUGH: 0
CONSTIPATION: 1
ABDOMINAL PAIN: 0
CHEST TIGHTNESS: 0
DIARRHEA: 0
SHORTNESS OF BREATH: 0
SORE THROAT: 0

## 2023-04-17 DIAGNOSIS — E78.2 MIXED HYPERLIPIDEMIA: ICD-10-CM

## 2023-04-17 RX ORDER — ATORVASTATIN CALCIUM 10 MG/1
TABLET, FILM COATED ORAL
Qty: 14 TABLET | Refills: 0 | Status: SHIPPED | OUTPATIENT
Start: 2023-04-17

## 2023-04-17 NOTE — TELEPHONE ENCOUNTER
Medication:   Requested Prescriptions     Pending Prescriptions Disp Refills    atorvastatin (LIPITOR) 10 MG tablet [Pharmacy Med Name: ATORVASTATIN 10 MG TABLET] 14 tablet 0     Sig: TAKE 1 TABLET BY MOUTH EVERY DAY     Last Filled:  3.29.23    Last appt: 3/7/2023   Next appt: 6/7/2023    Last Lipid:   Lab Results   Component Value Date/Time    CHOL 181 03/07/2023 08:34 AM    TRIG 62 03/07/2023 08:34 AM    HDL 87 03/07/2023 08:34 AM    LDLCALC 82 03/07/2023 08:34 AM

## 2023-04-20 ENCOUNTER — TELEPHONE (OUTPATIENT)
Dept: CARDIOLOGY CLINIC | Age: 88
End: 2023-04-20

## 2023-04-20 DIAGNOSIS — I50.22 CHRONIC SYSTOLIC CONGESTIVE HEART FAILURE (HCC): ICD-10-CM

## 2023-04-20 DIAGNOSIS — I10 ESSENTIAL HYPERTENSION: ICD-10-CM

## 2023-04-20 DIAGNOSIS — I25.119 CORONARY ARTERY DISEASE INVOLVING NATIVE CORONARY ARTERY OF NATIVE HEART WITH ANGINA PECTORIS (HCC): ICD-10-CM

## 2023-04-20 RX ORDER — SACUBITRIL AND VALSARTAN 24; 26 MG/1; MG/1
1 TABLET, FILM COATED ORAL 2 TIMES DAILY
Qty: 90 TABLET | Refills: 3
Start: 2023-04-20

## 2023-04-20 NOTE — TELEPHONE ENCOUNTER
The patient called stating her Su Mole was decreased to a 1/2 table. The patient states its hard to cut the table in half and she is having side effects from only taking a 1/2 tablet. Some of the side effects the patient is having are headaches, pain in the left side of her face, and dry mouth. The patient would like to go back to taking 1 whole tablet twice daily. Please call the patient back at [phone] to advise.

## 2023-04-20 NOTE — TELEPHONE ENCOUNTER
Go back to full tablet twice a day. Continue to check BP.   Even if BP somewhat low but asymptomatic continue full tablet twice a day

## 2023-04-20 NOTE — TELEPHONE ENCOUNTER
Spoke with patient will increase entresto back to a whole pill twice a day. Medication list updated. Patient will continue to monitor her BP and symptoms.

## 2023-04-20 NOTE — TELEPHONE ENCOUNTER
Patient seen by Radha Cerna NP on 4/10/23, BP at that time was 86/52 and entresto was decreased to 0.5 mg twice a day. Today's /60. Patient would like to go back to to a whole tablet. Please review.

## 2023-04-21 ENCOUNTER — TELEPHONE (OUTPATIENT)
Dept: PRIMARY CARE CLINIC | Age: 88
End: 2023-04-21

## 2023-04-21 DIAGNOSIS — H90.3 SENSORINEURAL HEARING LOSS, BILATERAL: Primary | ICD-10-CM

## 2023-04-21 NOTE — TELEPHONE ENCOUNTER
----- Message from Marie Hernandez sent at 4/20/2023  1:57 PM EDT -----  Subject: Referral Request    Reason for referral request? Pt needs referral faxed to  for audiology   to see Benjie Ledesma. Pt has been going here but they are now needing   referral sent. Please fax: [phone]. Pt has appt on Friday. Provider patient wants to be referred to(if known):     Provider Phone Number(if known):     Additional Information for Provider?   ---------------------------------------------------------------------------  --------------  [id number] [employer]    [phone]; OK to leave message on voicemail  ---------------------------------------------------------------------------  --------------

## 2023-04-28 ENCOUNTER — TELEPHONE (OUTPATIENT)
Dept: PRIMARY CARE CLINIC | Age: 88
End: 2023-04-28

## 2023-06-05 DIAGNOSIS — E89.0 POSTSURGICAL HYPOTHYROIDISM: ICD-10-CM

## 2023-06-05 RX ORDER — LEVOTHYROXINE SODIUM 75 MCG
75 TABLET ORAL DAILY
Qty: 90 TABLET | Refills: 1 | Status: SHIPPED | OUTPATIENT
Start: 2023-06-05 | End: 2023-06-07 | Stop reason: SDUPTHER

## 2023-06-05 NOTE — TELEPHONE ENCOUNTER
Medication:   Requested Prescriptions     Pending Prescriptions Disp Refills    SYNTHROID 75 MCG tablet 90 tablet 1     Sig: Take 1 tablet by mouth Daily     Last Filled:  6.4.21    Last appt: 3/7/2023   Next appt: 6/7/2023    Last Thyroid:   Lab Results   Component Value Date/Time    TSH 0.76 03/07/2023 08:34 AM

## 2023-06-07 ENCOUNTER — HOSPITAL ENCOUNTER (OUTPATIENT)
Dept: GENERAL RADIOLOGY | Age: 88
Discharge: HOME | End: 2023-06-07
Payer: MEDICARE

## 2023-06-07 DIAGNOSIS — M54.2 NECK PAIN: ICD-10-CM

## 2023-06-07 DIAGNOSIS — R10.9 ABDOMINAL PAIN, UNSPECIFIED ABDOMINAL LOCATION: ICD-10-CM

## 2023-06-07 LAB
ALBUMIN SERPL-MCNC: 4.3 G/DL (ref 3.4–5)
ALBUMIN/GLOB SERPL: 2 {RATIO} (ref 1.1–2.2)
ALP SERPL-CCNC: 77 U/L (ref 40–129)
ALT SERPL-CCNC: 12 U/L (ref 10–40)
ANION GAP SERPL CALCULATED.3IONS-SCNC: 13 MMOL/L (ref 3–16)
AST SERPL-CCNC: 24 U/L (ref 15–37)
BASOPHILS # BLD: 0 K/UL (ref 0–0.2)
BASOPHILS NFR BLD: 0.6 %
BILIRUB SERPL-MCNC: 0.7 MG/DL (ref 0–1)
BUN SERPL-MCNC: 14 MG/DL (ref 7–20)
CALCIUM SERPL-MCNC: 9.4 MG/DL (ref 8.3–10.6)
CHLORIDE SERPL-SCNC: 106 MMOL/L (ref 99–110)
CO2 SERPL-SCNC: 24 MMOL/L (ref 21–32)
CREAT SERPL-MCNC: 0.8 MG/DL (ref 0.6–1.2)
DEPRECATED RDW RBC AUTO: 15.3 % (ref 12.4–15.4)
EOSINOPHIL # BLD: 0.1 K/UL (ref 0–0.6)
EOSINOPHIL NFR BLD: 2 %
GFR SERPLBLD CREATININE-BSD FMLA CKD-EPI: >60 ML/MIN/{1.73_M2}
GLUCOSE SERPL-MCNC: 97 MG/DL (ref 70–99)
HCT VFR BLD AUTO: 41.1 % (ref 36–48)
HGB BLD-MCNC: 13.5 G/DL (ref 12–16)
LYMPHOCYTES # BLD: 1.3 K/UL (ref 1–5.1)
LYMPHOCYTES NFR BLD: 21.8 %
MCH RBC QN AUTO: 28.9 PG (ref 26–34)
MCHC RBC AUTO-ENTMCNC: 32.8 G/DL (ref 31–36)
MCV RBC AUTO: 88.2 FL (ref 80–100)
MONOCYTES # BLD: 0.7 K/UL (ref 0–1.3)
MONOCYTES NFR BLD: 11.1 %
NEUTROPHILS # BLD: 3.9 K/UL (ref 1.7–7.7)
NEUTROPHILS NFR BLD: 64.5 %
PLATELET # BLD AUTO: 164 K/UL (ref 135–450)
PMV BLD AUTO: 9.5 FL (ref 5–10.5)
POTASSIUM SERPL-SCNC: 4 MMOL/L (ref 3.5–5.1)
PROT SERPL-MCNC: 6.5 G/DL (ref 6.4–8.2)
RBC # BLD AUTO: 4.67 M/UL (ref 4–5.2)
SODIUM SERPL-SCNC: 143 MMOL/L (ref 136–145)
WBC # BLD AUTO: 6 K/UL (ref 4–11)

## 2023-06-07 PROCEDURE — 72040 X-RAY EXAM NECK SPINE 2-3 VW: CPT

## 2023-06-12 ENCOUNTER — TELEPHONE (OUTPATIENT)
Dept: CARDIOLOGY CLINIC | Age: 88
End: 2023-06-12

## 2023-06-12 NOTE — TELEPHONE ENCOUNTER
Pt called stating that she was taking Metoprolol previously while seeing a physician in [place] and was switched to Carvedilol 3.125 mg and wants to know if Becki Valencia will switch her back to Metoprolol. Pt is unsure of the dose she was taking and there is no rwcord of it in Epic. Please advise

## 2023-06-13 NOTE — TELEPHONE ENCOUNTER
Spoke with son and DIL - states 'sometimes pt gets confused'. States did discuss Metoprolol with PCP, but would prefer to stay on carvediolol until next OV w/dr castellon and discuss change at that time.

## 2023-06-18 PROBLEM — N64.4 BREAST PAIN, LEFT: Status: ACTIVE | Noted: 2023-06-18

## 2023-06-18 PROBLEM — N63.20 MASS OF LEFT BREAST: Status: ACTIVE | Noted: 2023-06-18

## 2023-06-18 PROBLEM — R10.9 ABDOMINAL PAIN: Status: ACTIVE | Noted: 2023-06-18

## 2023-06-21 DIAGNOSIS — E78.2 MIXED HYPERLIPIDEMIA: ICD-10-CM

## 2023-06-21 DIAGNOSIS — I48.0 PAROXYSMAL ATRIAL FIBRILLATION (HCC): ICD-10-CM

## 2023-06-21 DIAGNOSIS — I50.22 CHRONIC SYSTOLIC CONGESTIVE HEART FAILURE (HCC): ICD-10-CM

## 2023-06-21 DIAGNOSIS — I10 ESSENTIAL HYPERTENSION: ICD-10-CM

## 2023-06-21 DIAGNOSIS — I25.119 CORONARY ARTERY DISEASE INVOLVING NATIVE CORONARY ARTERY OF NATIVE HEART WITH ANGINA PECTORIS (HCC): ICD-10-CM

## 2023-06-21 DIAGNOSIS — R07.9 CHEST PAIN, UNSPECIFIED TYPE: ICD-10-CM

## 2023-06-21 DIAGNOSIS — I63.9 ACUTE CEREBROVASCULAR ACCIDENT (CVA) (HCC): ICD-10-CM

## 2023-06-22 ENCOUNTER — OFFICE VISIT (OUTPATIENT)
Dept: PRIMARY CARE CLINIC | Age: 88
End: 2023-06-22
Payer: MEDICARE

## 2023-06-22 VITALS
SYSTOLIC BLOOD PRESSURE: 120 MMHG | TEMPERATURE: 97.3 F | RESPIRATION RATE: 16 BRPM | OXYGEN SATURATION: 97 % | HEART RATE: 62 BPM | WEIGHT: 136 LBS | HEIGHT: 63 IN | BODY MASS INDEX: 24.1 KG/M2 | DIASTOLIC BLOOD PRESSURE: 78 MMHG

## 2023-06-22 DIAGNOSIS — M54.2 NECK PAIN: Primary | ICD-10-CM

## 2023-06-22 DIAGNOSIS — R20.0 NUMBNESS OF RIGHT ANTERIOR THIGH: ICD-10-CM

## 2023-06-22 DIAGNOSIS — M79.651 RIGHT THIGH PAIN: ICD-10-CM

## 2023-06-22 DIAGNOSIS — N64.4 BREAST PAIN, LEFT: ICD-10-CM

## 2023-06-22 PROCEDURE — G8427 DOCREV CUR MEDS BY ELIG CLIN: HCPCS | Performed by: INTERNAL MEDICINE

## 2023-06-22 PROCEDURE — 1090F PRES/ABSN URINE INCON ASSESS: CPT | Performed by: INTERNAL MEDICINE

## 2023-06-22 PROCEDURE — 99214 OFFICE O/P EST MOD 30 MIN: CPT | Performed by: INTERNAL MEDICINE

## 2023-06-22 PROCEDURE — 3074F SYST BP LT 130 MM HG: CPT | Performed by: INTERNAL MEDICINE

## 2023-06-22 PROCEDURE — 1036F TOBACCO NON-USER: CPT | Performed by: INTERNAL MEDICINE

## 2023-06-22 PROCEDURE — 1123F ACP DISCUSS/DSCN MKR DOCD: CPT | Performed by: INTERNAL MEDICINE

## 2023-06-22 PROCEDURE — 3078F DIAST BP <80 MM HG: CPT | Performed by: INTERNAL MEDICINE

## 2023-06-22 PROCEDURE — G8400 PT W/DXA NO RESULTS DOC: HCPCS | Performed by: INTERNAL MEDICINE

## 2023-06-22 PROCEDURE — G8420 CALC BMI NORM PARAMETERS: HCPCS | Performed by: INTERNAL MEDICINE

## 2023-06-22 RX ORDER — ACETAMINOPHEN 500 MG
500 TABLET ORAL 4 TIMES DAILY PRN
Qty: 120 TABLET | Refills: 1 | Status: SHIPPED | OUTPATIENT
Start: 2023-06-22

## 2023-06-25 RX ORDER — EMPAGLIFLOZIN 10 MG/1
TABLET, FILM COATED ORAL
Qty: 90 TABLET | Refills: 1 | Status: SHIPPED | OUTPATIENT
Start: 2023-06-25

## 2023-06-30 ENCOUNTER — TELEPHONE (OUTPATIENT)
Dept: CARDIOLOGY CLINIC | Age: 88
End: 2023-06-30

## 2023-06-30 DIAGNOSIS — I10 ESSENTIAL HYPERTENSION: ICD-10-CM

## 2023-06-30 DIAGNOSIS — I25.119 CORONARY ARTERY DISEASE INVOLVING NATIVE CORONARY ARTERY OF NATIVE HEART WITH ANGINA PECTORIS (HCC): ICD-10-CM

## 2023-06-30 DIAGNOSIS — I50.22 CHRONIC SYSTOLIC CONGESTIVE HEART FAILURE (HCC): ICD-10-CM

## 2023-06-30 PROBLEM — R20.0 NUMBNESS OF RIGHT ANTERIOR THIGH: Status: ACTIVE | Noted: 2023-06-30

## 2023-06-30 PROBLEM — M79.651 RIGHT THIGH PAIN: Status: ACTIVE | Noted: 2023-06-30

## 2023-06-30 RX ORDER — SACUBITRIL AND VALSARTAN 24; 26 MG/1; MG/1
1 TABLET, FILM COATED ORAL 2 TIMES DAILY
Qty: 90 TABLET | Refills: 3 | Status: SHIPPED | OUTPATIENT
Start: 2023-06-30

## 2023-06-30 ASSESSMENT — ENCOUNTER SYMPTOMS
CHEST TIGHTNESS: 0
CONSTIPATION: 0
SORE THROAT: 0
COUGH: 0
RHINORRHEA: 0
ABDOMINAL PAIN: 0
VOMITING: 0
SHORTNESS OF BREATH: 0
NAUSEA: 0
DIARRHEA: 0

## 2023-07-10 ENCOUNTER — TELEPHONE (OUTPATIENT)
Dept: PRIMARY CARE CLINIC | Age: 88
End: 2023-07-10

## 2023-07-10 NOTE — TELEPHONE ENCOUNTER
Spoke with patient son, Swathi Mcclain, who is on HIPPA release of Dr. Mary Jacinto recommendations.

## 2023-07-10 NOTE — TELEPHONE ENCOUNTER
Call patient to inform her that I received her message regarding Culturelle. I discontinued Culturelle on her medication list. She can take Miralax as needed.

## 2023-07-10 NOTE — TELEPHONE ENCOUNTER
Pt is letting the doctor know she is not taking culturelle it is not working she wanted to let the doctor know      [phone]

## 2023-07-12 DIAGNOSIS — I25.119 CORONARY ARTERY DISEASE INVOLVING NATIVE CORONARY ARTERY OF NATIVE HEART WITH ANGINA PECTORIS (HCC): ICD-10-CM

## 2023-07-12 DIAGNOSIS — I50.22 CHRONIC SYSTOLIC CONGESTIVE HEART FAILURE (HCC): ICD-10-CM

## 2023-07-12 DIAGNOSIS — I10 ESSENTIAL HYPERTENSION: ICD-10-CM

## 2023-07-13 ENCOUNTER — OFFICE VISIT (OUTPATIENT)
Dept: CARDIOLOGY CLINIC | Age: 88
End: 2023-07-13

## 2023-07-13 ENCOUNTER — NURSE ONLY (OUTPATIENT)
Dept: CARDIOLOGY CLINIC | Age: 88
End: 2023-07-13
Payer: MEDICARE

## 2023-07-13 VITALS
WEIGHT: 135.6 LBS | BODY MASS INDEX: 24.41 KG/M2 | SYSTOLIC BLOOD PRESSURE: 100 MMHG | HEART RATE: 60 BPM | DIASTOLIC BLOOD PRESSURE: 68 MMHG

## 2023-07-13 DIAGNOSIS — R07.9 CHEST PAIN, UNSPECIFIED TYPE: ICD-10-CM

## 2023-07-13 DIAGNOSIS — I50.22 CHRONIC SYSTOLIC CONGESTIVE HEART FAILURE (HCC): Primary | ICD-10-CM

## 2023-07-13 DIAGNOSIS — I48.0 PAROXYSMAL ATRIAL FIBRILLATION (HCC): ICD-10-CM

## 2023-07-13 DIAGNOSIS — Z95.810 CARDIAC DEFIBRILLATOR IN SITU: ICD-10-CM

## 2023-07-13 PROCEDURE — 93283 PRGRMG EVAL IMPLANTABLE DFB: CPT | Performed by: INTERNAL MEDICINE

## 2023-07-13 NOTE — PROGRESS NOTES
Patient comes in for programming evaluation on their [address] Patient confirmed receipt of [employer]. Discussed remote monitoring, appts and frequencies. Remote monitoring does not appear connected. All sensing and pacing parameters appears unchanged since last in office check on 04.10.2023. Programmed DDDR 60 bpm.  100% battery estimated until AGUSTINA/RRT. Underlying AsVs @ 56 bpm.  AP 32.  5%. PVC 2%  SVT noted on 05.01.2023 w/V rate of ~150 bpm.  (VT1 monitor zone)  Patient remains on entresto, furosemide, carvedilol. Atrial output lowered today. Please see interrogation for more detail. TI appears stable. Patient will see Dr. Orestes Manzo today. Patient will follow up in 3-6 months in office and/or remotely.

## 2023-07-14 ENCOUNTER — TELEPHONE (OUTPATIENT)
Dept: CARDIOLOGY CLINIC | Age: 88
End: 2023-07-14

## 2023-07-14 NOTE — TELEPHONE ENCOUNTER
This recorder contacted Dr. Evans [facility] office in [address] requesting release of [facility] Monitoring. Spoke to [employer]. Message sent to Dr. Juarez Sharma MA. Waiting on return call.

## 2023-07-18 ENCOUNTER — TELEPHONE (OUTPATIENT)
Dept: PRIMARY CARE CLINIC | Age: 88
End: 2023-07-18

## 2023-07-20 RX ORDER — SACUBITRIL AND VALSARTAN 24; 26 MG/1; MG/1
1 TABLET, FILM COATED ORAL 2 TIMES DAILY
Qty: 180 TABLET | Refills: 3 | Status: SHIPPED | OUTPATIENT
Start: 2023-07-20

## 2023-07-20 NOTE — TELEPHONE ENCOUNTER
Patient called and stated she needs a refill of her sacubitril-valsartan (ENTRESTO) 24-26 MG per tablet. She stated all of her medication can goes through [facility] except her Alonna Mu. She stated the pharmacy said they need to receive a script sent to [facility] for Alonna Mu. She wants a call back sent to her son Carolin [facility] [phone]. If you don't get him then call her at [phone].       [address] [facility], [address] [place] [address] 160 - f [phone]

## 2023-07-20 NOTE — TELEPHONE ENCOUNTER
Requested Prescriptions     Pending Prescriptions Disp Refills    sacubitril-valsartan (ENTRESTO) 24-26 MG per tablet 180 tablet 3     Sig: Take 1 tablet by mouth 2 times daily            Last Office Visit: 4/10/2023     Next Office Visit: [phone]

## 2023-07-24 ENCOUNTER — TELEPHONE (OUTPATIENT)
Dept: CARDIOLOGY CLINIC | Age: 88
End: 2023-07-24

## 2023-07-24 ENCOUNTER — OFFICE VISIT (OUTPATIENT)
Dept: ORTHOPEDIC SURGERY | Age: 88
End: 2023-07-24

## 2023-07-24 VITALS — BODY MASS INDEX: 26.5 KG/M2 | HEIGHT: 60 IN | WEIGHT: 135 LBS

## 2023-07-24 DIAGNOSIS — G89.29 CHRONIC NECK PAIN: ICD-10-CM

## 2023-07-24 DIAGNOSIS — M50.30 DDD (DEGENERATIVE DISC DISEASE), CERVICAL: ICD-10-CM

## 2023-07-24 DIAGNOSIS — M43.12 ANTEROLISTHESIS OF CERVICAL SPINE: Primary | ICD-10-CM

## 2023-07-24 DIAGNOSIS — M47.812 ARTHROPATHY OF CERVICAL FACET JOINT: ICD-10-CM

## 2023-07-24 DIAGNOSIS — M54.2 CHRONIC NECK PAIN: ICD-10-CM

## 2023-07-24 NOTE — TELEPHONE ENCOUNTER
Patient needs MRI. Needs to see if device is MRI compatible. Please place in chart-if okay and compatible for MRI. No need to call or send confirmation.

## 2023-07-24 NOTE — TELEPHONE ENCOUNTER
Patient has a [address] [facility] Rivacor 7 DR-T ICD:  B[employer]ik [address] RA lead   B[employer]ik [id number] Plexa S 60 RV lead. Complete system implanted on 02.01.2023 in [place] by Dr. Lelia Duverney.      Per [employer] ProMRI System Check website-The system is MR conditional.

## 2023-07-24 NOTE — TELEPHONE ENCOUNTER
Dwayne Man. I checked the status of this patient's HM once I returned from being out of office for a week and added her in [employer] HM website today as it appears Dr. Lindsey Johns office released her sometime while I was out. Please double check to make sure all is correct so we can start to remotely monitor patient. Thanks.

## 2023-07-24 NOTE — PROGRESS NOTES
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 Pain score 5/10    GENERAL EXAM:  General Apparence: Patient is adequately groomed with no evidence of malnutrition. Orientation: The patient is oriented to time, place and person. Mood & Affect:The patient's mood and affect are appropriate  Lymphatic: The lymphatic examination bilaterally reveals all areas to be without enlargement or induration  Sensation: Sensation is intact without deficit  CERVICAL EXAMINATION:  Inspection: Local inspection shows no step-off or bruising. Palpation: No evidence of tenderness at the midline. Positive tenderness left trap with tightness    Range of Motion: Intact flexion, moderate loss of extension, mild loss of lateral rotation  Strength: 5/5 bilateral upper extremities   Special Tests:      Spurling's, L'Hermitte's & Rivas's negative bilaterally. Skin:There are no rashes, ulcerations or lesions in right & left upper extremities. Reflexes: Bilaterally triceps, biceps and brachioradialis are 2+. Clonus absent bilaterally at the feet. Additional Examinations:         RIGHT UPPER EXTREMITY:  Inspection/examination of the right upper extremity does not show any tenderness, deformity or injury. Range of motion is full. There is no gross instability. There are no rashes, ulcerations or lesions. Strength and tone are normal.  LEFT UPPER EXTREMITY: Inspection/examination of the left upper extremity does not show any tenderness, deformity or injury. Range of motion is full. There is no gross instability. There are no rashes, ulcerations or lesions.  Strength and tone are normal.    Diagnostic Testing:

## 2023-07-25 ENCOUNTER — TELEPHONE (OUTPATIENT)
Dept: ORTHOPEDIC SURGERY | Age: 88
End: 2023-07-25

## 2023-07-25 NOTE — TELEPHONE ENCOUNTER
Patient's POA contacted regarding Cervical MRI WO CONTRAST approval and authorization is valid. Patient was notified and instructed to call to schedule at [address], [address] [facility] Zamzam Zuniga, [facility] P: [phone]    Once completed, patient was instructed on scheduling a follow up appoint to review results. TEST RESULTS WILL NOT BE GIVEN OVER THE PHONE. AN IN-OFFICE APPOINTMENT IS REQUIRED    At this time for the patient's POA, whom is on the HIPAA form. He states that the patient is does not wish to proceed with the MRI at this time. They may call back if wishing to proceed.

## 2023-07-31 ENCOUNTER — PATIENT MESSAGE (OUTPATIENT)
Dept: CARDIOLOGY CLINIC | Age: 88
End: 2023-07-31

## 2023-08-07 ASSESSMENT — ENCOUNTER SYMPTOMS
BLOOD IN STOOL: 0
VOMITING: 0
COLOR CHANGE: 0
STRIDOR: 0
TROUBLE SWALLOWING: 0
ABDOMINAL DISTENTION: 0
DIARRHEA: 0
CONSTIPATION: 0
PHOTOPHOBIA: 0
NAUSEA: 0
BACK PAIN: 0
CHEST TIGHTNESS: 0
ABDOMINAL PAIN: 0
EYE PAIN: 0
APNEA: 0
WHEEZING: 0

## 2023-08-07 NOTE — PROGRESS NOTES
leaflet with mild mitral regurgitation   Aortic sclerosis with mild aortic insufficiency   Trace tricuspid regurgitation      6/24/21   Summary   Limited echo, ECG not available during study, however, there appears to be   significant ectopy during image acquisition. Overall left ventricular   systolic function appears globally reduced. Ejection fraction is visually   estimated to be 35-40%. IVC size is dilated (>2.1 cm) and collapses < 50% with respiration   consistent with elevated RA pressure (15 mmHg). 3/30/20  Summary   Normal left ventricular size. Mild concentric left ventricular hypertrophy. Mildly decreased left ventricular systolic function with an estimated   ejection fraction of 45%-50%. Mild global hypokinesis. Grade I diastolic dysfunction with normal LV filling pressures. The left atrium is mildly dilated. Estimated pulmonary artery systolic pressure is at 25 mmHg assuming a right   atrial pressure of 3 mmHg. A bubble study was performed and fails to show evidence of shunting. Last CMR  (if available):      Assessment / Plan:     Chronic systolic congestive heart failure ([address])  Compensated, doing well with Entresto, jardiance, coreg and aldactone  Compensated. ICD in place    Cardiac defibrillator in situ   Working properly    CAD (coronary artery disease)  Non obs  Continue lipitor  No symptoms. Mixed hyperlipidemia   Lipitor, no side effects    Essential hypertension   Controlled on Entresto, coreg. Follow up in 6 months. I had the opportunity to review the clinical symptoms and presentation of Rafy Olguin. Patient's allergies and medications were reviewed and updated. Patient's past medical, surgical, social and family history were reviewed and updated. Patient's testing including laboratory, ECGs, monitor, imaging (TTE,NEVIN,CMR,cath) were reviewed. All questions and concerns were addressed to the patient/family.  Alternatives to my treatment were

## 2023-08-29 ENCOUNTER — OFFICE VISIT (OUTPATIENT)
Dept: CARDIOLOGY CLINIC | Age: 88
End: 2023-08-29
Payer: MEDICARE

## 2023-08-29 VITALS
BODY MASS INDEX: 23.39 KG/M2 | OXYGEN SATURATION: 96 % | HEIGHT: 64 IN | SYSTOLIC BLOOD PRESSURE: 106 MMHG | WEIGHT: 137 LBS | DIASTOLIC BLOOD PRESSURE: 62 MMHG | HEART RATE: 69 BPM

## 2023-08-29 DIAGNOSIS — I10 ESSENTIAL HYPERTENSION: ICD-10-CM

## 2023-08-29 DIAGNOSIS — I50.22 CHRONIC SYSTOLIC CONGESTIVE HEART FAILURE (HCC): ICD-10-CM

## 2023-08-29 DIAGNOSIS — E78.2 MIXED HYPERLIPIDEMIA: Chronic | ICD-10-CM

## 2023-08-29 DIAGNOSIS — I25.119 CORONARY ARTERY DISEASE INVOLVING NATIVE CORONARY ARTERY OF NATIVE HEART WITH ANGINA PECTORIS (HCC): ICD-10-CM

## 2023-08-29 DIAGNOSIS — Z95.810 CARDIAC DEFIBRILLATOR IN SITU: ICD-10-CM

## 2023-08-29 PROCEDURE — G8427 DOCREV CUR MEDS BY ELIG CLIN: HCPCS | Performed by: INTERNAL MEDICINE

## 2023-08-29 PROCEDURE — 99214 OFFICE O/P EST MOD 30 MIN: CPT | Performed by: INTERNAL MEDICINE

## 2023-08-29 PROCEDURE — 1090F PRES/ABSN URINE INCON ASSESS: CPT | Performed by: INTERNAL MEDICINE

## 2023-08-29 PROCEDURE — G8420 CALC BMI NORM PARAMETERS: HCPCS | Performed by: INTERNAL MEDICINE

## 2023-08-29 PROCEDURE — 1123F ACP DISCUSS/DSCN MKR DOCD: CPT | Performed by: INTERNAL MEDICINE

## 2023-08-29 PROCEDURE — 1036F TOBACCO NON-USER: CPT | Performed by: INTERNAL MEDICINE

## 2023-09-25 DIAGNOSIS — I25.119 CORONARY ARTERY DISEASE INVOLVING NATIVE CORONARY ARTERY OF NATIVE HEART WITH ANGINA PECTORIS (HCC): ICD-10-CM

## 2023-09-25 DIAGNOSIS — I10 ESSENTIAL HYPERTENSION: ICD-10-CM

## 2023-09-25 DIAGNOSIS — E78.2 MIXED HYPERLIPIDEMIA: ICD-10-CM

## 2023-09-25 DIAGNOSIS — I50.22 CHRONIC SYSTOLIC CONGESTIVE HEART FAILURE (HCC): ICD-10-CM

## 2023-09-25 DIAGNOSIS — I48.0 PAROXYSMAL ATRIAL FIBRILLATION (HCC): ICD-10-CM

## 2023-09-25 DIAGNOSIS — R07.9 CHEST PAIN, UNSPECIFIED TYPE: ICD-10-CM

## 2023-09-25 DIAGNOSIS — I63.9 ACUTE CEREBROVASCULAR ACCIDENT (CVA) (HCC): ICD-10-CM

## 2023-09-25 RX ORDER — SACUBITRIL AND VALSARTAN 24; 26 MG/1; MG/1
1 TABLET, FILM COATED ORAL 2 TIMES DAILY
Qty: 180 TABLET | Refills: 3 | Status: SHIPPED | OUTPATIENT
Start: 2023-09-25

## 2023-09-25 NOTE — TELEPHONE ENCOUNTER
Requested Prescriptions     Pending Prescriptions Disp Refills    sacubitril-valsartan (ENTRESTO) 24-26 MG per tablet 180 tablet 3     Sig: Take 1 tablet by mouth 2 times daily    empagliflozin (JARDIANCE) 10 MG tablet 90 tablet 3     Sig: Take 1 tablet by mouth daily        Last Office Visit: 7/13/2023     Next Office Visit: 12/6/2023

## 2023-10-03 DIAGNOSIS — E78.2 MIXED HYPERLIPIDEMIA: ICD-10-CM

## 2023-10-03 RX ORDER — ATORVASTATIN CALCIUM 10 MG/1
10 TABLET, FILM COATED ORAL DAILY
Qty: 90 TABLET | Refills: 0 | Status: SHIPPED | OUTPATIENT
Start: 2023-10-03

## 2023-10-03 NOTE — TELEPHONE ENCOUNTER
Medication:   Requested Prescriptions     Pending Prescriptions Disp Refills    atorvastatin (LIPITOR) 10 MG tablet 90 tablet 0     Sig: Take 1 tablet by mouth daily     Last Filled:  4.17.23    Last appt: 6/7/2023   Next appt: 10/23/2023    Last Lipid:   Lab Results   Component Value Date/Time    CHOL 181 03/07/2023 08:34 AM    TRIG 62 03/07/2023 08:34 AM    HDL 87 03/07/2023 08:34 AM    LDLCALC 82 03/07/2023 08:34 AM
detailed exam

## 2023-10-12 ENCOUNTER — TELEPHONE (OUTPATIENT)
Dept: PRIMARY CARE CLINIC | Age: 88
End: 2023-10-12

## 2023-10-12 DIAGNOSIS — E78.2 MIXED HYPERLIPIDEMIA: ICD-10-CM

## 2023-10-12 NOTE — TELEPHONE ENCOUNTER
----- Message from David Payson sent at 10/12/2023 12:53 PM EDT -----  Subject: Medication Problem    Medication: atorvastatin (LIPITOR) 10 MG tablet  Dosage: 10 MG once a day  Ordering Provider: sergo    Question/Problem: Patient states she has been having a hard time getting   this medication from [facility]. She states when she spoke with   [place] she was notified that a PA was needed to be done and that   they had sent over multiple requests with no answer. She now has no   medication left. They also gave her a number for her the office to call to   get this PA handled, [phone].       Pharmacy: [address] [place] [address] [phone] [facility] Number [phone]    ---------------------------------------------------------------------------  --------------  Pham ESPINO  [phone]; OK to leave message on voicemail  ---------------------------------------------------------------------------  --------------    SCRIPT ANSWERS  Relationship to Patient: Self

## 2023-10-13 ENCOUNTER — TELEPHONE (OUTPATIENT)
Dept: ADMINISTRATIVE | Age: 88
End: 2023-10-13

## 2023-10-13 ENCOUNTER — TELEPHONE (OUTPATIENT)
Dept: PRIMARY CARE CLINIC | Age: 88
End: 2023-10-13

## 2023-10-13 RX ORDER — ATORVASTATIN CALCIUM 10 MG/1
10 TABLET, FILM COATED ORAL DAILY
Qty: 90 TABLET | Refills: 0 | Status: CANCELLED | OUTPATIENT
Start: 2023-10-13

## 2023-10-13 NOTE — TELEPHONE ENCOUNTER
SUBMITTED PA FOR Lipitor 10MG tablets VIA CMM  Key: [id number]  STATUS APPROVED Status:Approved; Review Type:Prior Auth; Coverage Start Date:09/13/2023; Coverage End Date:10/12/2024;      FOLLOW UP DONE DAILY: IF NO RESPONSE IN 3 DAYS WE WILL REFAX FOR STATUS CHECK. IF ANOTHER 3 DAYS GOES BY WITH NO RESPONSE WILL CALL INSURANCE FOR STATUS.

## 2023-10-13 NOTE — TELEPHONE ENCOUNTER
----- Message from Colby Agrawal sent at 10/12/2023 12:53 PM EDT -----  Subject: Medication Problem    Medication: atorvastatin (LIPITOR) 10 MG tablet  Dosage: 10 MG once a day  Ordering Provider: sergo    Question/Problem: Patient states she has been having a hard time getting   this medication from [facility] [employer] [facility]. She states when she spoke with   [facility] she was notified that a PA was needed to be done and that   they had sent over multiple requests with no answer. She now has no   medication left. They also gave her a number for her the office to call to   get this PA handled, [phone].       Pharmacy: [address] [place] [address] [phone] [facility] [phone]    ---------------------------------------------------------------------------  --------------  [facility] INFO  [phone]; OK to leave message on voicemail  ---------------------------------------------------------------------------  --------------    SCRIPT ANSWERS  Relationship to Patient: Self

## 2023-10-13 NOTE — TELEPHONE ENCOUNTER
SUBMITTED PA FOR amLODIPine-Atorvastatin 10-10MG tablets VIA CM Key: [id number] STATUS SENT. Drug is covered by current benefit plan. No further PA activity needed    PLEASE CONTACT PATIENT WITH THIS DECISION. Thank you! FOLLOW UP DONE DAILY: IF NO RESPONSE IN 3 DAYS WE WILL REFAX FOR STATUS CHECK. IF ANOTHER 3 DAYS GOES BY WITH NO RESPONSE WILL CALL INSURANCE FOR STATUS.

## 2023-10-23 ENCOUNTER — OFFICE VISIT (OUTPATIENT)
Dept: PRIMARY CARE CLINIC | Age: 88
End: 2023-10-23
Payer: MEDICARE

## 2023-10-23 VITALS
SYSTOLIC BLOOD PRESSURE: 110 MMHG | HEART RATE: 67 BPM | HEIGHT: 64 IN | OXYGEN SATURATION: 96 % | TEMPERATURE: 97.7 F | WEIGHT: 138.4 LBS | BODY MASS INDEX: 23.63 KG/M2 | DIASTOLIC BLOOD PRESSURE: 82 MMHG

## 2023-10-23 DIAGNOSIS — Z23 NEED FOR PNEUMOCOCCAL VACCINE: ICD-10-CM

## 2023-10-23 DIAGNOSIS — M85.80 OSTEOPENIA, UNSPECIFIED LOCATION: ICD-10-CM

## 2023-10-23 DIAGNOSIS — M54.50 RIGHT-SIDED LOW BACK PAIN WITHOUT SCIATICA, UNSPECIFIED CHRONICITY: ICD-10-CM

## 2023-10-23 DIAGNOSIS — E78.2 MIXED HYPERLIPIDEMIA: ICD-10-CM

## 2023-10-23 DIAGNOSIS — E89.0 POSTSURGICAL HYPOTHYROIDISM: ICD-10-CM

## 2023-10-23 DIAGNOSIS — E55.9 VITAMIN D DEFICIENCY: ICD-10-CM

## 2023-10-23 DIAGNOSIS — I10 ESSENTIAL HYPERTENSION: Primary | ICD-10-CM

## 2023-10-23 PROCEDURE — 99214 OFFICE O/P EST MOD 30 MIN: CPT | Performed by: INTERNAL MEDICINE

## 2023-10-23 PROCEDURE — 1036F TOBACCO NON-USER: CPT | Performed by: INTERNAL MEDICINE

## 2023-10-23 PROCEDURE — G0009 ADMIN PNEUMOCOCCAL VACCINE: HCPCS | Performed by: INTERNAL MEDICINE

## 2023-10-23 PROCEDURE — G8427 DOCREV CUR MEDS BY ELIG CLIN: HCPCS | Performed by: INTERNAL MEDICINE

## 2023-10-23 PROCEDURE — 1123F ACP DISCUSS/DSCN MKR DOCD: CPT | Performed by: INTERNAL MEDICINE

## 2023-10-23 PROCEDURE — G8484 FLU IMMUNIZE NO ADMIN: HCPCS | Performed by: INTERNAL MEDICINE

## 2023-10-23 PROCEDURE — G8420 CALC BMI NORM PARAMETERS: HCPCS | Performed by: INTERNAL MEDICINE

## 2023-10-23 PROCEDURE — 90677 PCV20 VACCINE IM: CPT | Performed by: INTERNAL MEDICINE

## 2023-10-23 PROCEDURE — 1090F PRES/ABSN URINE INCON ASSESS: CPT | Performed by: INTERNAL MEDICINE

## 2023-10-23 RX ORDER — ATORVASTATIN CALCIUM 10 MG/1
10 TABLET, FILM COATED ORAL DAILY
Qty: 90 TABLET | Refills: 0 | Status: SHIPPED | OUTPATIENT
Start: 2023-10-23

## 2023-10-23 NOTE — PROGRESS NOTES
1.1 - 2.2 Final    Total Bilirubin 03/07/2023 0.7  0.0 - 1.0 mg/dL Final    Alkaline Phosphatase 03/07/2023 78  40 - 129 U/L Final    ALT 03/07/2023 8 (L)  10 - 40 U/L Final    AST 03/07/2023 17  15 - 37 U/L Final    Pro-BNP 03/07/2023 652 (H)  0 - 449 pg/mL Final             Medications, Allergies, Social history, Medical history, and results reviewed      An electronic signature was used to authenticate this note.     Jevon Velasquez MD

## 2023-10-27 ENCOUNTER — TELEPHONE (OUTPATIENT)
Dept: CARDIOLOGY CLINIC | Age: 88
End: 2023-10-27

## 2023-10-27 NOTE — TELEPHONE ENCOUNTER
Unable to access previous encounter. Pt called regarding medications. Per Dr. Lawson Stein, okay to switch Carvedilol to Metoprolol XL 25 mg once daily. Script sent to pharmacy. Spoke with Virlinda Halsted (son) per HIPAA  as well as DIL and informed them, both verbalized understanding.

## 2023-10-30 DIAGNOSIS — I10 ESSENTIAL HYPERTENSION: Primary | ICD-10-CM

## 2023-10-30 DIAGNOSIS — I25.119 CORONARY ARTERY DISEASE INVOLVING NATIVE CORONARY ARTERY OF NATIVE HEART WITH ANGINA PECTORIS (HCC): ICD-10-CM

## 2023-10-30 RX ORDER — METOPROLOL SUCCINATE 25 MG/1
25 TABLET, EXTENDED RELEASE ORAL DAILY
Qty: 90 TABLET | Refills: 3 | Status: SHIPPED | OUTPATIENT
Start: 2023-10-30

## 2023-11-07 PROBLEM — E55.9 VITAMIN D DEFICIENCY: Status: ACTIVE | Noted: 2023-11-07

## 2023-11-07 PROBLEM — M54.50 RIGHT-SIDED LOW BACK PAIN WITHOUT SCIATICA: Status: ACTIVE | Noted: 2023-11-07

## 2023-11-07 PROBLEM — M85.80 OSTEOPENIA: Status: ACTIVE | Noted: 2023-11-07

## 2023-11-07 ASSESSMENT — ENCOUNTER SYMPTOMS
DIARRHEA: 0
CHEST TIGHTNESS: 0
CONSTIPATION: 1
VOMITING: 0
TROUBLE SWALLOWING: 0
SORE THROAT: 0
BACK PAIN: 1
COUGH: 0
SHORTNESS OF BREATH: 0
ABDOMINAL PAIN: 0
RHINORRHEA: 0
NAUSEA: 0

## 2023-11-20 DIAGNOSIS — M54.50 RIGHT-SIDED LOW BACK PAIN WITHOUT SCIATICA, UNSPECIFIED CHRONICITY: ICD-10-CM

## 2023-11-20 DIAGNOSIS — E55.9 VITAMIN D DEFICIENCY: ICD-10-CM

## 2023-11-20 DIAGNOSIS — I10 ESSENTIAL HYPERTENSION: ICD-10-CM

## 2023-11-20 DIAGNOSIS — E89.0 POSTSURGICAL HYPOTHYROIDISM: ICD-10-CM

## 2023-11-20 DIAGNOSIS — E78.2 MIXED HYPERLIPIDEMIA: ICD-10-CM

## 2023-11-20 LAB
25(OH)D3 SERPL-MCNC: 47.7 NG/ML
ALBUMIN SERPL-MCNC: 4.1 G/DL (ref 3.4–5)
ALBUMIN/GLOB SERPL: 2 {RATIO} (ref 1.1–2.2)
ALP SERPL-CCNC: 73 U/L (ref 40–129)
ALT SERPL-CCNC: 9 U/L (ref 10–40)
ANION GAP SERPL CALCULATED.3IONS-SCNC: 8 MMOL/L (ref 3–16)
AST SERPL-CCNC: 19 U/L (ref 15–37)
BACTERIA URNS QL MICRO: NORMAL /HPF
BILIRUB SERPL-MCNC: 0.5 MG/DL (ref 0–1)
BILIRUB UR QL STRIP.AUTO: NEGATIVE
BUN SERPL-MCNC: 15 MG/DL (ref 7–20)
CALCIUM SERPL-MCNC: 10 MG/DL (ref 8.3–10.6)
CHLORIDE SERPL-SCNC: 107 MMOL/L (ref 99–110)
CHOLEST SERPL-MCNC: 169 MG/DL (ref 0–199)
CLARITY UR: CLEAR
CO2 SERPL-SCNC: 28 MMOL/L (ref 21–32)
COLOR UR: YELLOW
CREAT SERPL-MCNC: 0.8 MG/DL (ref 0.6–1.2)
EPI CELLS #/AREA URNS AUTO: 1 /HPF (ref 0–5)
GFR SERPLBLD CREATININE-BSD FMLA CKD-EPI: >60 ML/MIN/{1.73_M2}
GLUCOSE SERPL-MCNC: 83 MG/DL (ref 70–99)
GLUCOSE UR STRIP.AUTO-MCNC: >=1000 MG/DL
HDLC SERPL-MCNC: 85 MG/DL (ref 40–60)
HGB UR QL STRIP.AUTO: ABNORMAL
HYALINE CASTS #/AREA URNS AUTO: 0 /LPF (ref 0–8)
KETONES UR STRIP.AUTO-MCNC: NEGATIVE MG/DL
LDLC SERPL CALC-MCNC: 76 MG/DL
LEUKOCYTE ESTERASE UR QL STRIP.AUTO: NEGATIVE
NITRITE UR QL STRIP.AUTO: NEGATIVE
PH UR STRIP.AUTO: 5.5 [PH] (ref 5–8)
POTASSIUM SERPL-SCNC: 4.4 MMOL/L (ref 3.5–5.1)
PROT SERPL-MCNC: 6.2 G/DL (ref 6.4–8.2)
PROT UR STRIP.AUTO-MCNC: NEGATIVE MG/DL
RBC CLUMPS #/AREA URNS AUTO: 1 /HPF (ref 0–4)
SODIUM SERPL-SCNC: 143 MMOL/L (ref 136–145)
SP GR UR STRIP.AUTO: 1.02 (ref 1–1.03)
TRIGL SERPL-MCNC: 42 MG/DL (ref 0–150)
TSH SERPL DL<=0.005 MIU/L-ACNC: 2.79 UIU/ML (ref 0.27–4.2)
UA DIPSTICK W REFLEX MICRO PNL UR: YES
URN SPEC COLLECT METH UR: ABNORMAL
UROBILINOGEN UR STRIP-ACNC: 0.2 E.U./DL
VLDLC SERPL CALC-MCNC: 8 MG/DL
WBC #/AREA URNS AUTO: 1 /HPF (ref 0–5)

## 2023-12-05 ENCOUNTER — TELEPHONE (OUTPATIENT)
Dept: PRIMARY CARE CLINIC | Age: 88
End: 2023-12-05

## 2023-12-05 DIAGNOSIS — R25.2 MUSCLE CRAMPS: Primary | ICD-10-CM

## 2023-12-05 NOTE — TELEPHONE ENCOUNTER
----- Message from Silvia Lam sent at 12/5/2023 12:59 PM EST -----  Subject: Message to Provider    QUESTIONS  Information for Provider? Pt. called and states she has leg muscle cramps. She would like to know what dosage of Potassium pills she should take to   help. Please call her.  ---------------------------------------------------------------------------  --------------  Jasiel Ortega INFO  [phone]; Do not leave any message, patient will call back for answer  ---------------------------------------------------------------------------  --------------  SCRIPT ANSWERS  Relationship to Patient?  Self

## 2023-12-06 RX ORDER — FUROSEMIDE 20 MG/1
TABLET ORAL
Qty: 12 TABLET | Refills: 10 | OUTPATIENT
Start: 2023-12-06

## 2023-12-06 NOTE — TELEPHONE ENCOUNTER
Medication:   Requested Prescriptions     Pending Prescriptions Disp Refills    furosemide (LASIX) 20 MG tablet [Pharmacy Med Name: FUROSEMIDE 20MG TABLET] 12 tablet 10     Sig: TAKE 1 TABLET BY MOUTH DAILY EVERY MONDAY , [address] [facility] [address] (DX: _________)     Last Filled:      Last appt: 10/23/2023   Next appt: 1/23/2024    Last OARRS:        No data to display

## 2023-12-06 NOTE — TELEPHONE ENCOUNTER
Medication:   Requested Prescriptions     Pending Prescriptions Disp Refills    furosemide (LASIX) 20 MG tablet [Pharmacy Med Name: FUROSEMIDE 20MG TABLET] 13 tablet 10     Sig: TAKE 1 TABLET BY MOUTH DAILY EVERY MONDAY , [address] [facility] [address] (DX: _________)     Last Filled:  02/28/2023    Last appt: 10/23/2023   Next appt: 1/23/2024    Last OARRS:        No data to display

## 2023-12-07 RX ORDER — POTASSIUM CHLORIDE 20 MEQ/1
20 TABLET, EXTENDED RELEASE ORAL DAILY
Qty: 7 TABLET | Refills: 0 | Status: SHIPPED | OUTPATIENT
Start: 2023-12-07

## 2023-12-07 NOTE — TELEPHONE ENCOUNTER
Spoke with patient son, Duglas kaba per HIPAA, he will relay the information and recommendations from Dr. Darron Velazquez. Then  script and give to patient.

## 2023-12-07 NOTE — TELEPHONE ENCOUNTER
Please call and advise patient that she used to be on potassium pills but not recently, her last comprehensive metabolic panel on 84/38 was normal potassium, hence taking too much potassium can and increase the potassium to a much higher level which is a risk for arrhythmias    I will send her potassium tablets once a day only for 7 days (since Dr. Josephus Kussmaul is out )as I am not sure if he needs to take potassium supplements at this time or not    She will need to wait for Dr. Josephus Kussmaul to return before any further refills    Dr. Toy Arshad

## 2023-12-07 NOTE — TELEPHONE ENCOUNTER
Pt called to get meds refill for-  potassium chloride (KLOR-CON M) 20 MEQ extended release tablet [[id number]]  She states that she spoke with  But have not heard anything back yet. Her cramps are getting bad.  Please contact pt. [phone]

## 2023-12-11 RX ORDER — FUROSEMIDE 20 MG/1
TABLET ORAL
Qty: 13 TABLET | Refills: 10 | Status: SHIPPED | OUTPATIENT
Start: 2023-12-11

## 2023-12-28 PROCEDURE — 93295 DEV INTERROG REMOTE 1/2/MLT: CPT | Performed by: INTERNAL MEDICINE

## 2023-12-28 PROCEDURE — 93296 REM INTERROG EVL PM/IDS: CPT | Performed by: INTERNAL MEDICINE

## 2024-01-10 ENCOUNTER — TELEPHONE (OUTPATIENT)
Dept: CARDIOLOGY CLINIC | Age: 89
End: 2024-01-10

## 2024-01-10 NOTE — TELEPHONE ENCOUNTER
Remote monitor alert received d/t \"no messages received for at least 7 days.\" Please reach out to patient to check [employer] remote monitor and connections. If she is having problems with the connection or machine, she can reach [employer] Support at [phone]. Thanks!

## 2024-01-12 NOTE — TELEPHONE ENCOUNTER
Advised the patient's son of the previous message and he will check the monitor to make sure it's plugged in properly and [employer] Support's number given.

## 2024-01-18 ENCOUNTER — TELEPHONE (OUTPATIENT)
Dept: PRIMARY CARE CLINIC | Age: 89
End: 2024-01-18

## 2024-01-18 NOTE — TELEPHONE ENCOUNTER
----- Message from Claudia Chaudhry sent at 1/18/2024  3:28 PM EST -----  Subject: Message to Provider    QUESTIONS  Information for Provider? Karan called from [facility]   and needs clarification on pt lasix ,should pt being them and if she is   what is the instructions ,prn or routine please call with information asap   call #[phone] ask for karan  ---------------------------------------------------------------------------  --------------  CALL BACK INFO  [phone]; OK to leave message on voicemail  ---------------------------------------------------------------------------  --------------  SCRIPT ANSWERS  Relationship to Patient? Covered Entity  Covered Entity Type? Assisted Living Facility?  Representative Name? KARAN   No response to external stimuli  No spontaneous respirations  No apical heart rate  Negative papillary response to light

## 2024-01-19 DIAGNOSIS — E78.2 MIXED HYPERLIPIDEMIA: ICD-10-CM

## 2024-01-19 RX ORDER — ATORVASTATIN CALCIUM 10 MG/1
10 TABLET, FILM COATED ORAL DAILY
Qty: 90 TABLET | Refills: 0 | Status: SHIPPED | OUTPATIENT
Start: 2024-01-19

## 2024-01-19 RX ORDER — FUROSEMIDE 20 MG/1
TABLET ORAL
Qty: 13 TABLET | Refills: 10 | Status: SHIPPED | OUTPATIENT
Start: 2024-01-19

## 2024-01-19 NOTE — TELEPHONE ENCOUNTER
Requested Prescriptions     Pending Prescriptions Disp Refills    furosemide (LASIX) 20 MG tablet 13 tablet 10          Last Office Visit: 12/21/2023     Next Office Visit: 2/27/2024

## 2024-01-19 NOTE — TELEPHONE ENCOUNTER
Medication:   Requested Prescriptions     Pending Prescriptions Disp Refills    atorvastatin (LIPITOR) 10 MG tablet [Pharmacy Med Name: ATORVASTATIN 10 MG TABLET] 90 tablet 0     Sig: TAKE 1 TABLET BY MOUTH EVERY DAY     Last Filled:  10.3.23    Last appt: 10/23/2023   Next appt: 1/23/2024    Last Lipid:   Lab Results   Component Value Date/Time    CHOL 169 11/20/2023 08:31 AM    TRIG 42 11/20/2023 08:31 AM    HDL 85 11/20/2023 08:31 AM    LDLCALC 76 11/20/2023 08:31 AM

## 2024-01-19 NOTE — TELEPHONE ENCOUNTER
Patient needs medication refill     Medication  furosemide (LASIX) 20 MG tablet [3294]  furosemide (LASIX) 20 MG tablet [[id number]]    Order Details    Dose, Route, Frequency: As Directed   Dispense Quantity: 13 tablet Refills: 10          Sig: TAKE 1 TABLET BY MOUTH DAILY EVERY MONDAY , EVERY WEDNESDAY (DX: _________)   Patient taking differently: See Admin Instructions Take one tablet on Monday and FRiday           Pharmacy    [facility] [address] - P [phone] - F [phone]  [address], [facility] [address]  Phone: [phone]  Fax: [phone]     Last visit:12/21/23  Next visit:1/23/24

## 2024-01-23 ENCOUNTER — OFFICE VISIT (OUTPATIENT)
Dept: PRIMARY CARE CLINIC | Age: 89
End: 2024-01-23
Payer: MEDICARE

## 2024-01-23 VITALS
DIASTOLIC BLOOD PRESSURE: 74 MMHG | WEIGHT: 139.4 LBS | HEART RATE: 84 BPM | BODY MASS INDEX: 23.93 KG/M2 | SYSTOLIC BLOOD PRESSURE: 122 MMHG | OXYGEN SATURATION: 97 %

## 2024-01-23 DIAGNOSIS — I10 ESSENTIAL HYPERTENSION: Primary | ICD-10-CM

## 2024-01-23 DIAGNOSIS — R51.9 NONINTRACTABLE HEADACHE, UNSPECIFIED CHRONICITY PATTERN, UNSPECIFIED HEADACHE TYPE: ICD-10-CM

## 2024-01-23 DIAGNOSIS — E89.0 POSTSURGICAL HYPOTHYROIDISM: ICD-10-CM

## 2024-01-23 DIAGNOSIS — E78.2 MIXED HYPERLIPIDEMIA: ICD-10-CM

## 2024-01-23 DIAGNOSIS — J31.0 RHINITIS, UNSPECIFIED TYPE: ICD-10-CM

## 2024-01-23 DIAGNOSIS — E55.9 VITAMIN D DEFICIENCY: ICD-10-CM

## 2024-01-23 PROCEDURE — 1036F TOBACCO NON-USER: CPT | Performed by: INTERNAL MEDICINE

## 2024-01-23 PROCEDURE — G8484 FLU IMMUNIZE NO ADMIN: HCPCS | Performed by: INTERNAL MEDICINE

## 2024-01-23 PROCEDURE — G8420 CALC BMI NORM PARAMETERS: HCPCS | Performed by: INTERNAL MEDICINE

## 2024-01-23 PROCEDURE — 1090F PRES/ABSN URINE INCON ASSESS: CPT | Performed by: INTERNAL MEDICINE

## 2024-01-23 PROCEDURE — 99214 OFFICE O/P EST MOD 30 MIN: CPT | Performed by: INTERNAL MEDICINE

## 2024-01-23 PROCEDURE — 1123F ACP DISCUSS/DSCN MKR DOCD: CPT | Performed by: INTERNAL MEDICINE

## 2024-01-23 PROCEDURE — G8427 DOCREV CUR MEDS BY ELIG CLIN: HCPCS | Performed by: INTERNAL MEDICINE

## 2024-01-23 ASSESSMENT — PATIENT HEALTH QUESTIONNAIRE - PHQ9
SUM OF ALL RESPONSES TO PHQ QUESTIONS 1-9: 0
6. FEELING BAD ABOUT YOURSELF - OR THAT YOU ARE A FAILURE OR HAVE LET YOURSELF OR YOUR FAMILY DOWN: 0
SUM OF ALL RESPONSES TO PHQ QUESTIONS 1-9: 0
10. IF YOU CHECKED OFF ANY PROBLEMS, HOW DIFFICULT HAVE THESE PROBLEMS MADE IT FOR YOU TO DO YOUR WORK, TAKE CARE OF THINGS AT HOME, OR GET ALONG WITH OTHER PEOPLE: 0
5. POOR APPETITE OR OVEREATING: 0
3. TROUBLE FALLING OR STAYING ASLEEP: 0
1. LITTLE INTEREST OR PLEASURE IN DOING THINGS: 0
8. MOVING OR SPEAKING SO SLOWLY THAT OTHER PEOPLE COULD HAVE NOTICED. OR THE OPPOSITE, BEING SO FIGETY OR RESTLESS THAT YOU HAVE BEEN MOVING AROUND A LOT MORE THAN USUAL: 0
SUM OF ALL RESPONSES TO PHQ9 QUESTIONS 1 & 2: 0
9. THOUGHTS THAT YOU WOULD BE BETTER OFF DEAD, OR OF HURTING YOURSELF: 0
7. TROUBLE CONCENTRATING ON THINGS, SUCH AS READING THE NEWSPAPER OR WATCHING TELEVISION: 0
4. FEELING TIRED OR HAVING LITTLE ENERGY: 0
2. FEELING DOWN, DEPRESSED OR HOPELESS: 0
SUM OF ALL RESPONSES TO PHQ QUESTIONS 1-9: 0
SUM OF ALL RESPONSES TO PHQ QUESTIONS 1-9: 0

## 2024-01-23 NOTE — PROGRESS NOTES
Date of Visit: 2024    Filomena Appiah (:  1935) is a 88 y.o. female,  Established patient here for evaluation of the following chief complaint(s):  Hypertension, Cholesterol Problem, Hypothyroidism, Vitamin D deficiency, Head pain  (When she abducts her extremities she feels a shooting pain in her head most predominantly on the right side ), Nasal drainage, and Medication questions (Patient wants to administer her own baby asa and vitamin d3 she states she doesn't get them from the nurses at her facility.)      ASSESSMENT/PLAN:    1. Essential hypertension  -stable  -Continue metoprolol ER 25 mg once daily  -Continue furosemide 20 mg 2 times weekly  -Continue spironolactone 25mg once daily  -Low sodium diet    2. Mixed hyperlipidemia  -Stable  -Continue atorvastatin 10 mg once daily  -Low-fat, low-cholesterol diet    3. Postsurgical hypothyroidism  -Stable  -Continue Synthroid 75 mcg once daily    4. Vitamin D deficiency  -Stable  -Continue vitamin D 2000 IU once daily  -Patient given letter to be able to take vitamin D on her own instead of being administered to her by her facility    5. Nonintractable headache, unspecified chronicity pattern, unspecified headache type  - CT HEAD WO CONTRAST; Future  -Tylenol 650 mg 3 times daily as needed    6. Rhinitis, unspecified type  -Not controlled  -Start loratadine (CLARITIN) 5 MG chewable tablet; Take 1 tablet by mouth daily  Dispense: 30 tablet; Refill: 0          Return in about 3 weeks (around 2024) for headache and rhinitis.    SUBJECTIVE:    Patient has hypertension. Patient takes metoprolol ER 25 mg once daily, furosemide 20 mg 2 times weekly, and Spironolactone 25mg once daily. Patient decreases salt. Patient does line dancing.     Patient has hyperlipidemia. Patient takes Atorvastatin 10mg once daily. Patient decreases fat and cholesterol.     Patient has hypothyroidism. Patient takes Synthroid 75mcg once daily. Patient has constipation and

## 2024-01-29 ENCOUNTER — TELEPHONE (OUTPATIENT)
Dept: CARDIOLOGY CLINIC | Age: 89
End: 2024-01-29

## 2024-01-29 NOTE — TELEPHONE ENCOUNTER
Patient called wanting to know if she has the name brand or generic brand of Entresto 24-26mg. Patient would like a call back at [phone]

## 2024-02-05 ENCOUNTER — TELEPHONE (OUTPATIENT)
Dept: PRIMARY CARE CLINIC | Age: 89
End: 2024-02-05

## 2024-02-05 NOTE — TELEPHONE ENCOUNTER
Pt wants  know if she should have her ct with contrast since she is having pain now , pt has a CT scheduled for tomorrow please advice .CT HEAD WO CONTRAST [[id number]] (Order [id number])

## 2024-02-06 ENCOUNTER — HOSPITAL ENCOUNTER (OUTPATIENT)
Dept: CT IMAGING | Age: 89
Discharge: HOME | End: 2024-02-06
Payer: MEDICARE

## 2024-02-06 DIAGNOSIS — R51.9 NONINTRACTABLE HEADACHE, UNSPECIFIED CHRONICITY PATTERN, UNSPECIFIED HEADACHE TYPE: ICD-10-CM

## 2024-02-06 PROBLEM — J31.0 RHINITIS: Status: ACTIVE | Noted: 2024-02-06

## 2024-02-06 PROCEDURE — 70450 CT HEAD/BRAIN W/O DYE: CPT

## 2024-02-06 ASSESSMENT — ENCOUNTER SYMPTOMS
SORE THROAT: 0
ABDOMINAL PAIN: 0
RHINORRHEA: 1
NAUSEA: 0
CHEST TIGHTNESS: 0
CONSTIPATION: 1
WHEEZING: 0
DIARRHEA: 0
SHORTNESS OF BREATH: 0
VOMITING: 0
TROUBLE SWALLOWING: 0
SINUS PAIN: 0
SINUS PRESSURE: 0
BLOOD IN STOOL: 0
COUGH: 0

## 2024-02-08 ENCOUNTER — TELEPHONE (OUTPATIENT)
Dept: PRIMARY CARE CLINIC | Age: 89
End: 2024-02-08

## 2024-02-08 DIAGNOSIS — R51.9 NONINTRACTABLE HEADACHE, UNSPECIFIED CHRONICITY PATTERN, UNSPECIFIED HEADACHE TYPE: Primary | ICD-10-CM

## 2024-02-26 ASSESSMENT — ENCOUNTER SYMPTOMS
BACK PAIN: 0
STRIDOR: 0
VOMITING: 0
ABDOMINAL DISTENTION: 0
COLOR CHANGE: 0
CONSTIPATION: 0
DIARRHEA: 0
PHOTOPHOBIA: 0
APNEA: 0
ABDOMINAL PAIN: 0
EYE PAIN: 0
WHEEZING: 0
BLOOD IN STOOL: 0
NAUSEA: 0
TROUBLE SWALLOWING: 0
CHEST TIGHTNESS: 0

## 2024-02-26 NOTE — PROGRESS NOTES
lateral/anterior walls. 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 Overall left ventricular   systolic function appears globally reduced. Ejection fraction is visually   estimated to be 35-40%.   IVC size is dilated (>2.1 cm) and collapses < 50% with respiration   consistent with elevated RA pressure (15 mmHg).    3/30/20  Summary   Normal left ventricular size.   Mild concentric left ventricular hypertrophy.   Mildly decreased left ventricular systolic function with an estimated   ejection fraction of 45%-50%.   Mild global hypokinesis.   Grade I diastolic dysfunction with normal LV filling pressures.   The left atrium is mildly dilated.   Estimated pulmonary artery systolic pressure is at 25 mmHg assuming a right   atrial pressure of 3 mmHg.   A bubble study was performed and fails to show evidence of shunting.    Last CMR  (if available):      Assessment / Plan:     Chronic systolic congestive heart failure (HCC)  Compensated, doing well on Jardiance, Entresto, metoprolol, Aldactone  Reassess LV function next year.  ICD working properly    Essential hypertension  Controlled on Entresto and metoprolol    Mixed hyperlipidemia  Continue Lipitor.  No side effects    Follow up in 6 months.    I had the opportunity to review the clinical symptoms and presentation of Filomena Appiah.     Patient's allergies and medications were reviewed and updated. Patient's past medical, surgical, social and family history were reviewed and updated.   Patient's testing including laboratory, ECGs, monitor, imaging (TTE,NEVIN,CMR,cath) were reviewed.       All questions and concerns were addressed to the patient/family. Alternatives to my treatment were

## 2024-02-27 ENCOUNTER — OFFICE VISIT (OUTPATIENT)
Dept: CARDIOLOGY CLINIC | Age: 89
End: 2024-02-27
Payer: MEDICARE

## 2024-02-27 VITALS
WEIGHT: 139.4 LBS | SYSTOLIC BLOOD PRESSURE: 104 MMHG | HEART RATE: 70 BPM | OXYGEN SATURATION: 94 % | DIASTOLIC BLOOD PRESSURE: 70 MMHG | BODY MASS INDEX: 23.93 KG/M2

## 2024-02-27 DIAGNOSIS — I50.22 CHRONIC SYSTOLIC CONGESTIVE HEART FAILURE (HCC): Primary | ICD-10-CM

## 2024-02-27 DIAGNOSIS — E78.2 MIXED HYPERLIPIDEMIA: Chronic | ICD-10-CM

## 2024-02-27 DIAGNOSIS — I10 ESSENTIAL HYPERTENSION: ICD-10-CM

## 2024-02-27 PROCEDURE — G8484 FLU IMMUNIZE NO ADMIN: HCPCS | Performed by: INTERNAL MEDICINE

## 2024-02-27 PROCEDURE — G8427 DOCREV CUR MEDS BY ELIG CLIN: HCPCS | Performed by: INTERNAL MEDICINE

## 2024-02-27 PROCEDURE — 1036F TOBACCO NON-USER: CPT | Performed by: INTERNAL MEDICINE

## 2024-02-27 PROCEDURE — 1123F ACP DISCUSS/DSCN MKR DOCD: CPT | Performed by: INTERNAL MEDICINE

## 2024-02-27 PROCEDURE — G8420 CALC BMI NORM PARAMETERS: HCPCS | Performed by: INTERNAL MEDICINE

## 2024-02-27 PROCEDURE — 99214 OFFICE O/P EST MOD 30 MIN: CPT | Performed by: INTERNAL MEDICINE

## 2024-02-27 PROCEDURE — 1090F PRES/ABSN URINE INCON ASSESS: CPT | Performed by: INTERNAL MEDICINE

## 2024-03-08 ENCOUNTER — OFFICE VISIT (OUTPATIENT)
Dept: PRIMARY CARE CLINIC | Age: 89
End: 2024-03-08
Payer: MEDICARE

## 2024-03-08 VITALS
OXYGEN SATURATION: 98 % | BODY MASS INDEX: 24.44 KG/M2 | TEMPERATURE: 97.1 F | SYSTOLIC BLOOD PRESSURE: 128 MMHG | WEIGHT: 142.4 LBS | DIASTOLIC BLOOD PRESSURE: 86 MMHG | HEART RATE: 76 BPM

## 2024-03-08 DIAGNOSIS — R43.8 BITTER TASTE: ICD-10-CM

## 2024-03-08 DIAGNOSIS — H61.23 BILATERAL IMPACTED CERUMEN: ICD-10-CM

## 2024-03-08 DIAGNOSIS — L60.0 INGROWING RIGHT GREAT TOENAIL: ICD-10-CM

## 2024-03-08 DIAGNOSIS — M54.81 OCCIPITAL NEURALGIA, UNSPECIFIED LATERALITY: Primary | ICD-10-CM

## 2024-03-08 DIAGNOSIS — L08.9 TOE INFECTION: ICD-10-CM

## 2024-03-08 PROCEDURE — G8484 FLU IMMUNIZE NO ADMIN: HCPCS | Performed by: INTERNAL MEDICINE

## 2024-03-08 PROCEDURE — 1036F TOBACCO NON-USER: CPT | Performed by: INTERNAL MEDICINE

## 2024-03-08 PROCEDURE — 1090F PRES/ABSN URINE INCON ASSESS: CPT | Performed by: INTERNAL MEDICINE

## 2024-03-08 PROCEDURE — G8427 DOCREV CUR MEDS BY ELIG CLIN: HCPCS | Performed by: INTERNAL MEDICINE

## 2024-03-08 PROCEDURE — 99214 OFFICE O/P EST MOD 30 MIN: CPT | Performed by: INTERNAL MEDICINE

## 2024-03-08 PROCEDURE — G8420 CALC BMI NORM PARAMETERS: HCPCS | Performed by: INTERNAL MEDICINE

## 2024-03-08 PROCEDURE — 1123F ACP DISCUSS/DSCN MKR DOCD: CPT | Performed by: INTERNAL MEDICINE

## 2024-03-08 RX ORDER — ATORVASTATIN CALCIUM 10 MG/1
10 TABLET, FILM COATED ORAL NIGHTLY
COMMUNITY
Start: 2024-03-08

## 2024-03-08 RX ORDER — TRAMADOL HYDROCHLORIDE 50 MG/1
50 TABLET ORAL 2 TIMES DAILY PRN
Qty: 20 TABLET | Refills: 0 | Status: SHIPPED | OUTPATIENT
Start: 2024-03-08 | End: 2024-03-18

## 2024-03-08 RX ORDER — DOXYCYCLINE HYCLATE 100 MG
100 TABLET ORAL 2 TIMES DAILY
Qty: 20 TABLET | Refills: 0 | Status: SHIPPED | OUTPATIENT
Start: 2024-03-08 | End: 2024-03-18

## 2024-03-08 SDOH — ECONOMIC STABILITY: FOOD INSECURITY: WITHIN THE PAST 12 MONTHS, YOU WORRIED THAT YOUR FOOD WOULD RUN OUT BEFORE YOU GOT MONEY TO BUY MORE.: NEVER TRUE

## 2024-03-08 SDOH — ECONOMIC STABILITY: INCOME INSECURITY: HOW HARD IS IT FOR YOU TO PAY FOR THE VERY BASICS LIKE FOOD, HOUSING, MEDICAL CARE, AND HEATING?: NOT HARD AT ALL

## 2024-03-08 SDOH — ECONOMIC STABILITY: FOOD INSECURITY: WITHIN THE PAST 12 MONTHS, THE FOOD YOU BOUGHT JUST DIDN'T LAST AND YOU DIDN'T HAVE MONEY TO GET MORE.: NEVER TRUE

## 2024-03-08 NOTE — PROGRESS NOTES
Date of Visit: 3/8/2024    Filomena Appiah (:  1935) is a 88 y.o. female,  Established patient here for evaluation of the following chief complaint(s):  Cerumen Impaction (Both ears), Toe Pain (Right foot ), and Neurologic Problem (Diagnosed with occipital neuralgia/Pt is in extreme pain and does not see pain management until 3.27.24)      ASSESSMENT/PLAN:    1. Occipital neuralgia, unspecified laterality  -Start traMADol (ULTRAM) 50 MG tablet; Take 1 tablet by mouth 2 times daily as needed for Pain for up to 10 days. Max Daily Amount: 100 mg  Dispense: 20 tablet; Refill: 0  -Tylenol ES 500mg every 6 hours as needed  -Continue care per Neurology  -Follow-up with Pain Management as scheduled 3/27/2024    2. Bilateral impacted cerumen  -Ear wax removed by Medical Assistant by soaking with peroxide for 5 minutes and then wax flushed out with an ear wash spray bottle    3. Toe infection  -Due to ingrown toenail  - doxycycline hyclate (VIBRA-TABS) 100 MG tablet; Take 1 tablet by mouth 2 times daily for 10 days  Dispense: 20 tablet; Refill: 0  -Referral to Rudi Clark DPM, Podiatry, [facility]    4. Ingrowing right great toenail  - doxycycline hyclate (VIBRA-TABS) 100 MG tablet; Take 1 tablet by mouth 2 times daily for 10 days  Dispense: 20 tablet; Refill: 0  -Referral to Rudi Clark DPM, Podiatry, [facility]    5. Bitter taste  -Change atorvastatin 10 mg to nightly since patient has bitter taste and reflux when she takes it with metoprolol during the day        Return in about 10 days (around 3/18/2024) for toe infection and occipital neuralgia.    SUBJECTIVE:    Patient has severe headaches. Patient states she can be talking and gets a shooting pain back of her head. Patient states is difficult to eat or turn her head or talk. Patient states she cannot sleep well. Patient states when she puts her head down on the pillow it causes a shooting pain and she has to find a position

## 2024-03-13 ENCOUNTER — TELEPHONE (OUTPATIENT)
Dept: CARDIOLOGY CLINIC | Age: 89
End: 2024-03-13

## 2024-03-13 NOTE — TELEPHONE ENCOUNTER
Patient called stating last time she was seen by  on 2/27/24 in office he verbally stated the patient can take Lasix 20mg when needed. No longer required to take \"1 tablet daily every Monday, every Wednesday\"  Patient would like to know if a letter or phone call could be made to the facility she is living at to let the staff know the medication can be taken as needed. Patients states the staff needs \"okay\" from doctor before any changes are made.     Patients cell: [phone]

## 2024-03-14 NOTE — TELEPHONE ENCOUNTER
The patient's son called back to provide the name and location of the nursing home    [facility]    [phone] Attn: Gayla

## 2024-03-14 NOTE — TELEPHONE ENCOUNTER
Patient's son called again. I relayed messages to him verbally and he understood and is happy with outcome. [phone]

## 2024-03-17 PROBLEM — R43.8 BITTER TASTE: Status: ACTIVE | Noted: 2024-03-17

## 2024-03-17 PROBLEM — M54.81 OCCIPITAL NEURALGIA: Status: ACTIVE | Noted: 2024-03-17

## 2024-03-17 PROBLEM — L60.0 INGROWING RIGHT GREAT TOENAIL: Status: ACTIVE | Noted: 2024-03-17

## 2024-03-17 PROBLEM — L08.9 TOE INFECTION: Status: ACTIVE | Noted: 2024-03-17

## 2024-03-17 PROBLEM — H61.23 BILATERAL IMPACTED CERUMEN: Status: ACTIVE | Noted: 2024-03-17

## 2024-03-19 ENCOUNTER — TELEPHONE (OUTPATIENT)
Dept: PRIMARY CARE CLINIC | Age: 89
End: 2024-03-19

## 2024-03-19 ENCOUNTER — OFFICE VISIT (OUTPATIENT)
Dept: PRIMARY CARE CLINIC | Age: 89
End: 2024-03-19
Payer: MEDICARE

## 2024-03-19 VITALS
BODY MASS INDEX: 23.62 KG/M2 | DIASTOLIC BLOOD PRESSURE: 68 MMHG | OXYGEN SATURATION: 98 % | SYSTOLIC BLOOD PRESSURE: 96 MMHG | HEART RATE: 76 BPM | WEIGHT: 137.6 LBS | TEMPERATURE: 97.9 F

## 2024-03-19 DIAGNOSIS — R63.0 POOR APPETITE: ICD-10-CM

## 2024-03-19 DIAGNOSIS — R03.1 LOW BLOOD PRESSURE READING: ICD-10-CM

## 2024-03-19 DIAGNOSIS — L08.9 TOE INFECTION: ICD-10-CM

## 2024-03-19 DIAGNOSIS — L60.0 INGROWING RIGHT GREAT TOENAIL: ICD-10-CM

## 2024-03-19 DIAGNOSIS — M54.81 OCCIPITAL NEURALGIA, UNSPECIFIED LATERALITY: Primary | ICD-10-CM

## 2024-03-19 DIAGNOSIS — R35.0 URINARY FREQUENCY: ICD-10-CM

## 2024-03-19 LAB
ALBUMIN SERPL-MCNC: 4.4 G/DL (ref 3.4–5)
ALBUMIN/GLOB SERPL: 2.2 {RATIO} (ref 1.1–2.2)
ALP SERPL-CCNC: 65 U/L (ref 40–129)
ALT SERPL-CCNC: 13 U/L (ref 10–40)
ANION GAP SERPL CALCULATED.3IONS-SCNC: 9 MMOL/L (ref 3–16)
AST SERPL-CCNC: 31 U/L (ref 15–37)
BASOPHILS # BLD: 0.1 K/UL (ref 0–0.2)
BASOPHILS NFR BLD: 0.8 %
BILIRUB SERPL-MCNC: 0.5 MG/DL (ref 0–1)
BILIRUBIN, POC: NORMAL
BLOOD URINE, POC: NORMAL
BUN SERPL-MCNC: 19 MG/DL (ref 7–20)
CALCIUM SERPL-MCNC: 10.1 MG/DL (ref 8.3–10.6)
CHLORIDE SERPL-SCNC: 103 MMOL/L (ref 99–110)
CLARITY, POC: CLEAR
CO2 SERPL-SCNC: 28 MMOL/L (ref 21–32)
COLOR, POC: YELLOW
CREAT SERPL-MCNC: 0.8 MG/DL (ref 0.6–1.2)
DEPRECATED RDW RBC AUTO: 14.1 % (ref 12.4–15.4)
EOSINOPHIL # BLD: 0.1 K/UL (ref 0–0.6)
EOSINOPHIL NFR BLD: 2 %
GFR SERPLBLD CREATININE-BSD FMLA CKD-EPI: >60 ML/MIN/{1.73_M2}
GLUCOSE SERPL-MCNC: 92 MG/DL (ref 70–99)
GLUCOSE URINE, POC: >=1000
HCT VFR BLD AUTO: 42.4 % (ref 36–48)
HGB BLD-MCNC: 14 G/DL (ref 12–16)
KETONES, POC: NORMAL
LEUKOCYTE EST, POC: NORMAL
LYMPHOCYTES # BLD: 1.2 K/UL (ref 1–5.1)
LYMPHOCYTES NFR BLD: 17.8 %
MCH RBC QN AUTO: 28.4 PG (ref 26–34)
MCHC RBC AUTO-ENTMCNC: 33.1 G/DL (ref 31–36)
MCV RBC AUTO: 85.8 FL (ref 80–100)
MONOCYTES # BLD: 0.8 K/UL (ref 0–1.3)
MONOCYTES NFR BLD: 11.4 %
NEUTROPHILS # BLD: 4.7 K/UL (ref 1.7–7.7)
NEUTROPHILS NFR BLD: 68 %
NITRITE, POC: NORMAL
PH, POC: 7
PLATELET # BLD AUTO: 180 K/UL (ref 135–450)
PMV BLD AUTO: 9.2 FL (ref 5–10.5)
POTASSIUM SERPL-SCNC: 4 MMOL/L (ref 3.5–5.1)
PROT SERPL-MCNC: 6.4 G/DL (ref 6.4–8.2)
PROTEIN, POC: NORMAL
RBC # BLD AUTO: 4.94 M/UL (ref 4–5.2)
SODIUM SERPL-SCNC: 140 MMOL/L (ref 136–145)
SPECIFIC GRAVITY, POC: 1.01
UROBILINOGEN, POC: 0.2
WBC # BLD AUTO: 6.9 K/UL (ref 4–11)

## 2024-03-19 PROCEDURE — 99214 OFFICE O/P EST MOD 30 MIN: CPT | Performed by: INTERNAL MEDICINE

## 2024-03-19 PROCEDURE — G8420 CALC BMI NORM PARAMETERS: HCPCS | Performed by: INTERNAL MEDICINE

## 2024-03-19 PROCEDURE — 81002 URINALYSIS NONAUTO W/O SCOPE: CPT | Performed by: INTERNAL MEDICINE

## 2024-03-19 PROCEDURE — G8484 FLU IMMUNIZE NO ADMIN: HCPCS | Performed by: INTERNAL MEDICINE

## 2024-03-19 PROCEDURE — 1090F PRES/ABSN URINE INCON ASSESS: CPT | Performed by: INTERNAL MEDICINE

## 2024-03-19 PROCEDURE — 1123F ACP DISCUSS/DSCN MKR DOCD: CPT | Performed by: INTERNAL MEDICINE

## 2024-03-19 PROCEDURE — 1036F TOBACCO NON-USER: CPT | Performed by: INTERNAL MEDICINE

## 2024-03-19 PROCEDURE — G8427 DOCREV CUR MEDS BY ELIG CLIN: HCPCS | Performed by: INTERNAL MEDICINE

## 2024-03-19 NOTE — TELEPHONE ENCOUNTER
Dr. Horne wanted staff to reach out to [facility] where patient lives to go over instructions with nursing staff there. Spoke with patient son Lai who gave me their number [phone] and to speak with the BERLIN Koenig. I called and had to leave a voicemail for Liberty to return my call and discuss the following:     Discontinue Tramadol    -Hold Spironolactone 3/19/24  -Hold Entresto evening dose 3/19/24  -Hold Furosemide 3/19/24    -Increase hydration    -Nurse to check blood pressure around 11:00am and 3:00pm on 3/19/24 and call Dr. Horne with readings  -Nurse to check blood pressure before morning blood pressure medications on 3/20/24 and call Dr. Horne prior to morning blood pressure medications    -Can take Tylenol (Acetaminophen) 500mg every 6 hours as needed for pain    -start Ensure nutritional shakes 1- 2 times daily     -Start Ensure shakes

## 2024-03-19 NOTE — PROGRESS NOTES
Date of Visit: 3/19/2024    Filomena Appiah (:  1935) is a 88 y.o. female,  Established patient here for evaluation of the following chief complaint(s):  Toe Infection and Occipital Neuralgia      ASSESSMENT/PLAN:    1. Occipital neuralgia, unspecified laterality  -Not controlled  -Discontinue tramadol due to low blood pressure and patient seems a little lethargic today  -Tylenol 500 mg every 6 hours as needed  -Follow-up with Pain management on 3/27/24 as scheduled    2. Toe infection  -Little improvement  -Continue doxycycline 100 mg 2 times daily  -CBC with Auto Differential; Future  -Follow-up with Podiatry as scheduled    3. Ingrowing right great toenail  -Little improvement  -Continue doxycycline 100 mg 2 times daily  -CBC with Auto Differential; Future  -Follow-up with Podiatry as scheduled    4. Low blood pressure reading  -Blood pressure is a little low today and lower than usual  -Discontinue tramadol  -Hold Spironolactone 3/19/24  -Hold Entresto evening dose 3/19/24  -Hold Furosemide 3/19/24  -Increase hydration  -Nurse to check blood pressure around 11:00am and 3:00pm on 3/19/24 and call Dr. Horne with readings  -Nurse to check blood pressure before morning blood pressure medications on 3/20/24 and call Dr. Horne prior to morning blood pressure medications    5. Urinary frequency  - POCT Urinalysis no Micro  - Culture, Urine    6. Poor appetite  - Comprehensive Metabolic Panel; Future  - CBC with Auto Differential; Future  -start Ensure nutritional shakes 1- 2 times daily         Return in about 2 weeks (around 2024) for blood pressure and poor appetite.    SUBJECTIVE:    Patient has occipital neuralgia. Patient states her head pain is not good. Patient takes tramadol 50 mg 2 times daily. Patient states it is hard to say if it helps. Patient states when the pain hits it hits. Patient has an appointment with pain management on 3/27/2024.    Patient has a toe infection and ingrowing

## 2024-03-19 NOTE — PATIENT INSTRUCTIONS
-Discontinue Tramadol    -Hold Spironolactone 3/19/24  -Hold Entresto evening dose 3/19/24  -Hold Furosemide 3/19/24    -Increase hydration    -Nurse to check blood pressure around 11:00am and 3:00pm on 3/19/24 and call Dr. Horne with readings  -Nurse to check blood pressure before morning blood pressure medications on 3/20/24 and call Dr. Horne prior to morning blood pressure medications    -Can take Tylenol (Acetaminophen) 500mg every 6 hours as needed for pain    -start Ensure nutritional shakes 1- 2 times daily     -Start Ensure shakes

## 2024-03-19 NOTE — TELEPHONE ENCOUNTER
Spoke with Liberty, she states she is not at the facility anymore and requested the information be sent via fax to [facility]. Attention to [facility] at fax number [phone].

## 2024-03-20 ENCOUNTER — TELEPHONE (OUTPATIENT)
Dept: PRIMARY CARE CLINIC | Age: 89
End: 2024-03-20

## 2024-03-20 LAB — BACTERIA UR CULT: NORMAL

## 2024-03-20 NOTE — TELEPHONE ENCOUNTER
Confirm whether to administer blood pressure medication - # [phone]  [facility]    Reporting a blood pressure reading  Before  medication     Blood pressure - 127/76  Heart rate 83

## 2024-03-20 NOTE — TELEPHONE ENCOUNTER
Spoke with nurse at [facility], gave verbal order to resume blood pressure medication.

## 2024-03-25 PROBLEM — R63.0 POOR APPETITE: Status: ACTIVE | Noted: 2024-03-25

## 2024-03-25 PROBLEM — R03.1 LOW BLOOD PRESSURE READING: Status: ACTIVE | Noted: 2024-03-25

## 2024-03-25 ASSESSMENT — ENCOUNTER SYMPTOMS
SINUS PRESSURE: 0
NAUSEA: 0
ABDOMINAL PAIN: 0
COUGH: 0
VOMITING: 0
SHORTNESS OF BREATH: 0
SORE THROAT: 0
DIARRHEA: 0
CONSTIPATION: 0
SINUS PAIN: 0
CHEST TIGHTNESS: 0
WHEEZING: 0
RHINORRHEA: 0

## 2024-04-04 ENCOUNTER — OFFICE VISIT (OUTPATIENT)
Dept: PRIMARY CARE CLINIC | Age: 89
End: 2024-04-04
Payer: MEDICARE

## 2024-04-04 VITALS
SYSTOLIC BLOOD PRESSURE: 134 MMHG | DIASTOLIC BLOOD PRESSURE: 62 MMHG | BODY MASS INDEX: 23.86 KG/M2 | WEIGHT: 139 LBS | OXYGEN SATURATION: 96 % | HEART RATE: 72 BPM

## 2024-04-04 DIAGNOSIS — R63.0 POOR APPETITE: ICD-10-CM

## 2024-04-04 DIAGNOSIS — I10 ESSENTIAL HYPERTENSION: Primary | ICD-10-CM

## 2024-04-04 PROCEDURE — 1036F TOBACCO NON-USER: CPT | Performed by: INTERNAL MEDICINE

## 2024-04-04 PROCEDURE — G8427 DOCREV CUR MEDS BY ELIG CLIN: HCPCS | Performed by: INTERNAL MEDICINE

## 2024-04-04 PROCEDURE — G8420 CALC BMI NORM PARAMETERS: HCPCS | Performed by: INTERNAL MEDICINE

## 2024-04-04 PROCEDURE — 99213 OFFICE O/P EST LOW 20 MIN: CPT | Performed by: INTERNAL MEDICINE

## 2024-04-04 PROCEDURE — 1090F PRES/ABSN URINE INCON ASSESS: CPT | Performed by: INTERNAL MEDICINE

## 2024-04-04 PROCEDURE — 1123F ACP DISCUSS/DSCN MKR DOCD: CPT | Performed by: INTERNAL MEDICINE

## 2024-04-04 NOTE — PROGRESS NOTES
Date of Visit: 2024    Filomena Appiah (:  1935) is a 88 y.o. female,  Established patient here for evaluation of the following chief complaint(s):  Blood Pressure Check and Poor Appetite      ASSESSMENT/PLAN:    1. Essential hypertension  -Stable  -Continue metoprolol ER 25 mg once daily  -Continue spironolactone 25mg once daily  -Continue Entresto 24-26 mg 2 times daily   -Low sodium diet    2. Poor appetite  -not controlled  -Patient reports not wanting to eat when she drinks the Ensure shakes  -Discontinue Ensure nutritional shakes  -Monitor        Return in about 3 months (around 2024) for hypertension, hyperlipidemia, hypothyroidism, .    SUBJECTIVE:    Patient's blood pressure is better. Patient is off tramadol. Patient is back on all of her blood pressure medication. Patient decreases salt.    Patient has figured out a poor appetite. Patient states when she drinks the Ensure shakes she does not want to eat. Told patient it is okay to stop the Ensure shakes. Patient's weight is up 2 pounds from last visit. Patient denies nausea, vomiting, abdominal pain. Patient's labs were okay.    Patient states she had her ingrown toenail removed after last visit and her foot feels better.      Review of Systems   Constitutional:  Positive for appetite change. Negative for chills, fever and unexpected weight change.   Eyes:  Negative for visual disturbance.   Respiratory:  Negative for chest tightness and shortness of breath.    Cardiovascular:  Negative for chest pain, palpitations and leg swelling.   Gastrointestinal:  Negative for abdominal pain, nausea and vomiting.   Genitourinary:  Negative for frequency and hematuria.   Neurological:  Negative for dizziness, syncope, light-headedness and headaches.       Allergies   Allergen Reactions    Penicillins Other (See Comments)     Tingling sensation on lip       Outpatient Medications Marked as Taking for the 24 encounter (Office Visit) with

## 2024-04-12 ENCOUNTER — TELEPHONE (OUTPATIENT)
Dept: PRIMARY CARE CLINIC | Age: 89
End: 2024-04-12

## 2024-04-12 DIAGNOSIS — H91.90 HEARING LOSS, UNSPECIFIED HEARING LOSS TYPE, UNSPECIFIED LATERALITY: Primary | ICD-10-CM

## 2024-04-12 NOTE — TELEPHONE ENCOUNTER
Pts son called in saying pt needs a referral for [facility] with Sandra Gonsales, [place] Phone number:[phone]

## 2024-04-13 ASSESSMENT — ENCOUNTER SYMPTOMS
SHORTNESS OF BREATH: 0
NAUSEA: 0
VOMITING: 0
CHEST TIGHTNESS: 0
ABDOMINAL PAIN: 0

## 2024-05-28 ENCOUNTER — TELEPHONE (OUTPATIENT)
Dept: PRIMARY CARE CLINIC | Age: 89
End: 2024-05-28

## 2024-05-28 NOTE — TELEPHONE ENCOUNTER
----- Message from Thelma Toscano sent at 5/28/2024 10:03 AM EDT -----  Regarding: ECC Message to Provider  ECC Message to Provider    Relationship to Patient: Self     Additional Information Pt said that \"she's requesting to Analisa Leslie to change the time of the Spironolactone from 12:00 to 7:00 or 7:30 trouble getitng the medication on time because she said that she cut it on half they lined up giving her medication at 12:00 not able to take 3 medication at the same time and i get that medication so that i can take that in the morning medication.  --------------------------------------------------------------------------------------------------------------------------    Call Back Information: OK to leave message on voicemail  Preferred Call Back Number: Phone [phone]

## 2024-05-29 NOTE — TELEPHONE ENCOUNTER
Spoke with tj at [facility] and gave verbal okay for pt to start taking medication at 7:30am now

## 2024-05-29 NOTE — TELEPHONE ENCOUNTER
Call Nursing at [facility]. It is ok to switch Spironolactone to morning 7:30am.

## 2024-05-29 NOTE — TELEPHONE ENCOUNTER
Call patient for clarification. Is she requesting a time change to take her Spironolactone at 7:30 AM or 7:30 PM?

## 2024-07-09 DIAGNOSIS — I10 ESSENTIAL HYPERTENSION: ICD-10-CM

## 2024-07-09 DIAGNOSIS — E78.2 MIXED HYPERLIPIDEMIA: ICD-10-CM

## 2024-07-09 DIAGNOSIS — I50.22 CHRONIC SYSTOLIC CONGESTIVE HEART FAILURE (HCC): ICD-10-CM

## 2024-07-09 DIAGNOSIS — I48.0 PAROXYSMAL ATRIAL FIBRILLATION (HCC): ICD-10-CM

## 2024-07-09 DIAGNOSIS — I63.9 ACUTE CEREBROVASCULAR ACCIDENT (CVA) (HCC): ICD-10-CM

## 2024-07-09 DIAGNOSIS — R07.9 CHEST PAIN, UNSPECIFIED TYPE: ICD-10-CM

## 2024-07-09 DIAGNOSIS — E89.0 POSTSURGICAL HYPOTHYROIDISM: ICD-10-CM

## 2024-07-09 DIAGNOSIS — I25.119 CORONARY ARTERY DISEASE INVOLVING NATIVE CORONARY ARTERY OF NATIVE HEART WITH ANGINA PECTORIS (HCC): ICD-10-CM

## 2024-07-09 RX ORDER — SPIRONOLACTONE 25 MG/1
TABLET ORAL
Refills: 0 | OUTPATIENT
Start: 2024-07-09

## 2024-07-09 RX ORDER — METOPROLOL SUCCINATE 25 MG/1
TABLET, EXTENDED RELEASE ORAL
Refills: 0 | OUTPATIENT
Start: 2024-07-09

## 2024-07-09 RX ORDER — ATORVASTATIN CALCIUM 10 MG/1
10 TABLET, FILM COATED ORAL NIGHTLY
Qty: 90 TABLET | Refills: 0 | Status: SHIPPED | OUTPATIENT
Start: 2024-07-09

## 2024-07-09 RX ORDER — SACUBITRIL AND VALSARTAN 24; 26 MG/1; MG/1
TABLET, FILM COATED ORAL
Refills: 0 | OUTPATIENT
Start: 2024-07-09

## 2024-07-09 RX ORDER — LEVOTHYROXINE SODIUM 0.07 MG/1
75 TABLET ORAL DAILY
Qty: 90 TABLET | Refills: 0 | Status: SHIPPED | OUTPATIENT
Start: 2024-07-09

## 2024-07-09 RX ORDER — CALCIUM CARBONATE/VITAMIN D3 500 MG-10
TABLET ORAL
Refills: 0 | OUTPATIENT
Start: 2024-07-09

## 2024-07-09 NOTE — TELEPHONE ENCOUNTER
Medication:   Requested Prescriptions     Pending Prescriptions Disp Refills    atorvastatin (LIPITOR) 10 MG tablet [Pharmacy Med Name: ATORVASTATIN TABS 10MG]  0    spironolactone (ALDACTONE) 25 MG tablet [Pharmacy Med Name: SPIRONOLACTONE TABS 25MG]  0    levothyroxine (SYNTHROID) 75 MCG tablet [Pharmacy Med Name: L-THYROXINE (SYNTHROID) TABS 75MCG]  0     Refused Prescriptions Disp Refills    ENTRESTO 24-26 MG per tablet [Pharmacy Med Name: ENTRESTO TABS 24/26MG]  0    metoprolol succinate (TOPROL XL) 25 MG extended release tablet [Pharmacy Med Name: METOPROLOL SUCCINATE ER TABS 25MG]  0    JARDIANCE 10 MG tablet [Pharmacy Med Name: JARDIANCE TABS 10MG]  0    OYSTER SHELL CALCIUM + D3 500-10 MG-MCG TABS [Pharmacy Med Name: OYSTER SHELL CALCIUM/VITD3 TABS 500/400]  0     Last Filled:  3/8/24 6/7/23    Last appt: 4/4/2024   Next appt: 8/8/2024    Last OARRS:       3/8/2024     9:41 AM   RX Monitoring   Periodic Controlled Substance Monitoring No signs of potential drug abuse or diversion identified.

## 2024-07-10 ENCOUNTER — TELEPHONE (OUTPATIENT)
Dept: PRIMARY CARE CLINIC | Age: 89
End: 2024-07-10

## 2024-07-10 DIAGNOSIS — I25.119 CORONARY ARTERY DISEASE INVOLVING NATIVE CORONARY ARTERY OF NATIVE HEART WITH ANGINA PECTORIS (HCC): ICD-10-CM

## 2024-07-10 DIAGNOSIS — I63.9 ACUTE CEREBROVASCULAR ACCIDENT (CVA) (HCC): ICD-10-CM

## 2024-07-10 DIAGNOSIS — I48.0 PAROXYSMAL ATRIAL FIBRILLATION (HCC): ICD-10-CM

## 2024-07-10 DIAGNOSIS — I10 ESSENTIAL HYPERTENSION: ICD-10-CM

## 2024-07-10 DIAGNOSIS — E78.2 MIXED HYPERLIPIDEMIA: ICD-10-CM

## 2024-07-10 DIAGNOSIS — I50.22 CHRONIC SYSTOLIC CONGESTIVE HEART FAILURE (HCC): ICD-10-CM

## 2024-07-10 DIAGNOSIS — R07.9 CHEST PAIN, UNSPECIFIED TYPE: ICD-10-CM

## 2024-07-10 RX ORDER — CHOLECALCIFEROL (VITAMIN D3) 50 MCG
1000 TABLET ORAL DAILY
Qty: 30 TABLET | Status: CANCELLED | OUTPATIENT
Start: 2024-07-10

## 2024-07-10 RX ORDER — SACUBITRIL AND VALSARTAN 24; 26 MG/1; MG/1
1 TABLET, FILM COATED ORAL 2 TIMES DAILY
Qty: 180 TABLET | Refills: 3 | Status: SHIPPED | OUTPATIENT
Start: 2024-07-10

## 2024-07-10 NOTE — TELEPHONE ENCOUNTER
Requested Prescriptions     Pending Prescriptions Disp Refills    empagliflozin (JARDIANCE) 10 MG tablet 90 tablet 3     Sig: Take 1 tablet by mouth daily    sacubitril-valsartan (ENTRESTO) 24-26 MG per tablet 180 tablet 3     Sig: Take 1 tablet by mouth 2 times daily            Checked Correct Pharmacy: Yes    Any changes since last refill? No         Last Office Visit: 12/21/2023     Next Office Visit: 12/17/2024

## 2024-07-10 NOTE — TELEPHONE ENCOUNTER
Pt requesting Rx refill      Medication  sacubitril-valsartan (ENTRESTO) 24-26 MG per tablet [[id number]]  sacubitril-valsartan (ENTRESTO) 24-26 MG per tablet [[id number]]    Order Details  Dose: 1 tablet Route: Oral Frequency: 2 TIMES DAILY   Dispense Quantity: 180 tablet Refills: 3          Sig: Take 1 tablet by mouth 2 times daily         empagliflozin (JARDIANCE) 10 MG tablet [[id number]]    Order Details  Dose: 10 mg Route: Oral Frequency: DAILY   Dispense Quantity: 90 tablet Refills: 3          Sig: Take 1 tablet by mouth daily         Pharmacy    [facility] - [address] -  [phone] - F [phone]  [address] [place] [id number]  Phone: [phone]  Fax: [phone]

## 2024-07-10 NOTE — TELEPHONE ENCOUNTER
Medication:   Requested Prescriptions     Pending Prescriptions Disp Refills    vitamin D (CHOLECALCIFEROL) 50 MCG (2000 UT) TABS tablet 30 tablet      Sig: Take 0.5 tablets by mouth daily     Last filled:   Last appt: 4/4/2024   Next appt: 8/8/2024    Last OARRS:       3/8/2024     9:41 AM   RX Monitoring   Periodic Controlled Substance Monitoring No signs of potential drug abuse or diversion identified.

## 2024-07-10 NOTE — TELEPHONE ENCOUNTER
Patient called and stated to inform that her pharmacy   [facility] - [place], [place] - [address] [place] [address] - P [phone] - F [phone]     Requested to send the new prescription for     Calcium Carb-Cholecalciferol     Pl review and advice    Thanks

## 2024-07-11 NOTE — TELEPHONE ENCOUNTER
Call patient. What does of calcium carb-cholecalciferol is she taking and how does she take it once or twice daily? Also inform patient [facility] may or may not cover it since it is an over the counter vitamin supplement.

## 2024-07-15 DIAGNOSIS — I10 ESSENTIAL HYPERTENSION: ICD-10-CM

## 2024-07-15 DIAGNOSIS — I25.119 CORONARY ARTERY DISEASE INVOLVING NATIVE CORONARY ARTERY OF NATIVE HEART WITH ANGINA PECTORIS (HCC): ICD-10-CM

## 2024-07-15 NOTE — TELEPHONE ENCOUNTER
Medication:   Requested Prescriptions     Pending Prescriptions Disp Refills    metoprolol succinate (TOPROL XL) 25 MG extended release tablet [Pharmacy Med Name: METOPROLOL SUCCINATE ER TABS 25MG]  0    spironolactone (ALDACTONE) 25 MG tablet [Pharmacy Med Name: SPIRONOLACTONE TABS 25MG]  0     Last Filled:  10.30.23     Last appt: 4/4/2024   Next appt: 8/8/2024    Last OARRS:       3/8/2024     9:41 AM   RX Monitoring   Periodic Controlled Substance Monitoring No signs of potential drug abuse or diversion identified.

## 2024-07-16 RX ORDER — SPIRONOLACTONE 25 MG/1
25 TABLET ORAL DAILY
Qty: 90 TABLET | Refills: 1 | Status: SHIPPED | OUTPATIENT
Start: 2024-07-16

## 2024-07-16 RX ORDER — METOPROLOL SUCCINATE 25 MG/1
25 TABLET, EXTENDED RELEASE ORAL DAILY
Qty: 90 TABLET | Refills: 1 | Status: SHIPPED | OUTPATIENT
Start: 2024-07-16

## 2024-07-18 DIAGNOSIS — I63.9 ACUTE CEREBROVASCULAR ACCIDENT (CVA) (HCC): ICD-10-CM

## 2024-07-18 DIAGNOSIS — R07.9 CHEST PAIN, UNSPECIFIED TYPE: ICD-10-CM

## 2024-07-18 DIAGNOSIS — I50.22 CHRONIC SYSTOLIC CONGESTIVE HEART FAILURE (HCC): ICD-10-CM

## 2024-07-18 DIAGNOSIS — I48.0 PAROXYSMAL ATRIAL FIBRILLATION (HCC): ICD-10-CM

## 2024-07-18 DIAGNOSIS — I10 ESSENTIAL HYPERTENSION: ICD-10-CM

## 2024-07-18 DIAGNOSIS — I25.119 CORONARY ARTERY DISEASE INVOLVING NATIVE CORONARY ARTERY OF NATIVE HEART WITH ANGINA PECTORIS (HCC): ICD-10-CM

## 2024-07-18 DIAGNOSIS — E78.2 MIXED HYPERLIPIDEMIA: ICD-10-CM

## 2024-07-18 NOTE — TELEPHONE ENCOUNTER
I Medication Refills:    empagliflozin (JARDIANCE) 10 MG tablet [[id number]]    Order Details  Dose: 10 mg Route: Oral Frequency: DAILY   Dispense Quantity: 90 tablet Refills: 3          Sig: Take 1 tablet by mouth daily       Last Office Visit: 2/27/24    Next Office Visit: 8/27/24    [facility] - [address] [phone] -  [phone] [16]

## 2024-07-18 NOTE — TELEPHONE ENCOUNTER
Patient called in and stated she spoke with [facility] regarding her empagliflozin (JARDIANCE) 10 MG tablet.  She said the pharmacy stated they had notified the doctor several times about getting a refill but haven't heard anything back.  She was requested by the pharmacist to call Dr. Welsh for the medication.  She stated Dr. Welsh needed to send in another script to the pharmacy. She stated she did receive the Entresto medication.

## 2024-07-26 ENCOUNTER — HOSPITAL ENCOUNTER (OUTPATIENT)
Dept: WOMENS IMAGING | Age: 89
Discharge: HOME | End: 2024-07-26
Payer: MEDICARE

## 2024-07-26 VITALS — BODY MASS INDEX: 23.73 KG/M2 | HEIGHT: 64 IN | WEIGHT: 139 LBS

## 2024-07-26 DIAGNOSIS — Z12.31 VISIT FOR SCREENING MAMMOGRAM: ICD-10-CM

## 2024-07-26 PROCEDURE — 77063 BREAST TOMOSYNTHESIS BI: CPT

## 2024-08-01 ENCOUNTER — TELEPHONE (OUTPATIENT)
Dept: CARDIOLOGY CLINIC | Age: 89
End: 2024-08-01

## 2024-08-06 ENCOUNTER — HOSPITAL ENCOUNTER (OUTPATIENT)
Dept: ULTRASOUND IMAGING | Age: 89
Discharge: HOME | End: 2024-08-06
Payer: MEDICARE

## 2024-08-06 DIAGNOSIS — R92.8 ABNORMAL MAMMOGRAM OF RIGHT BREAST: ICD-10-CM

## 2024-08-06 PROCEDURE — 76642 ULTRASOUND BREAST LIMITED: CPT

## 2024-08-08 ENCOUNTER — OFFICE VISIT (OUTPATIENT)
Dept: PRIMARY CARE CLINIC | Age: 89
End: 2024-08-08
Payer: MEDICARE

## 2024-08-08 VITALS
DIASTOLIC BLOOD PRESSURE: 76 MMHG | OXYGEN SATURATION: 97 % | BODY MASS INDEX: 22.88 KG/M2 | HEIGHT: 64 IN | WEIGHT: 134 LBS | SYSTOLIC BLOOD PRESSURE: 120 MMHG | HEART RATE: 71 BPM | TEMPERATURE: 97.8 F

## 2024-08-08 DIAGNOSIS — I10 ESSENTIAL HYPERTENSION: ICD-10-CM

## 2024-08-08 DIAGNOSIS — E89.0 POSTSURGICAL HYPOTHYROIDISM: ICD-10-CM

## 2024-08-08 DIAGNOSIS — E55.9 VITAMIN D DEFICIENCY: ICD-10-CM

## 2024-08-08 DIAGNOSIS — Z00.00 MEDICARE ANNUAL WELLNESS VISIT, SUBSEQUENT: Primary | ICD-10-CM

## 2024-08-08 DIAGNOSIS — R11.11 VOMITING WITHOUT NAUSEA, UNSPECIFIED VOMITING TYPE: ICD-10-CM

## 2024-08-08 DIAGNOSIS — R10.816 EPIGASTRIC ABDOMINAL TENDERNESS, REBOUND TENDERNESS PRESENCE NOT SPECIFIED: ICD-10-CM

## 2024-08-08 DIAGNOSIS — E78.2 MIXED HYPERLIPIDEMIA: ICD-10-CM

## 2024-08-08 PROCEDURE — G0439 PPPS, SUBSEQ VISIT: HCPCS | Performed by: INTERNAL MEDICINE

## 2024-08-08 PROCEDURE — 1123F ACP DISCUSS/DSCN MKR DOCD: CPT | Performed by: INTERNAL MEDICINE

## 2024-08-08 RX ORDER — FAMOTIDINE 20 MG/1
20 TABLET, FILM COATED ORAL 2 TIMES DAILY PRN
COMMUNITY
Start: 2024-08-08

## 2024-08-08 ASSESSMENT — PATIENT HEALTH QUESTIONNAIRE - PHQ9
SUM OF ALL RESPONSES TO PHQ QUESTIONS 1-9: 13
6. FEELING BAD ABOUT YOURSELF - OR THAT YOU ARE A FAILURE OR HAVE LET YOURSELF OR YOUR FAMILY DOWN: NOT AT ALL
8. MOVING OR SPEAKING SO SLOWLY THAT OTHER PEOPLE COULD HAVE NOTICED. OR THE OPPOSITE, BEING SO FIGETY OR RESTLESS THAT YOU HAVE BEEN MOVING AROUND A LOT MORE THAN USUAL: SEVERAL DAYS
SUM OF ALL RESPONSES TO PHQ QUESTIONS 1-9: 13
9. THOUGHTS THAT YOU WOULD BE BETTER OFF DEAD, OR OF HURTING YOURSELF: NOT AT ALL
SUM OF ALL RESPONSES TO PHQ QUESTIONS 1-9: 13
10. IF YOU CHECKED OFF ANY PROBLEMS, HOW DIFFICULT HAVE THESE PROBLEMS MADE IT FOR YOU TO DO YOUR WORK, TAKE CARE OF THINGS AT HOME, OR GET ALONG WITH OTHER PEOPLE: SOMEWHAT DIFFICULT
SUM OF ALL RESPONSES TO PHQ QUESTIONS 1-9: 13
4. FEELING TIRED OR HAVING LITTLE ENERGY: NEARLY EVERY DAY
7. TROUBLE CONCENTRATING ON THINGS, SUCH AS READING THE NEWSPAPER OR WATCHING TELEVISION: SEVERAL DAYS
1. LITTLE INTEREST OR PLEASURE IN DOING THINGS: MORE THAN HALF THE DAYS
5. POOR APPETITE OR OVEREATING: NEARLY EVERY DAY
3. TROUBLE FALLING OR STAYING ASLEEP: NEARLY EVERY DAY

## 2024-08-08 ASSESSMENT — LIFESTYLE VARIABLES
HOW MANY STANDARD DRINKS CONTAINING ALCOHOL DO YOU HAVE ON A TYPICAL DAY: 1 OR 2
HOW OFTEN DO YOU HAVE A DRINK CONTAINING ALCOHOL: MONTHLY OR LESS

## 2024-08-08 NOTE — PROGRESS NOTES
Penicillins Other (See Comments)     Tingling sensation on lip     Prior to Visit Medications    Medication Sig Taking? Authorizing Provider   famotidine (PEPCID) 20 MG tablet Take 1 tablet by mouth 2 times daily as needed (abdominal tenderness, reflux, or vomiting) Yes Analisa Horne MD   empagliflozin (JARDIANCE) 10 MG tablet Take 1 tablet by mouth daily Yes Florencio Welsh MD   metoprolol succinate (TOPROL XL) 25 MG extended release tablet Take 1 tablet by mouth daily Yes Analisa Horne MD   spironolactone (ALDACTONE) 25 MG tablet Take 1 tablet by mouth daily Yes Analisa Horne MD   sacubitril-valsartan (ENTRESTO) 24-26 MG per tablet Take 1 tablet by mouth 2 times daily Yes Florencio Welsh MD   levothyroxine (SYNTHROID) 75 MCG tablet Take 1 tablet by mouth Daily Yes Analisa Horne MD   furosemide (LASIX) 20 MG tablet TAKE 1 TABLET BY MOUTH DAILY EVERY MONDAY , EVERY WEDNESDAY (DX: _________)  Strength: 20 mg  Patient taking differently: PRN Yes Florencio Welsh MD   acetaminophen (TYLENOL) 500 MG tablet Take 1 tablet by mouth 4 times daily as needed for Pain Yes Analisa Horne MD   aspirin 81 MG EC tablet Take 1 tablet by mouth daily Yes Analisa Horne MD   vitamin D (CHOLECALCIFEROL) 50 MCG (2000 UT) TABS tablet Take 0.5 tablets by mouth daily Yes ProviderMihcelle MD   atorvastatin (LIPITOR) 10 MG tablet Take 1 tablet by mouth at bedtime  Patient not taking: Reported on 8/8/2024  Analisa Horne MD       [facility] (Including outside providers/suppliers regularly involved in providing care):   Patient Care Team:  Analisa Horne MD as PCP - General (Internal Medicine)  Analisa Horne MD as PCP - Empaneled Provider      Reviewed and updated this visit:  Tobacco  Allergies  Meds  Problems  Med Hx  Surg Hx  Soc Hx  Fam Hx

## 2024-08-15 ENCOUNTER — HOSPITAL ENCOUNTER (OUTPATIENT)
Dept: MAMMOGRAPHY | Age: 89
Discharge: HOME | End: 2024-08-15
Payer: MEDICARE

## 2024-08-15 ENCOUNTER — HOSPITAL ENCOUNTER (OUTPATIENT)
Dept: ULTRASOUND IMAGING | Age: 89
Discharge: HOME | End: 2024-08-15
Payer: MEDICARE

## 2024-08-15 DIAGNOSIS — N63.0 BREAST MASS IN FEMALE: ICD-10-CM

## 2024-08-15 PROCEDURE — 19083 BX BREAST 1ST LESION US IMAG: CPT

## 2024-08-15 PROCEDURE — 88305 TISSUE EXAM BY PATHOLOGIST: CPT

## 2024-08-15 PROCEDURE — 77065 DX MAMMO INCL CAD UNI: CPT

## 2024-08-19 PROBLEM — Z00.00 MEDICARE ANNUAL WELLNESS VISIT, SUBSEQUENT: Status: ACTIVE | Noted: 2024-08-19

## 2024-08-19 PROBLEM — R10.816 EPIGASTRIC ABDOMINAL TENDERNESS: Status: ACTIVE | Noted: 2024-08-19

## 2024-08-19 PROBLEM — R11.11 VOMITING WITHOUT NAUSEA: Status: ACTIVE | Noted: 2024-08-19

## 2024-08-19 NOTE — ASSESSMENT & PLAN NOTE
-Stable  -Continue metoprolol ER 25 mg once daily  -Continue spironolactone 25mg once daily  -Continue Entresto 24-26 mg 2 times daily   -Low sodium diet

## 2024-08-26 ASSESSMENT — ENCOUNTER SYMPTOMS
CONSTIPATION: 0
COLOR CHANGE: 0
ABDOMINAL PAIN: 0
DIARRHEA: 0
APNEA: 0
NAUSEA: 0
CHEST TIGHTNESS: 0
STRIDOR: 0
ABDOMINAL DISTENTION: 0
BACK PAIN: 0
VOMITING: 0
WHEEZING: 0
TROUBLE SWALLOWING: 0
BLOOD IN STOOL: 0
EYE PAIN: 0
PHOTOPHOBIA: 0

## 2024-08-26 NOTE — PROGRESS NOTES
08/05/2021    AGRATIO 2.2 03/19/2024    GLOB 2.0 08/05/2021         Lab Results   Component Value Date    CHOL 169 11/20/2023    CHOL 181 03/07/2023    CHOL 224 (H) 04/13/2021     Lab Results   Component Value Date    TRIG 42 11/20/2023    TRIG 62 03/07/2023    TRIG 64 04/13/2021     Lab Results   Component Value Date    HDL 85 (H) 11/20/2023    HDL 87 (H) 03/07/2023     (H) 04/13/2021     No components found for: \"LDLCHOLESTEROL\", \"LDLCALC\"    Lab Results   Component Value Date    VLDL 8 11/20/2023    VLDL 12 03/07/2023    VLDL 13 04/13/2021       No results found for: \"CHOLHDLRATIO\"    Lab Results   Component Value Date    INR 0.95 03/30/2020    PROTIME 11.0 03/30/2020       The ASCVD Risk score (Lia DK, et al., 2019) failed to calculate for the following reasons:    The 2019 ASCVD risk score is only valid for ages 40 to 79    The patient has a prior MI or stroke diagnosis      Imaging:       Last ECG (if available):  Sinus rhythm     Last Stress (if available): 1/10/19  FINDINGS:  The overall image quality of the study is good.  Myocardial SPECT perfusion imaging following stress reveals uniform uptake of tracer throughout the left ventricle with artifact. There is a mild defect in the inferior region likely due to diaphragmatic attenuation. Rest images are similar. Prone imaging reveals resolution of the defect in the inferior region.    Left ventricular cavity size is normal. The overall left ventricular systolic function is normal. The calculated left ventricular ejection fraction is 70 %. No regional wall thickening or wall motion abnormalities are present at rest. Transient left ventricular cavity dilation at stress is not present.  SUMMARY:   1. Myocardial perfusion imaging is normal with artifact.   2. There is no scintigraphic evidence of myocardial ischemia.   3. There is no scintigraphic evidence of prior myocardial infarction.   4. There is no definite ECG evidence of ischemia.   5.  walls. 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 Overall left ventricular   systolic function appears globally reduced. Ejection fraction is visually   estimated to be 35-40%.   IVC size is dilated (>2.1 cm) and collapses < 50% with respiration   consistent with elevated RA pressure (15 mmHg).    3/30/20  Summary   Normal left ventricular size.   Mild concentric left ventricular hypertrophy.   Mildly decreased left ventricular systolic function with an estimated   ejection fraction of 45%-50%.   Mild global hypokinesis.   Grade I diastolic dysfunction with normal LV filling pressures.   The left atrium is mildly dilated.   Estimated pulmonary artery systolic pressure is at 25 mmHg assuming a right   atrial pressure of 3 mmHg.   A bubble study was performed and fails to show evidence of shunting.    Last CMR  (if available):      Assessment / Plan:     CAD (coronary artery disease)  Minimal disease, no angina    Essential hypertension  BP well-controlled, continue metoprolol, Entresto, Aldactone    Chronic systolic congestive heart failure (HCC)  Compensated, continue Jardiance, metoprolol, Entresto, Aldactone.  Has Lasix as needed although she does not take it/need it.    Mixed hyperlipidemia  Lipitor 10.  No side effects    Follow up in 6 months.    I had the opportunity to review the clinical symptoms and presentation of Filomena Appiah.     Patient's allergies and medications were reviewed and updated. Patient's past medical, surgical, social and family history were reviewed and updated.   Patient's testing including laboratory, ECGs, monitor, imaging (TTE,NEVIN,CMR,cath) were reviewed.       All questions and concerns were

## 2024-08-27 ENCOUNTER — OFFICE VISIT (OUTPATIENT)
Dept: CARDIOLOGY CLINIC | Age: 89
End: 2024-08-27
Payer: MEDICARE

## 2024-08-27 VITALS
OXYGEN SATURATION: 92 % | WEIGHT: 134.6 LBS | HEART RATE: 72 BPM | DIASTOLIC BLOOD PRESSURE: 60 MMHG | SYSTOLIC BLOOD PRESSURE: 100 MMHG | BODY MASS INDEX: 23.1 KG/M2

## 2024-08-27 DIAGNOSIS — I50.22 CHRONIC SYSTOLIC CONGESTIVE HEART FAILURE (HCC): ICD-10-CM

## 2024-08-27 DIAGNOSIS — I25.119 CORONARY ARTERY DISEASE INVOLVING NATIVE CORONARY ARTERY OF NATIVE HEART WITH ANGINA PECTORIS (HCC): Primary | ICD-10-CM

## 2024-08-27 DIAGNOSIS — I10 ESSENTIAL HYPERTENSION: ICD-10-CM

## 2024-08-27 DIAGNOSIS — E78.2 MIXED HYPERLIPIDEMIA: Chronic | ICD-10-CM

## 2024-08-27 PROCEDURE — 1036F TOBACCO NON-USER: CPT | Performed by: INTERNAL MEDICINE

## 2024-08-27 PROCEDURE — 1090F PRES/ABSN URINE INCON ASSESS: CPT | Performed by: INTERNAL MEDICINE

## 2024-08-27 PROCEDURE — G8420 CALC BMI NORM PARAMETERS: HCPCS | Performed by: INTERNAL MEDICINE

## 2024-08-27 PROCEDURE — G8427 DOCREV CUR MEDS BY ELIG CLIN: HCPCS | Performed by: INTERNAL MEDICINE

## 2024-08-27 PROCEDURE — 99214 OFFICE O/P EST MOD 30 MIN: CPT | Performed by: INTERNAL MEDICINE

## 2024-08-27 PROCEDURE — 1123F ACP DISCUSS/DSCN MKR DOCD: CPT | Performed by: INTERNAL MEDICINE

## 2024-08-27 NOTE — ASSESSMENT & PLAN NOTE
Compensated, continue Jardiance, metoprolol, Entresto, Aldactone.  Has Lasix as needed although she does not take it/need it.

## 2024-09-18 PROBLEM — Z00.00 MEDICARE ANNUAL WELLNESS VISIT, SUBSEQUENT: Status: RESOLVED | Noted: 2024-08-19 | Resolved: 2024-09-18

## 2024-10-07 DIAGNOSIS — E89.0 POSTSURGICAL HYPOTHYROIDISM: ICD-10-CM

## 2024-10-07 RX ORDER — LEVOTHYROXINE SODIUM 75 MCG
75 TABLET ORAL DAILY
Qty: 90 TABLET | Refills: 0 | Status: SHIPPED | OUTPATIENT
Start: 2024-10-07

## 2024-10-07 NOTE — TELEPHONE ENCOUNTER
Medication:   Requested Prescriptions     Pending Prescriptions Disp Refills    SYNTHROID 75 MCG tablet [Pharmacy Med Name: SYNTHROID TABS 75MCG] 90 tablet 3     Sig: TAKE 1 TABLET DAILY     Last Filled:  07/09/2024    Last appt: 8/8/2024   Next appt: 12/5/2024    Last OARRS:       3/8/2024     9:41 AM   RX Monitoring   Periodic Controlled Substance Monitoring No signs of potential drug abuse or diversion identified.

## 2024-10-25 DIAGNOSIS — I10 ESSENTIAL HYPERTENSION: ICD-10-CM

## 2024-10-25 DIAGNOSIS — E55.9 VITAMIN D DEFICIENCY: ICD-10-CM

## 2024-10-25 DIAGNOSIS — E89.0 POSTSURGICAL HYPOTHYROIDISM: ICD-10-CM

## 2024-10-25 DIAGNOSIS — E78.2 MIXED HYPERLIPIDEMIA: ICD-10-CM

## 2024-10-25 DIAGNOSIS — R10.816 EPIGASTRIC ABDOMINAL TENDERNESS, REBOUND TENDERNESS PRESENCE NOT SPECIFIED: ICD-10-CM

## 2024-10-25 LAB
25(OH)D3 SERPL-MCNC: 32.4 NG/ML
ALBUMIN SERPL-MCNC: 4.3 G/DL (ref 3.4–5)
ALBUMIN/GLOB SERPL: 2.3 {RATIO} (ref 1.1–2.2)
ALP SERPL-CCNC: 66 U/L (ref 40–129)
ALT SERPL-CCNC: 10 U/L (ref 10–40)
ANION GAP SERPL CALCULATED.3IONS-SCNC: 10 MMOL/L (ref 3–16)
AST SERPL-CCNC: 23 U/L (ref 15–37)
BASOPHILS # BLD: 0 K/UL (ref 0–0.2)
BASOPHILS NFR BLD: 0.5 %
BILIRUB SERPL-MCNC: 0.5 MG/DL (ref 0–1)
BUN SERPL-MCNC: 16 MG/DL (ref 7–20)
CALCIUM SERPL-MCNC: 10.2 MG/DL (ref 8.3–10.6)
CHLORIDE SERPL-SCNC: 108 MMOL/L (ref 99–110)
CHOLEST SERPL-MCNC: 177 MG/DL (ref 0–199)
CO2 SERPL-SCNC: 26 MMOL/L (ref 21–32)
CREAT SERPL-MCNC: 0.8 MG/DL (ref 0.6–1.2)
DEPRECATED RDW RBC AUTO: 15.4 % (ref 12.4–15.4)
EOSINOPHIL # BLD: 0.1 K/UL (ref 0–0.6)
EOSINOPHIL NFR BLD: 2.1 %
GFR SERPLBLD CREATININE-BSD FMLA CKD-EPI: 71 ML/MIN/{1.73_M2}
GLUCOSE SERPL-MCNC: 78 MG/DL (ref 70–99)
HCT VFR BLD AUTO: 43.8 % (ref 36–48)
HDLC SERPL-MCNC: 80 MG/DL (ref 40–60)
HGB BLD-MCNC: 13.8 G/DL (ref 12–16)
LDLC SERPL CALC-MCNC: 88 MG/DL
LIPASE SERPL-CCNC: 58 U/L (ref 13–60)
LYMPHOCYTES # BLD: 1.6 K/UL (ref 1–5.1)
LYMPHOCYTES NFR BLD: 26.5 %
MCH RBC QN AUTO: 27.5 PG (ref 26–34)
MCHC RBC AUTO-ENTMCNC: 31.5 G/DL (ref 31–36)
MCV RBC AUTO: 87.4 FL (ref 80–100)
MONOCYTES # BLD: 0.7 K/UL (ref 0–1.3)
MONOCYTES NFR BLD: 11.4 %
NEUTROPHILS # BLD: 3.5 K/UL (ref 1.7–7.7)
NEUTROPHILS NFR BLD: 59.5 %
PLATELET # BLD AUTO: 172 K/UL (ref 135–450)
PMV BLD AUTO: 9.1 FL (ref 5–10.5)
POTASSIUM SERPL-SCNC: 4.3 MMOL/L (ref 3.5–5.1)
PROT SERPL-MCNC: 6.2 G/DL (ref 6.4–8.2)
RBC # BLD AUTO: 5.01 M/UL (ref 4–5.2)
SODIUM SERPL-SCNC: 144 MMOL/L (ref 136–145)
TRIGL SERPL-MCNC: 47 MG/DL (ref 0–150)
TSH SERPL DL<=0.005 MIU/L-ACNC: 0.24 UIU/ML (ref 0.27–4.2)
VLDLC SERPL CALC-MCNC: 9 MG/DL
WBC # BLD AUTO: 5.9 K/UL (ref 4–11)

## 2024-11-01 ENCOUNTER — TELEPHONE (OUTPATIENT)
Dept: PRIMARY CARE CLINIC | Age: 89
End: 2024-11-01

## 2024-11-01 NOTE — TELEPHONE ENCOUNTER
Patient called in about atorvastatin stated she believes medication  may have changed and has been getting an orally dissolvable tablet and does not what to continue with this. Patient states she only has this issue sometimes and not all the time as she lives in assisted living.   Patient is requesting alternative as she does not like taking the dissolvable medication.     Call patient back [phone]. Can speak with Lai patient's son per HIPAA

## 2024-11-01 NOTE — TELEPHONE ENCOUNTER
Advised patient that she was not given an ODT for atorvastatin and reiterated That medication could look different in the near future due to supply change. Patient verbalized understanding

## 2024-11-01 NOTE — TELEPHONE ENCOUNTER
Spoke to patient's son, verified that patient is aware of what medication she takes and that medications are only prescribed by Dr. Horne and Dr. Welsh with cardiology. Son states patient has not seen any physician associated with the assisted living facility and has no medication changes to his knowledge,     Advised patient's son to obtain updated medication list from assisted living and try to verify information regarding possible changes with them and to call office back

## 2024-11-01 NOTE — TELEPHONE ENCOUNTER
Pt's son came to drop off paperwork for Dr. Horne. I have scanned it into the pt's chart and put it on Chester's desk.

## 2024-11-01 NOTE — TELEPHONE ENCOUNTER
Spoke to CORETTA Fernández at [facility]. She states patient has not brought tablets dissolving to their attention and believes she has no issues swallowing medications that need to be addressed. States they notified patient that their pharmacy supplier was changing and the  of the medication would be changing. Rn stated that patient does not like when her medication changes shape, color, size etc.

## 2024-11-18 ENCOUNTER — TELEPHONE (OUTPATIENT)
Dept: CARDIOLOGY CLINIC | Age: 89
End: 2024-11-18

## 2024-11-18 NOTE — TELEPHONE ENCOUNTER
Requested Prescriptions     Pending Prescriptions Disp Refills    nitroGLYCERIN (NITROSTAT) 0.4 MG SL tablet 25 tablet 3     Sig: Place 1 tablet under the tongue every 5 minutes as needed for Chest pain            Checked Correct Pharmacy: Yes    Any changes since last refill? No     Number: 25    Refills: 3    Last Office Visit: 12/21/2023     Next Office Visit: 12/19/2024         Last Labs: 10.25.2024

## 2024-11-18 NOTE — TELEPHONE ENCOUNTER
Patient would like a call back to see if she can be prescribed  Nitroglycerin. Can reach pt at [phone]

## 2024-11-19 RX ORDER — NITROGLYCERIN 0.4 MG/1
0.4 TABLET SUBLINGUAL EVERY 5 MIN PRN
Qty: 25 TABLET | Refills: 3 | Status: SHIPPED | OUTPATIENT
Start: 2024-11-19

## 2024-11-26 PROCEDURE — 93297 REM INTERROG DEV EVAL ICPMS: CPT | Performed by: NURSE PRACTITIONER

## 2024-11-26 NOTE — PROGRESS NOTES
[facility]   Electrophysiology  Office Visit    Date: 12/19/2024    Chief Complaint   Patient presents with    Cardiomyopathy    Congestive Heart Failure    Tachycardia    Palpitations    Bradycardia    Hypertension     Cardiac HX: Filomena Appiah is a 89 y.o. woman with a h/o HTN, HLD, congenital blindness, breast CA, tobacco abuse, CVA, mild CAD, MPI (2021) WNL, NICMP, EF 30-35%, echo (6/2021) EF 35-40%, s/p dual ch Biotronik ICD (2/2023, Dr. Duane Steele, [place], [place]), moved from [place] in 2023.     Interval History/HPI: Patient is here for follow-up for NICMP, chronic systolic CHF and ICD management.  Patient had previously been seen in [place] and moved to [place] in 2023.  She had been seen by our practice in the past when she lived in [place].  She has an ongoing nonischemic cardiomyopathy.  She had an echo in February 2023 that showed her EF was 30 to 35%.  She had a left heart cath that showed minimal CAD.  She was implanted with a dual-chamber Biotronik ICD on 2/1/2023.  Today she presents in AP, VS rhythm.   Review of her device today shows 21% AP, 3% , 19 monitored VT and 37 other episodes that appear to be ectopy, SVT versus AT, normal thoracic impedance, other parameters stable.  She remains on Jardiance 10 mg daily, Entresto 24-26 mg twice a day, Toprol XL 12.5 mg daily and spironolactone 25 mg daily.  Patient states that she is currently at an assisted living.  There are times when she is not getting her Entresto within the timeframe she thinks it needs to be and they have occasionally missed her evening dose.  She states that this upsets her a lot and she feels better with the Entresto.  Patient had been admitted in November with a hematoma after a fall.  She was noted to be hypotensive.  They held all of her meds in the hospital which upset her.  They did put her back on Entresto and Toprol.  Her amlodipine was held.  She had an echo that showed her EF was 25 to 30%.  She also

## 2024-12-02 ENCOUNTER — TELEPHONE (OUTPATIENT)
Dept: CARDIOLOGY CLINIC | Age: 89
End: 2024-12-02

## 2024-12-02 NOTE — TELEPHONE ENCOUNTER
Pt's son called wanting to let [place] know that pt is currently admitted at [facility] for a fall. They are also performing several tests that they have not heard anything back yet on yet.

## 2024-12-05 ENCOUNTER — OFFICE VISIT (OUTPATIENT)
Dept: PRIMARY CARE CLINIC | Age: 89
End: 2024-12-05
Payer: MEDICARE

## 2024-12-05 ENCOUNTER — TELEPHONE (OUTPATIENT)
Dept: PRIMARY CARE CLINIC | Age: 89
End: 2024-12-05

## 2024-12-05 VITALS
TEMPERATURE: 97.9 F | SYSTOLIC BLOOD PRESSURE: 100 MMHG | OXYGEN SATURATION: 97 % | RESPIRATION RATE: 12 BRPM | HEIGHT: 64 IN | DIASTOLIC BLOOD PRESSURE: 68 MMHG | BODY MASS INDEX: 22.71 KG/M2 | HEART RATE: 80 BPM | WEIGHT: 133 LBS
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
## 2024-12-05 DIAGNOSIS — S30.0XXA TRAUMATIC HEMATOMA OF BUTTOCK, INITIAL ENCOUNTER: Primary | ICD-10-CM

## 2024-12-05 DIAGNOSIS — R73.03 PREDIABETES: ICD-10-CM

## 2024-12-05 DIAGNOSIS — E78.2 MIXED HYPERLIPIDEMIA: Chronic | ICD-10-CM

## 2024-12-05 DIAGNOSIS — D69.6 LOW PLATELET COUNT (HCC): ICD-10-CM

## 2024-12-05 DIAGNOSIS — D64.9 ANEMIA, UNSPECIFIED TYPE: ICD-10-CM

## 2024-12-05 DIAGNOSIS — E55.9 VITAMIN D DEFICIENCY: ICD-10-CM

## 2024-12-05 DIAGNOSIS — I48.0 PAROXYSMAL ATRIAL FIBRILLATION (HCC): ICD-10-CM

## 2024-12-05 DIAGNOSIS — E89.0 POSTSURGICAL HYPOTHYROIDISM: ICD-10-CM

## 2024-12-05 DIAGNOSIS — M79.10 MYALGIA: ICD-10-CM

## 2024-12-05 DIAGNOSIS — I10 ESSENTIAL HYPERTENSION: ICD-10-CM

## 2024-12-05 LAB
ANION GAP SERPL CALCULATED.3IONS-SCNC: 10 MMOL/L (ref 3–16)
BUN SERPL-MCNC: 17 MG/DL (ref 7–20)
CALCIUM SERPL-MCNC: 10 MG/DL (ref 8.3–10.6)
CHLORIDE SERPL-SCNC: 107 MMOL/L (ref 99–110)
CO2 SERPL-SCNC: 25 MMOL/L (ref 21–32)
CREAT SERPL-MCNC: 0.9 MG/DL (ref 0.6–1.2)
DEPRECATED RDW RBC AUTO: 15.8 % (ref 12.4–15.4)
GFR SERPLBLD CREATININE-BSD FMLA CKD-EPI: 62 ML/MIN/{1.73_M2}
GLUCOSE SERPL-MCNC: 122 MG/DL (ref 70–99)
HCT VFR BLD AUTO: 35.3 % (ref 36–48)
HGB BLD-MCNC: 11.4 G/DL (ref 12–16)
IRON SATN MFR SERPL: 28 % (ref 15–50)
IRON SERPL-MCNC: 68 UG/DL (ref 37–145)
MCH RBC QN AUTO: 28.3 PG (ref 26–34)
MCHC RBC AUTO-ENTMCNC: 32.3 G/DL (ref 31–36)
MCV RBC AUTO: 87.4 FL (ref 80–100)
PLATELET # BLD AUTO: 184 K/UL (ref 135–450)
PMV BLD AUTO: 9.3 FL (ref 5–10.5)
POTASSIUM SERPL-SCNC: 3.9 MMOL/L (ref 3.5–5.1)
RBC # BLD AUTO: 4.04 M/UL (ref 4–5.2)
SODIUM SERPL-SCNC: 142 MMOL/L (ref 136–145)
TIBC SERPL-MCNC: 244 UG/DL (ref 260–445)
TSH SERPL DL<=0.005 MIU/L-ACNC: 0.75 UIU/ML (ref 0.27–4.2)
WBC # BLD AUTO: 7.4 K/UL (ref 4–11)

## 2024-12-05 PROCEDURE — G8484 FLU IMMUNIZE NO ADMIN: HCPCS | Performed by: INTERNAL MEDICINE

## 2024-12-05 PROCEDURE — G8427 DOCREV CUR MEDS BY ELIG CLIN: HCPCS | Performed by: INTERNAL MEDICINE

## 2024-12-05 PROCEDURE — 99214 OFFICE O/P EST MOD 30 MIN: CPT | Performed by: INTERNAL MEDICINE

## 2024-12-05 PROCEDURE — 1036F TOBACCO NON-USER: CPT | Performed by: INTERNAL MEDICINE

## 2024-12-05 PROCEDURE — 1123F ACP DISCUSS/DSCN MKR DOCD: CPT | Performed by: INTERNAL MEDICINE

## 2024-12-05 PROCEDURE — 1090F PRES/ABSN URINE INCON ASSESS: CPT | Performed by: INTERNAL MEDICINE

## 2024-12-05 PROCEDURE — G8420 CALC BMI NORM PARAMETERS: HCPCS | Performed by: INTERNAL MEDICINE

## 2024-12-05 RX ORDER — CHOLECALCIFEROL (VITAMIN D3) 50 MCG
2000 TABLET ORAL DAILY
COMMUNITY
Start: 2024-12-05

## 2024-12-05 RX ORDER — SENNOSIDES 8.6 MG
650 CAPSULE ORAL EVERY 6 HOURS PRN
Qty: 120 TABLET | Refills: 2 | Status: SHIPPED | OUTPATIENT
Start: 2024-12-05

## 2024-12-05 RX ORDER — METOPROLOL SUCCINATE 25 MG/1
12.5 TABLET, EXTENDED RELEASE ORAL DAILY
COMMUNITY
Start: 2024-12-05

## 2024-12-05 RX ORDER — ACETAMINOPHEN 325 MG/1
650 TABLET ORAL EVERY 6 HOURS PRN
Qty: 240 TABLET | Refills: 2 | Status: SHIPPED | OUTPATIENT
Start: 2024-12-05

## 2024-12-05 NOTE — PATIENT INSTRUCTIONS
-Continue same medications except decrease Metoprolol ER 25mg to 1/2 tablet once daily    -Hold Metoprolol ER if BP is less than 100/60    -Tylenol 650mg 3-4 times daily

## 2024-12-05 NOTE — TELEPHONE ENCOUNTER
Changed prescription for Acetaminophen to 325mg 2 tablets every 6 hours as needed. Spoke with Pharmacist, Earl to inform him.

## 2024-12-05 NOTE — PROGRESS NOTES
Date of Visit: 2024    Filomena Appiah (:  1935) is a 89 y.o. female,  Established patient here for evaluation of the following chief complaint(s):  Follow-Up from Hospital (Fall )      ASSESSMENT/PLAN:    1. Traumatic hematoma of buttock, initial encounter  Assessment & Plan:  -Tylenol 650mg 3-4 times daily  -ice as needed  -sit on a cushion or donut  -DME order for donut given  Orders:  -     DME Order for (Specify) as OP  -     acetaminophen (TYLENOL 8 HOUR) 650 MG extended release tablet; Take 1 tablet by mouth every 6 hours as needed for Pain, Disp-120 tablet, R-2Normal  2. Myalgia  Assessment & Plan:  -s/p fall with injury on 24  -Hospitalization 24-12/3/24  -Tylenol 650mg 3-4 times daily   3. Injury due to fall, initial encounter  Assessment & Plan:  -s/p fall with injury on 24  -no fractures  -Hospitalization 24-12/3/24  -Tylenol 650mg 3-4 times daily   4. Essential hypertension  Assessment & Plan:  -Blood pressure is a little low  -Decrease metoprolol ER 25 mg to 1/2 tablet once daily  -Continue spironolactone 25mg once daily  -Continue Entresto 24-26 mg 2 times daily   -Low sodium diet   -Hold Metoprolol ER if BP is less than 100/60  Orders:  -     Basic Metabolic Panel; Future  5. Postsurgical hypothyroidism  Assessment & Plan:  -Stable  -Continue Synthroid 75 mcg once daily   Orders:  -     TSH; Future  6. Vitamin D deficiency  Assessment & Plan:  -Stable  -Continue vitamin D 2000 IU once daily   Orders:  -     vitamin D (CHOLECALCIFEROL) 50 MCG (2000 UT) TABS tablet; Take 1 tablet by mouth dailyOTC  7. Prediabetes  Assessment & Plan:  -new  -Hemoglobin A1c of 6.2% during hospital admission on 24  -low carbohydrate diet  8. Paroxysmal atrial fibrillation (HCC)  Assessment & Plan:  -stable  -history of and no recurrences  -Decrease metoprolol ER 25 mg to 1/2 tablet once daily  -Continue aspirin 81 mg once daily  9. Anemia, unspecified type  Assessment &

## 2024-12-05 NOTE — TELEPHONE ENCOUNTER
Called pharmacy to advise about medication, pharmacy stated the acetaminophen is too much and that we need to simplify it

## 2024-12-05 NOTE — TELEPHONE ENCOUNTER
Earl from [facility] is calling wanting to know if the script sent is replacing all the other meds the pt is on.Please give him a call back at [phone].

## 2024-12-05 NOTE — TELEPHONE ENCOUNTER
Call Pharmacist. The new prescription for Acetaminophen needs to replace the previous prescription due to patient's injury. Patient needs to take Acetaminophen 650mg 4 times daily as needed due to pain due to recent injury.

## 2024-12-16 PROBLEM — S30.0XXA TRAUMATIC HEMATOMA OF BUTTOCK: Status: ACTIVE | Noted: 2024-12-16

## 2024-12-16 PROBLEM — M79.10 MYALGIA: Status: ACTIVE | Noted: 2024-12-16

## 2024-12-16 PROBLEM — D69.6 LOW PLATELET COUNT (HCC): Status: ACTIVE | Noted: 2024-12-16

## 2024-12-16 PROBLEM — D64.9 ANEMIA: Status: ACTIVE | Noted: 2024-12-16
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
## 2024-12-16 PROBLEM — R73.03 PREDIABETES: Status: ACTIVE | Noted: 2024-12-16

## 2024-12-16 ASSESSMENT — ENCOUNTER SYMPTOMS
DIARRHEA: 0
NAUSEA: 0
VOMITING: 0
WHEEZING: 0
CHEST TIGHTNESS: 0
BACK PAIN: 1
SINUS PRESSURE: 0
TROUBLE SWALLOWING: 0
CONSTIPATION: 1
RHINORRHEA: 0
SORE THROAT: 0
ABDOMINAL PAIN: 0
SHORTNESS OF BREATH: 0
BLOOD IN STOOL: 0
COUGH: 0

## 2024-12-16 NOTE — ASSESSMENT & PLAN NOTE
-s/p fall with injury on 11/30/24  -no fractures  -Hospitalization 11/30/24-12/3/24  -Tylenol 650mg 3-4 times daily

## 2024-12-16 NOTE — ASSESSMENT & PLAN NOTE
-Tylenol 650mg 3-4 times daily  -ice as needed  -sit on a cushion or donut  -DME order for donut given

## 2024-12-16 NOTE — ASSESSMENT & PLAN NOTE
-Blood pressure is a little low  -Decrease metoprolol ER 25 mg to 1/2 tablet once daily  -Continue spironolactone 25mg once daily  -Continue Entresto 24-26 mg 2 times daily   -Low sodium diet   -Hold Metoprolol ER if BP is less than 100/60

## 2024-12-16 NOTE — ASSESSMENT & PLAN NOTE
-s/p fall with injury on 11/30/24  -Hospitalization 11/30/24-12/3/24  -Tylenol 650mg 3-4 times daily

## 2024-12-16 NOTE — ASSESSMENT & PLAN NOTE
-stable  -history of and no recurrences  -Decrease metoprolol ER 25 mg to 1/2 tablet once daily  -Continue aspirin 81 mg once daily

## 2024-12-19 ENCOUNTER — NURSE ONLY (OUTPATIENT)
Dept: CARDIOLOGY CLINIC | Age: 89
End: 2024-12-19

## 2024-12-19 ENCOUNTER — OFFICE VISIT (OUTPATIENT)
Dept: CARDIOLOGY CLINIC | Age: 89
End: 2024-12-19
Payer: MEDICARE

## 2024-12-19 VITALS
SYSTOLIC BLOOD PRESSURE: 110 MMHG | DIASTOLIC BLOOD PRESSURE: 64 MMHG | HEART RATE: 64 BPM | BODY MASS INDEX: 22.66 KG/M2 | WEIGHT: 132 LBS

## 2024-12-19 DIAGNOSIS — I50.22 CHRONIC SYSTOLIC CONGESTIVE HEART FAILURE (HCC): ICD-10-CM

## 2024-12-19 DIAGNOSIS — I42.8 NON-ISCHEMIC CARDIOMYOPATHY (HCC): ICD-10-CM

## 2024-12-19 DIAGNOSIS — Z95.810 ICD (IMPLANTABLE CARDIOVERTER-DEFIBRILLATOR), DUAL, IN SITU: ICD-10-CM

## 2024-12-19 DIAGNOSIS — I10 ESSENTIAL HYPERTENSION: ICD-10-CM

## 2024-12-19 DIAGNOSIS — Z95.810 CARDIAC DEFIBRILLATOR IN SITU: Primary | ICD-10-CM

## 2024-12-19 DIAGNOSIS — I48.0 PAROXYSMAL ATRIAL FIBRILLATION (HCC): ICD-10-CM

## 2024-12-19 DIAGNOSIS — I95.89 OTHER SPECIFIED HYPOTENSION: ICD-10-CM

## 2024-12-19 DIAGNOSIS — I42.8 NICM (NONISCHEMIC CARDIOMYOPATHY) (HCC): Primary | ICD-10-CM

## 2024-12-19 DIAGNOSIS — I50.22 CHRONIC SYSTOLIC CHF (CONGESTIVE HEART FAILURE) (HCC): ICD-10-CM

## 2024-12-19 PROCEDURE — G8484 FLU IMMUNIZE NO ADMIN: HCPCS | Performed by: NURSE PRACTITIONER

## 2024-12-19 PROCEDURE — 1123F ACP DISCUSS/DSCN MKR DOCD: CPT | Performed by: NURSE PRACTITIONER

## 2024-12-19 PROCEDURE — 99215 OFFICE O/P EST HI 40 MIN: CPT | Performed by: NURSE PRACTITIONER

## 2024-12-19 PROCEDURE — G8427 DOCREV CUR MEDS BY ELIG CLIN: HCPCS | Performed by: NURSE PRACTITIONER

## 2024-12-19 PROCEDURE — G8420 CALC BMI NORM PARAMETERS: HCPCS | Performed by: NURSE PRACTITIONER

## 2024-12-19 PROCEDURE — 1090F PRES/ABSN URINE INCON ASSESS: CPT | Performed by: NURSE PRACTITIONER

## 2024-12-19 PROCEDURE — 93000 ELECTROCARDIOGRAM COMPLETE: CPT | Performed by: NURSE PRACTITIONER

## 2024-12-19 PROCEDURE — 1160F RVW MEDS BY RX/DR IN RCRD: CPT | Performed by: NURSE PRACTITIONER

## 2024-12-19 PROCEDURE — 1036F TOBACCO NON-USER: CPT | Performed by: NURSE PRACTITIONER

## 2024-12-19 PROCEDURE — 1159F MED LIST DOCD IN RCRD: CPT | Performed by: NURSE PRACTITIONER

## 2024-12-20 ENCOUNTER — OFFICE VISIT (OUTPATIENT)
Dept: PRIMARY CARE CLINIC | Age: 89
End: 2024-12-20
Payer: MEDICARE

## 2024-12-20 VITALS
WEIGHT: 134 LBS | HEIGHT: 64 IN | SYSTOLIC BLOOD PRESSURE: 90 MMHG | DIASTOLIC BLOOD PRESSURE: 60 MMHG | BODY MASS INDEX: 22.88 KG/M2 | RESPIRATION RATE: 16 BRPM | OXYGEN SATURATION: 97 % | HEART RATE: 71 BPM | TEMPERATURE: 98.1 F

## 2024-12-20 DIAGNOSIS — I10 ESSENTIAL HYPERTENSION: Primary | ICD-10-CM

## 2024-12-20 DIAGNOSIS — S30.0XXA TRAUMATIC HEMATOMA OF BUTTOCK, INITIAL ENCOUNTER: ICD-10-CM

## 2024-12-20 PROCEDURE — 99213 OFFICE O/P EST LOW 20 MIN: CPT | Performed by: INTERNAL MEDICINE

## 2024-12-20 PROCEDURE — G8420 CALC BMI NORM PARAMETERS: HCPCS | Performed by: INTERNAL MEDICINE

## 2024-12-20 PROCEDURE — G8484 FLU IMMUNIZE NO ADMIN: HCPCS | Performed by: INTERNAL MEDICINE

## 2024-12-20 PROCEDURE — 1090F PRES/ABSN URINE INCON ASSESS: CPT | Performed by: INTERNAL MEDICINE

## 2024-12-20 PROCEDURE — 1123F ACP DISCUSS/DSCN MKR DOCD: CPT | Performed by: INTERNAL MEDICINE

## 2024-12-20 PROCEDURE — 1036F TOBACCO NON-USER: CPT | Performed by: INTERNAL MEDICINE

## 2024-12-20 PROCEDURE — G8427 DOCREV CUR MEDS BY ELIG CLIN: HCPCS | Performed by: INTERNAL MEDICINE

## 2024-12-20 SDOH — ECONOMIC STABILITY: FOOD INSECURITY: WITHIN THE PAST 12 MONTHS, YOU WORRIED THAT YOUR FOOD WOULD RUN OUT BEFORE YOU GOT MONEY TO BUY MORE.: NEVER TRUE

## 2024-12-20 SDOH — ECONOMIC STABILITY: FOOD INSECURITY: WITHIN THE PAST 12 MONTHS, THE FOOD YOU BOUGHT JUST DIDN'T LAST AND YOU DIDN'T HAVE MONEY TO GET MORE.: NEVER TRUE

## 2024-12-20 SDOH — ECONOMIC STABILITY: INCOME INSECURITY: HOW HARD IS IT FOR YOU TO PAY FOR THE VERY BASICS LIKE FOOD, HOUSING, MEDICAL CARE, AND HEATING?: NOT HARD AT ALL

## 2024-12-20 ASSESSMENT — PATIENT HEALTH QUESTIONNAIRE - PHQ9
3. TROUBLE FALLING OR STAYING ASLEEP: NOT AT ALL
SUM OF ALL RESPONSES TO PHQ QUESTIONS 1-9: 0
7. TROUBLE CONCENTRATING ON THINGS, SUCH AS READING THE NEWSPAPER OR WATCHING TELEVISION: NOT AT ALL
4. FEELING TIRED OR HAVING LITTLE ENERGY: NOT AT ALL
1. LITTLE INTEREST OR PLEASURE IN DOING THINGS: NOT AT ALL
10. IF YOU CHECKED OFF ANY PROBLEMS, HOW DIFFICULT HAVE THESE PROBLEMS MADE IT FOR YOU TO DO YOUR WORK, TAKE CARE OF THINGS AT HOME, OR GET ALONG WITH OTHER PEOPLE: NOT DIFFICULT AT ALL
9. THOUGHTS THAT YOU WOULD BE BETTER OFF DEAD, OR OF HURTING YOURSELF: NOT AT ALL
8. MOVING OR SPEAKING SO SLOWLY THAT OTHER PEOPLE COULD HAVE NOTICED. OR THE OPPOSITE, BEING SO FIGETY OR RESTLESS THAT YOU HAVE BEEN MOVING AROUND A LOT MORE THAN USUAL: NOT AT ALL
SUM OF ALL RESPONSES TO PHQ QUESTIONS 1-9: 0
SUM OF ALL RESPONSES TO PHQ9 QUESTIONS 1 & 2: 0
2. FEELING DOWN, DEPRESSED OR HOPELESS: NOT AT ALL
6. FEELING BAD ABOUT YOURSELF - OR THAT YOU ARE A FAILURE OR HAVE LET YOURSELF OR YOUR FAMILY DOWN: NOT AT ALL
SUM OF ALL RESPONSES TO PHQ QUESTIONS 1-9: 0
5. POOR APPETITE OR OVEREATING: NOT AT ALL
SUM OF ALL RESPONSES TO PHQ QUESTIONS 1-9: 0

## 2025-01-07 ENCOUNTER — TELEPHONE (OUTPATIENT)
Dept: PRIMARY CARE CLINIC | Age: 89
End: 2025-01-07

## 2025-01-07 ASSESSMENT — ENCOUNTER SYMPTOMS
SHORTNESS OF BREATH: 0
CHEST TIGHTNESS: 0

## 2025-01-07 NOTE — ASSESSMENT & PLAN NOTE
-Stable  -Continue metoprolol ER 25 mg to 1/2 tablet once daily  -Continue spironolactone 25mg once daily  -Continue Entresto 24-26 mg 2 times daily   -Low sodium diet   -Hold Metoprolol ER if BP is less than 100/60

## 2025-01-07 NOTE — TELEPHONE ENCOUNTER
Pt called and need a call back about her BP and her meds for BP. She has been running high. Top number [phone] dont know lower number.    Pls call pt to discuss what she should do.

## 2025-01-08 ENCOUNTER — OFFICE VISIT (OUTPATIENT)
Dept: PRIMARY CARE CLINIC | Age: 89
End: 2025-01-08
Payer: MEDICARE

## 2025-01-08 VITALS
HEART RATE: 60 BPM | OXYGEN SATURATION: 98 % | DIASTOLIC BLOOD PRESSURE: 72 MMHG | TEMPERATURE: 97.7 F | WEIGHT: 132 LBS | SYSTOLIC BLOOD PRESSURE: 120 MMHG | HEIGHT: 64 IN | RESPIRATION RATE: 13 BRPM | BODY MASS INDEX: 22.53 KG/M2

## 2025-01-08 DIAGNOSIS — S30.0XXA TRAUMATIC HEMATOMA OF BUTTOCK, INITIAL ENCOUNTER: ICD-10-CM

## 2025-01-08 DIAGNOSIS — I10 ESSENTIAL HYPERTENSION: Primary | ICD-10-CM

## 2025-01-08 PROCEDURE — 1036F TOBACCO NON-USER: CPT | Performed by: INTERNAL MEDICINE

## 2025-01-08 PROCEDURE — 99213 OFFICE O/P EST LOW 20 MIN: CPT | Performed by: INTERNAL MEDICINE

## 2025-01-08 PROCEDURE — 1123F ACP DISCUSS/DSCN MKR DOCD: CPT | Performed by: INTERNAL MEDICINE

## 2025-01-08 PROCEDURE — 1090F PRES/ABSN URINE INCON ASSESS: CPT | Performed by: INTERNAL MEDICINE

## 2025-01-08 PROCEDURE — G8420 CALC BMI NORM PARAMETERS: HCPCS | Performed by: INTERNAL MEDICINE

## 2025-01-08 PROCEDURE — G8427 DOCREV CUR MEDS BY ELIG CLIN: HCPCS | Performed by: INTERNAL MEDICINE

## 2025-01-08 RX ORDER — METOPROLOL SUCCINATE 25 MG/1
25 TABLET, EXTENDED RELEASE ORAL DAILY
Qty: 90 TABLET | Refills: 1 | Status: SHIPPED | OUTPATIENT
Start: 2025-01-08

## 2025-01-08 ASSESSMENT — PATIENT HEALTH QUESTIONNAIRE - PHQ9
1. LITTLE INTEREST OR PLEASURE IN DOING THINGS: SEVERAL DAYS
SUM OF ALL RESPONSES TO PHQ QUESTIONS 1-9: 2
3. TROUBLE FALLING OR STAYING ASLEEP: NOT AT ALL
9. THOUGHTS THAT YOU WOULD BE BETTER OFF DEAD, OR OF HURTING YOURSELF: NOT AT ALL
6. FEELING BAD ABOUT YOURSELF - OR THAT YOU ARE A FAILURE OR HAVE LET YOURSELF OR YOUR FAMILY DOWN: NOT AT ALL
7. TROUBLE CONCENTRATING ON THINGS, SUCH AS READING THE NEWSPAPER OR WATCHING TELEVISION: NOT AT ALL
SUM OF ALL RESPONSES TO PHQ QUESTIONS 1-9: 2
SUM OF ALL RESPONSES TO PHQ9 QUESTIONS 1 & 2: 2
SUM OF ALL RESPONSES TO PHQ QUESTIONS 1-9: 2
8. MOVING OR SPEAKING SO SLOWLY THAT OTHER PEOPLE COULD HAVE NOTICED. OR THE OPPOSITE, BEING SO FIGETY OR RESTLESS THAT YOU HAVE BEEN MOVING AROUND A LOT MORE THAN USUAL: NOT AT ALL
2. FEELING DOWN, DEPRESSED OR HOPELESS: SEVERAL DAYS
4. FEELING TIRED OR HAVING LITTLE ENERGY: NOT AT ALL
5. POOR APPETITE OR OVEREATING: NOT AT ALL
SUM OF ALL RESPONSES TO PHQ QUESTIONS 1-9: 2
10. IF YOU CHECKED OFF ANY PROBLEMS, HOW DIFFICULT HAVE THESE PROBLEMS MADE IT FOR YOU TO DO YOUR WORK, TAKE CARE OF THINGS AT HOME, OR GET ALONG WITH OTHER PEOPLE: NOT DIFFICULT AT ALL

## 2025-01-08 NOTE — PROGRESS NOTES
Date of Visit: 2025    Filomena Appiah (:  1935) is a 89 y.o. female,  Established patient here for evaluation of the following chief complaint(s):  Hypertension and Other (Hematoma  follow up )      ASSESSMENT/PLAN:    1. Essential hypertension  Assessment & Plan:  -not controlled  -blood pressure has been high  -Increase metoprolol ER 25 mg to 1 tablet once daily  -Continue spironolactone 25mg once daily  -Continue Entresto 24-26 mg 2 times daily   -Low sodium diet   Orders:  -     metoprolol succinate (TOPROL XL) 25 MG extended release tablet; Take 1 tablet by mouth daily, Disp-90 tablet, R-1Normal  2. Traumatic hematoma of buttock, initial encounter  Assessment & Plan:  -significant improvement  -Continue Tylenol 650mg 3-4 times daily  -ice as needed  -sit on a cushion or donut      Return in about 3 months (around 2025) for hypertension.    SUBJECTIVE:    Patient has hypertension. Patient takes metoprolol ER 25mg 1/2 tablet once daily, spironolactone 25mg once daily, and Entresto 24-26 mg 2 times daily. Patient states her BP was high for a couple of weeks at -155/? and 174/? Patient doesn't add salt.     Patient's hematoma is improving and pain is improving. Patient takes Tylenol as needed.      Review of Systems   Eyes:  Negative for visual disturbance.   Respiratory:  Negative for chest tightness and shortness of breath.    Cardiovascular:  Negative for chest pain, palpitations and leg swelling.   Genitourinary:  Negative for frequency and hematuria.   Musculoskeletal:  Positive for myalgias.   Neurological:  Negative for dizziness, syncope, light-headedness and headaches.   Hematological:  Bruises/bleeds easily (hematoma).       Allergies   Allergen Reactions    Penicillins Other (See Comments)     Tingling sensation on lip       Prior to Visit Medications    Medication Sig Taking? Authorizing Provider   metoprolol succinate (TOPROL XL) 25 MG extended release tablet Take 0.5

## 2025-01-23 ASSESSMENT — ENCOUNTER SYMPTOMS
SHORTNESS OF BREATH: 0
CHEST TIGHTNESS: 0

## 2025-01-23 NOTE — ASSESSMENT & PLAN NOTE
-not controlled  -blood pressure has been high  -Increase metoprolol ER 25 mg to 1 tablet once daily  -Continue spironolactone 25mg once daily  -Continue Entresto 24-26 mg 2 times daily   -Low sodium diet   -Hold Metoprolol ER if BP is less than 100/60

## 2025-01-23 NOTE — ASSESSMENT & PLAN NOTE
-significant improvement  -Continue Tylenol 650mg 3-4 times daily  -ice as needed  -sit on a cushion or donut

## 2025-01-31 DIAGNOSIS — E89.0 POSTSURGICAL HYPOTHYROIDISM: ICD-10-CM

## 2025-01-31 RX ORDER — LEVOTHYROXINE SODIUM 75 MCG
75 TABLET ORAL DAILY
Qty: 90 TABLET | Refills: 1 | Status: SHIPPED | OUTPATIENT
Start: 2025-01-31

## 2025-01-31 NOTE — TELEPHONE ENCOUNTER
Medication:   Requested Prescriptions     Pending Prescriptions Disp Refills    SYNTHROID 75 MCG tablet 90 tablet 0     Sig: Take 1 tablet by mouth daily     Last Filled:  10/07/24    Last appt: 1/8/2025   Next appt: 4/18/2025    Last Thyroid:   Lab Results   Component Value Date/Time    TSH 0.75 12/05/2024 08:42 AM

## 2025-01-31 NOTE — TELEPHONE ENCOUNTER
Medication Refill  SYNTHROID 75 MCG tablet     *Patient completely out-of-medication  *Please follow up with assisted living facility for Pharmacy

## 2025-02-05 ENCOUNTER — TELEPHONE (OUTPATIENT)
Dept: PRIMARY CARE CLINIC | Age: 89
End: 2025-02-05

## 2025-02-05 NOTE — TELEPHONE ENCOUNTER
Pt called in for meds refill. She mentioned that she needs the Name Brand med and not generic. Pt is out for 5 days.Please contact pt when completed.These meds should go to this pharmacy as [facility], but continue all other meds with the saved pharmacy on file.  SYNTHROID 75 MCG tablet [[id number]]   [facility] [address], [place] [address], [phone].

## 2025-02-25 ENCOUNTER — OFFICE VISIT (OUTPATIENT)
Dept: CARDIOLOGY CLINIC | Age: 89
End: 2025-02-25
Payer: MEDICARE

## 2025-02-25 ENCOUNTER — TELEPHONE (OUTPATIENT)
Dept: PRIMARY CARE CLINIC | Age: 89
End: 2025-02-25

## 2025-02-25 VITALS
SYSTOLIC BLOOD PRESSURE: 122 MMHG | BODY MASS INDEX: 22.73 KG/M2 | DIASTOLIC BLOOD PRESSURE: 64 MMHG | WEIGHT: 132.4 LBS | HEART RATE: 73 BPM

## 2025-02-25 DIAGNOSIS — I25.119 CORONARY ARTERY DISEASE INVOLVING NATIVE CORONARY ARTERY OF NATIVE HEART WITH ANGINA PECTORIS: ICD-10-CM

## 2025-02-25 DIAGNOSIS — I48.0 PAROXYSMAL ATRIAL FIBRILLATION (HCC): ICD-10-CM

## 2025-02-25 DIAGNOSIS — I50.22 CHRONIC SYSTOLIC CONGESTIVE HEART FAILURE (HCC): Primary | ICD-10-CM

## 2025-02-25 DIAGNOSIS — Z95.810 CARDIAC DEFIBRILLATOR IN SITU: ICD-10-CM

## 2025-02-25 PROCEDURE — 1036F TOBACCO NON-USER: CPT | Performed by: INTERNAL MEDICINE

## 2025-02-25 PROCEDURE — 1090F PRES/ABSN URINE INCON ASSESS: CPT | Performed by: INTERNAL MEDICINE

## 2025-02-25 PROCEDURE — G8420 CALC BMI NORM PARAMETERS: HCPCS | Performed by: INTERNAL MEDICINE

## 2025-02-25 PROCEDURE — G8427 DOCREV CUR MEDS BY ELIG CLIN: HCPCS | Performed by: INTERNAL MEDICINE

## 2025-02-25 PROCEDURE — 1123F ACP DISCUSS/DSCN MKR DOCD: CPT | Performed by: INTERNAL MEDICINE

## 2025-02-25 PROCEDURE — 99214 OFFICE O/P EST MOD 30 MIN: CPT | Performed by: INTERNAL MEDICINE

## 2025-02-25 PROCEDURE — 1159F MED LIST DOCD IN RCRD: CPT | Performed by: INTERNAL MEDICINE

## 2025-02-25 RX ORDER — ASCORBIC ACID 500 MG
500 TABLET ORAL
COMMUNITY

## 2025-02-25 ASSESSMENT — ENCOUNTER SYMPTOMS
VOMITING: 0
CONSTIPATION: 0
BLOOD IN STOOL: 0
WHEEZING: 0
BACK PAIN: 0
APNEA: 0
ABDOMINAL PAIN: 0
COLOR CHANGE: 0
PHOTOPHOBIA: 0
DIARRHEA: 0
NAUSEA: 0
CHEST TIGHTNESS: 0
STRIDOR: 0
ABDOMINAL DISTENTION: 0
TROUBLE SWALLOWING: 0
EYE PAIN: 0

## 2025-02-25 NOTE — ASSESSMENT & PLAN NOTE
Doing well on current dose of Entresto, Jardiance, spironolactone, Toprol.  Most recently admitted with syncope and borderline blood pressure  Today we discussed holding spironolactone for the next few days and see how she feels.  Will try the same with metoprolol if there is no changes.  Ultimately I think we may need to discontinue 1 of these drugs or cut both in half

## 2025-02-25 NOTE — TELEPHONE ENCOUNTER
Patient stopped at the desk said that her synthroid medication can be taken by herself without nurses giving it to her at meal time please call Lai connor if any questions. The [facility] contact bernarda stevenson [phone]

## 2025-02-25 NOTE — PROGRESS NOTES
[facility]     Outpatient Cardiology         Chief Complaint   Patient presents with    Congestive Heart Failure    Coronary Artery Disease       HPI     Filomena Khoury a 89 y.o. female with PMH of HTN, HLD, mild CAD, HFrEF, S/p dual chamber ICD on 2/1/23, breast cancer.  Here for follow up for HFrEF, HTN, and HLD.     Chronic systolic heart failure, compensated, currently taking jardiance 10 mg daily, entresto 24-26 mg BID, toprol 25 mg daily, aldactone 25 mg daily recently admitted to the hospital with syncope/hypotension.  Still feeling somewhat lightheaded after taking medications.  Hypertension, controlled on Entresto and metoprolol along with Aldactone, today will discontinue Aldactone and reassess  Hyperlipidemia, Last LDL 88 (10/25/24), on Lipitor 10 mg daily, no myalgias, LDL at goal, no myalgias  Nonobstructive coronary disease, on aspirin, no angina  Overall doing fairly well from a cardiac respective      PMH  Past Medical History:   Diagnosis Date    Breast cancer (HCC)     left    CAD (coronary artery disease)     Cancer (HCC)     breast    Cerebral artery occlusion with cerebral infarction (HCC)     Chronic mid back pain     Hearing deficit     History of alcohol abuse     IN THE PAST    History of breast cancer     History of therapeutic radiation     left    Hyperlipidemia     Hypertension     Macular degeneration     Medial meniscus tear     Mixed hyperlipidemia 09/17/2019    Osteoarthritis of knee     Osteoporosis     Peripheral neuropathy     Postsurgical hypothyroidism     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

## 2025-02-27 ENCOUNTER — TELEPHONE (OUTPATIENT)
Dept: PRIMARY CARE CLINIC | Age: 89
End: 2025-02-27

## 2025-02-27 NOTE — TELEPHONE ENCOUNTER
Call Gabrielle Augustine at [facility]. It is ok for patient to self administer her Synthroid medication.     Call patient's son Lai Brady to inform him that it ok for patient to self administer her Synthroid medication.

## 2025-02-27 NOTE — TELEPHONE ENCOUNTER
Patient having difficulty receiving medication  Please call the facility to authorize medication: ask for Gabrielle  SYNTHROID 75 MCG tablet      [facility]  [phone]

## 2025-02-28 NOTE — TELEPHONE ENCOUNTER
Patient called back again and stated to inform that the facility [facility] is not giving her medication in time. She is getting hard time --pl review and advice  Ph--[phone]

## 2025-03-10 ENCOUNTER — TELEPHONE (OUTPATIENT)
Dept: CARDIOLOGY CLINIC | Age: 89
End: 2025-03-10

## 2025-03-10 NOTE — TELEPHONE ENCOUNTER
Pt called to report since her last FL OV on 2/25 she was told to d/c aldactone for 3 days and then hold metoprolol for 3 days due to hypotension. Since stopping meds she doesn't feel much of a change. She has not recently taken her BP. States she feels better on the Metoprolol and aldactone.     [phone]

## 2025-03-10 NOTE — TELEPHONE ENCOUNTER
Pt called back regarding previous message. She says she is actually feels worse taking Metoprolol.  Spoke with Sharon HITCHCOCK and she advised if pt feels worse taking meds then she can stay off of them. Relayed info to pt and she verbally understood and was very happy to hear that.

## 2025-04-14 ENCOUNTER — TELEPHONE (OUTPATIENT)
Dept: CARDIOLOGY CLINIC | Age: 89
End: 2025-04-14

## 2025-04-14 NOTE — TELEPHONE ENCOUNTER
Notification received that patient's remote monitor for her ICD is disconnected at home w the last transmission occurring on 04.06.25. Please reach out to patient regarding disconnected monitor. If she is having problems with the monitor, please have patient call the toll free number below for further assistance.     [employer] Home Monitoring  [phone]

## 2025-04-30 PROCEDURE — 93297 REM INTERROG DEV EVAL ICPMS: CPT | Performed by: NURSE PRACTITIONER

## 2025-06-03 ENCOUNTER — OFFICE VISIT (OUTPATIENT)
Dept: CARDIOLOGY CLINIC | Age: 89
End: 2025-06-03
Payer: MEDICARE

## 2025-06-03 VITALS
SYSTOLIC BLOOD PRESSURE: 110 MMHG | WEIGHT: 135.2 LBS | OXYGEN SATURATION: 96 % | HEART RATE: 78 BPM | DIASTOLIC BLOOD PRESSURE: 58 MMHG | BODY MASS INDEX: 23.21 KG/M2

## 2025-06-03 DIAGNOSIS — I50.22 CHRONIC SYSTOLIC CONGESTIVE HEART FAILURE (HCC): ICD-10-CM

## 2025-06-03 DIAGNOSIS — Z95.810 CARDIAC DEFIBRILLATOR IN SITU: Primary | ICD-10-CM

## 2025-06-03 DIAGNOSIS — I10 ESSENTIAL HYPERTENSION: ICD-10-CM

## 2025-06-03 DIAGNOSIS — I25.119 CORONARY ARTERY DISEASE INVOLVING NATIVE CORONARY ARTERY OF NATIVE HEART WITH ANGINA PECTORIS: ICD-10-CM

## 2025-06-03 PROCEDURE — 99214 OFFICE O/P EST MOD 30 MIN: CPT | Performed by: INTERNAL MEDICINE

## 2025-06-03 PROCEDURE — 1123F ACP DISCUSS/DSCN MKR DOCD: CPT | Performed by: INTERNAL MEDICINE

## 2025-06-03 PROCEDURE — 1036F TOBACCO NON-USER: CPT | Performed by: INTERNAL MEDICINE

## 2025-06-03 PROCEDURE — G8427 DOCREV CUR MEDS BY ELIG CLIN: HCPCS | Performed by: INTERNAL MEDICINE

## 2025-06-03 PROCEDURE — 1090F PRES/ABSN URINE INCON ASSESS: CPT | Performed by: INTERNAL MEDICINE

## 2025-06-03 PROCEDURE — 1159F MED LIST DOCD IN RCRD: CPT | Performed by: INTERNAL MEDICINE

## 2025-06-03 PROCEDURE — G8420 CALC BMI NORM PARAMETERS: HCPCS | Performed by: INTERNAL MEDICINE

## 2025-06-03 RX ORDER — SACUBITRIL AND VALSARTAN 24; 26 MG/1; MG/1
1 TABLET, FILM COATED ORAL NIGHTLY
Qty: 90 TABLET | Refills: 3 | Status: SHIPPED | OUTPATIENT
Start: 2025-06-03

## 2025-06-03 NOTE — ASSESSMENT & PLAN NOTE
Controlled, seems to be having side effects with metoprolol.  Will hold for now.  Continue Aldactone, Jardiance, Entresto daily

## 2025-06-03 NOTE — ASSESSMENT & PLAN NOTE
Overall compensated.  She does complain of fatigue.  OptiVol is stable over the last few months.  Her main complaint is lightheadedness.  Continue Jardiance, transition to Entresto daily.  Continue Aldactone.  Not longer on metoprolol.

## 2025-06-26 ENCOUNTER — OFFICE VISIT (OUTPATIENT)
Dept: PRIMARY CARE CLINIC | Age: 89
End: 2025-06-26
Payer: MEDICARE

## 2025-06-26 VITALS
WEIGHT: 131 LBS | SYSTOLIC BLOOD PRESSURE: 120 MMHG | HEIGHT: 64 IN | HEART RATE: 52 BPM | DIASTOLIC BLOOD PRESSURE: 70 MMHG | OXYGEN SATURATION: 97 % | BODY MASS INDEX: 22.36 KG/M2 | RESPIRATION RATE: 14 BRPM

## 2025-06-26 DIAGNOSIS — E89.0 POSTSURGICAL HYPOTHYROIDISM: ICD-10-CM

## 2025-06-26 DIAGNOSIS — I10 ESSENTIAL HYPERTENSION: Primary | ICD-10-CM

## 2025-06-26 DIAGNOSIS — E55.9 VITAMIN D DEFICIENCY: ICD-10-CM

## 2025-06-26 DIAGNOSIS — E78.2 MIXED HYPERLIPIDEMIA: ICD-10-CM

## 2025-06-26 DIAGNOSIS — R73.03 PREDIABETES: ICD-10-CM

## 2025-06-26 PROCEDURE — G8420 CALC BMI NORM PARAMETERS: HCPCS | Performed by: INTERNAL MEDICINE

## 2025-06-26 PROCEDURE — G8427 DOCREV CUR MEDS BY ELIG CLIN: HCPCS | Performed by: INTERNAL MEDICINE

## 2025-06-26 PROCEDURE — 1123F ACP DISCUSS/DSCN MKR DOCD: CPT | Performed by: INTERNAL MEDICINE

## 2025-06-26 PROCEDURE — 1036F TOBACCO NON-USER: CPT | Performed by: INTERNAL MEDICINE

## 2025-06-26 PROCEDURE — G2211 COMPLEX E/M VISIT ADD ON: HCPCS | Performed by: INTERNAL MEDICINE

## 2025-06-26 PROCEDURE — 99214 OFFICE O/P EST MOD 30 MIN: CPT | Performed by: INTERNAL MEDICINE

## 2025-06-26 PROCEDURE — 1090F PRES/ABSN URINE INCON ASSESS: CPT | Performed by: INTERNAL MEDICINE

## 2025-06-26 SDOH — ECONOMIC STABILITY: FOOD INSECURITY: WITHIN THE PAST 12 MONTHS, YOU WORRIED THAT YOUR FOOD WOULD RUN OUT BEFORE YOU GOT MONEY TO BUY MORE.: NEVER TRUE

## 2025-06-26 SDOH — ECONOMIC STABILITY: FOOD INSECURITY: WITHIN THE PAST 12 MONTHS, THE FOOD YOU BOUGHT JUST DIDN'T LAST AND YOU DIDN'T HAVE MONEY TO GET MORE.: NEVER TRUE

## 2025-06-26 ASSESSMENT — PATIENT HEALTH QUESTIONNAIRE - PHQ9
4. FEELING TIRED OR HAVING LITTLE ENERGY: NOT AT ALL
10. IF YOU CHECKED OFF ANY PROBLEMS, HOW DIFFICULT HAVE THESE PROBLEMS MADE IT FOR YOU TO DO YOUR WORK, TAKE CARE OF THINGS AT HOME, OR GET ALONG WITH OTHER PEOPLE: NOT DIFFICULT AT ALL
9. THOUGHTS THAT YOU WOULD BE BETTER OFF DEAD, OR OF HURTING YOURSELF: NOT AT ALL
3. TROUBLE FALLING OR STAYING ASLEEP: NOT AT ALL
6. FEELING BAD ABOUT YOURSELF - OR THAT YOU ARE A FAILURE OR HAVE LET YOURSELF OR YOUR FAMILY DOWN: NOT AT ALL
8. MOVING OR SPEAKING SO SLOWLY THAT OTHER PEOPLE COULD HAVE NOTICED. OR THE OPPOSITE, BEING SO FIGETY OR RESTLESS THAT YOU HAVE BEEN MOVING AROUND A LOT MORE THAN USUAL: NOT AT ALL
2. FEELING DOWN, DEPRESSED OR HOPELESS: NOT AT ALL
SUM OF ALL RESPONSES TO PHQ QUESTIONS 1-9: 0
7. TROUBLE CONCENTRATING ON THINGS, SUCH AS READING THE NEWSPAPER OR WATCHING TELEVISION: NOT AT ALL
5. POOR APPETITE OR OVEREATING: NOT AT ALL
SUM OF ALL RESPONSES TO PHQ QUESTIONS 1-9: 0
SUM OF ALL RESPONSES TO PHQ QUESTIONS 1-9: 0
1. LITTLE INTEREST OR PLEASURE IN DOING THINGS: NOT AT ALL
SUM OF ALL RESPONSES TO PHQ QUESTIONS 1-9: 0

## 2025-06-26 NOTE — PROGRESS NOTES
Date of Visit: 2025    Filomena Appiah (:  1935) is a 89 y.o. female,  Established patient here for evaluation of the following chief complaint(s):  Hypertension, Hypothyroidism, Cholesterol Problem, and prediabetes      ASSESSMENT/PLAN:    1. Essential hypertension  Assessment & Plan:  -stable  -continue Entresto 24-26 mg nightly  -continue spironolactone 25 mg once daily  -low sodium diet  -comprehensive metabolic panel  Orders:  -     Comprehensive Metabolic Panel; Future  2. Postsurgical hypothyroidism  Assessment & Plan:  -Stable  -Continue Synthroid 75 mcg once daily   -TSH  Orders:  -     TSH; Future  3. Mixed hyperlipidemia  Assessment & Plan:  -Stable  -Continue atorvastatin 10 mg once daily  -Low fat, low cholesterol diet   -Lipid panel  -comprehensive metabolic panel  Orders:  -     Comprehensive Metabolic Panel; Future  -     Lipid Panel; Future  4. Prediabetes  Assessment & Plan:  -stable  -low carbohydrate diet  -Hemoglobin A1c  Orders:  -     Hemoglobin A1C; Future  5. Vitamin D deficiency  Assessment & Plan:  -Stable  -Continue vitamin D 2000 IU once daily   -Vitamin D 25 Hydroxy  Orders:  -     Vitamin D 25 Hydroxy; Future          Return in about 6 weeks (around 2025) for Medicare AWV.    SUBJECTIVE:    Patient has hypertension. Patient takes Entrestro 24-26 mg nightly and spironolactone 25 mg once daily. Patient is off metoprolol ER. She states Cardiology, Dr. Welsh eliminated the morning dose of Entresto for her heart failure. Patient states her blood pressure is better controlled. Patient states she is dizzy when she bends over. Patient states her BP ranges 110-120/?. Patient states she doesn't use a lot of salt.    Patient has hypothyroidism. Patient takes Synthroid 75mcg once daily. Patient states she was out of Synthroid due to the nurse couldn't find it. She states she took it every other day for 2 weeks until she got it. Patient is back to taking it daily. Patient has

## 2025-06-26 NOTE — PATIENT INSTRUCTIONS
-Continue same medications  -Low sodium diet  -Low fat, low cholesterol diet  -low carbohydrate diet

## 2025-07-05 DIAGNOSIS — E78.2 MIXED HYPERLIPIDEMIA: ICD-10-CM

## 2025-07-05 DIAGNOSIS — I48.0 PAROXYSMAL ATRIAL FIBRILLATION (HCC): ICD-10-CM

## 2025-07-05 DIAGNOSIS — I63.9 ACUTE CEREBROVASCULAR ACCIDENT (CVA) (HCC): ICD-10-CM

## 2025-07-05 DIAGNOSIS — I50.22 CHRONIC SYSTOLIC CONGESTIVE HEART FAILURE (HCC): ICD-10-CM

## 2025-07-05 DIAGNOSIS — I25.119 CORONARY ARTERY DISEASE INVOLVING NATIVE CORONARY ARTERY OF NATIVE HEART WITH ANGINA PECTORIS: ICD-10-CM

## 2025-07-05 DIAGNOSIS — I10 ESSENTIAL HYPERTENSION: ICD-10-CM

## 2025-07-05 DIAGNOSIS — R07.9 CHEST PAIN, UNSPECIFIED TYPE: ICD-10-CM

## 2025-07-07 DIAGNOSIS — I10 ESSENTIAL HYPERTENSION: ICD-10-CM

## 2025-07-07 DIAGNOSIS — R07.9 CHEST PAIN, UNSPECIFIED TYPE: ICD-10-CM

## 2025-07-07 DIAGNOSIS — I25.119 CORONARY ARTERY DISEASE INVOLVING NATIVE CORONARY ARTERY OF NATIVE HEART WITH ANGINA PECTORIS: ICD-10-CM

## 2025-07-07 DIAGNOSIS — I63.9 ACUTE CEREBROVASCULAR ACCIDENT (CVA) (HCC): ICD-10-CM

## 2025-07-07 DIAGNOSIS — E89.0 POSTSURGICAL HYPOTHYROIDISM: ICD-10-CM

## 2025-07-07 DIAGNOSIS — I48.0 PAROXYSMAL ATRIAL FIBRILLATION (HCC): ICD-10-CM

## 2025-07-07 DIAGNOSIS — E78.2 MIXED HYPERLIPIDEMIA: ICD-10-CM

## 2025-07-07 DIAGNOSIS — I50.22 CHRONIC SYSTOLIC CONGESTIVE HEART FAILURE (HCC): ICD-10-CM

## 2025-07-07 RX ORDER — EMPAGLIFLOZIN 10 MG/1
10 TABLET, FILM COATED ORAL DAILY
Qty: 30 TABLET | Refills: 0 | OUTPATIENT
Start: 2025-07-07

## 2025-07-07 RX ORDER — SPIRONOLACTONE 25 MG/1
25 TABLET ORAL DAILY
Qty: 90 TABLET | Refills: 0 | Status: SHIPPED | OUTPATIENT
Start: 2025-07-07

## 2025-07-07 RX ORDER — ATORVASTATIN CALCIUM 10 MG/1
10 TABLET, FILM COATED ORAL NIGHTLY
Qty: 90 TABLET | Refills: 0 | Status: SHIPPED | OUTPATIENT
Start: 2025-07-07

## 2025-07-07 RX ORDER — LEVOTHYROXINE SODIUM 75 MCG
75 TABLET ORAL DAILY
Qty: 90 TABLET | Refills: 0 | Status: SHIPPED | OUTPATIENT
Start: 2025-07-07

## 2025-07-07 RX ORDER — EMPAGLIFLOZIN 10 MG/1
10 TABLET, FILM COATED ORAL DAILY
Qty: 30 TABLET | Refills: 11 | OUTPATIENT
Start: 2025-07-07

## 2025-07-07 RX ORDER — ATORVASTATIN CALCIUM 10 MG/1
TABLET, FILM COATED ORAL
Qty: 30 TABLET | Refills: 11 | OUTPATIENT
Start: 2025-07-07

## 2025-07-07 NOTE — TELEPHONE ENCOUNTER
Medication:   Requested Prescriptions     Pending Prescriptions Disp Refills    SYNTHROID 75 MCG tablet [Pharmacy Med Name: Synthroid 75 MCG Tablet] 2 tablet 11     Sig: TAKE 1 TABLET BY MOUTH ONCE A DAY     Last Filled:  01/31/2025    Last appt: 6/26/2025   Next appt: 7/7/2025    Last Thyroid:   Lab Results   Component Value Date/Time    TSH 0.75 12/05/2024 08:42 AM

## 2025-07-07 NOTE — TELEPHONE ENCOUNTER
Requested Prescriptions     Pending Prescriptions Disp Refills    empagliflozin (JARDIANCE) 10 MG tablet 90 tablet 3     Sig: Take 1 tablet by mouth daily            Checked Correct Pharmacy: Yes    Any changes since last refill? No     Number: 90  Refills: 3    Last OV: 12/19/2024 Provider: ROMINA    Next OV: 8/19/2025 Provider: CHRIS    Last Labs:   CBC:  Lab Results   Component Value Date    WBC 7.4 12/05/2024    HGB 11.4 (L) 12/05/2024    HCT 35.3 (L) 12/05/2024    MCV 87.4 12/05/2024     12/05/2024    LYMPHOPCT 26.5 10/25/2024    RBC 4.04 12/05/2024    MCH 28.3 12/05/2024    MCHC 32.3 12/05/2024    RDW 15.8 (H) 12/05/2024           BMP:  Lab Results   Component Value Date/Time     12/05/2024 08:42 AM    K 3.9 12/05/2024 08:42 AM    K 3.6 03/31/2020 05:02 AM     12/05/2024 08:42 AM    CO2 25 12/05/2024 08:42 AM    BUN 17 12/05/2024 08:42 AM    CREATININE 0.9 12/05/2024 08:42 AM    GLUCOSE 122 12/05/2024 08:42 AM    CALCIUM 10.0 12/05/2024 08:42 AM    LABGLOM 62 12/05/2024 08:42 AM    LABGLOM >60 03/19/2024 09:31 AM      CMP:  Lab Results   Component Value Date     12/05/2024    K 3.9 12/05/2024     12/05/2024    CO2 25 12/05/2024    BUN 17 12/05/2024    CREATININE 0.9 12/05/2024    GLUCOSE 122 (H) 12/05/2024    CALCIUM 10.0 12/05/2024    BILITOT 0.5 10/25/2024    ALKPHOS 66 10/25/2024    AST 23 10/25/2024    ALT 10 10/25/2024    LABGLOM 62 12/05/2024    GFRAA >60 08/05/2021    AGRATIO 2.3 (H) 10/25/2024    GLOB 2.0 08/05/2021         Lipid:  Lab Results   Component Value Date    CHOL 177 10/25/2024    TRIG 47 10/25/2024    HDL 80 (H) 10/25/2024    LDL 88 10/25/2024    VLDL 9 10/25/2024

## 2025-07-07 NOTE — TELEPHONE ENCOUNTER
Medication:   Requested Prescriptions     Pending Prescriptions Disp Refills    spironolactone (ALDACTONE) 25 MG tablet [Pharmacy Med Name: Spironolactone 25 MG Tablet] 30 tablet 0     Sig: TAKE 1 TABLET BY MOUTH ONCE A DAY     Refused Prescriptions Disp Refills    atorvastatin (LIPITOR) 10 MG tablet [Pharmacy Med Name: Atorvastatin Calcium 10 MG Tablet] 30 tablet 11     Sig: TAKE 1 TABLET BY MOUTH ONCE A DAY AT BEDTIME    JARDIANCE 10 MG tablet [Pharmacy Med Name: Jardiance 10 MG Tablet] 30 tablet 11     Sig: TAKE 1 TABLET BY MOUTH ONCE A DAY     Last filled: 7/16/24  Last appt: 6/26/2025   Next appt: 7/5/2025    Last OAR[place]:       3/8/2024     9:41 AM   RX Monitoring   Periodic Controlled Substance Monitoring No signs of potential drug abuse or diversion identified.

## 2025-07-07 NOTE — TELEPHONE ENCOUNTER
Medication:   Requested Prescriptions     Pending Prescriptions Disp Refills    atorvastatin (LIPITOR) 10 MG tablet 90 tablet 0     Sig: Take 1 tablet by mouth at bedtime     Last Filled:  7/9/24    Last appt: 6/26/2025   Next appt: 9/16/2025    Last Lipid:   Lab Results   Component Value Date/Time    CHOL 177 10/25/2024 08:30 AM    TRIG 47 10/25/2024 08:30 AM    HDL 80 10/25/2024 08:30 AM

## 2025-07-08 RX ORDER — EMPAGLIFLOZIN 10 MG/1
10 TABLET, FILM COATED ORAL DAILY
Qty: 30 TABLET | Refills: 2 | OUTPATIENT
Start: 2025-07-08

## 2025-07-09 ASSESSMENT — ENCOUNTER SYMPTOMS
COUGH: 0
SORE THROAT: 0
NAUSEA: 0
VOMITING: 0
DIARRHEA: 0
ABDOMINAL PAIN: 0
CONSTIPATION: 0
SHORTNESS OF BREATH: 0
CHEST TIGHTNESS: 0

## 2025-07-10 ASSESSMENT — ENCOUNTER SYMPTOMS: TROUBLE SWALLOWING: 0

## 2025-07-10 NOTE — ASSESSMENT & PLAN NOTE
-stable  -continue Entresto 24-26 mg nightly  -continue spironolactone 25 mg once daily  -low sodium diet  -comprehensive metabolic panel

## 2025-07-10 NOTE — ASSESSMENT & PLAN NOTE
-Stable  -Continue atorvastatin 10 mg once daily  -Low fat, low cholesterol diet   -Lipid panel  -comprehensive metabolic panel

## 2025-07-24 NOTE — PROGRESS NOTES
Chart reviewed. RX refilled per protocol.   
DEVICE 1ST LESION RIGHT Right 8/15/2024    US BREAST BIOPSY W LOC DEVICE 1ST LESION RIGHT 8/15/2024 [facility] ULTRASOUND        Social HIstory  Social History     Tobacco Use    Smoking status: Former     Current packs/day: 0.00     Average packs/day: 0.5 packs/day for 15.0 years (7.5 ttl pk-yrs)     Types: Cigarettes     Start date: 1966     Quit date: 1981     Years since quittin.1    Smokeless tobacco: Never   Vaping Use    Vaping status: Never Used   Substance Use Topics    Alcohol use: Yes     Comment: rare    Drug use: Never       Family History  Family History   Problem Relation Age of Onset    Heart Disease Mother     Cancer Father        Allergies   Allergies   Allergen Reactions    Penicillins Other (See Comments)     Tingling sensation on lip       Medications:     Home Medications:  Were reviewed and are listed in nursing record. and/or listed below    Prior to Admission medications    Medication Sig Start Date End Date Taking? Authorizing Provider   ascorbic acid (VITAMIN C) 500 MG tablet Take 1 tablet by mouth   Yes ProviderMichelle MD   SYNTHROID 75 MCG tablet Take 1 tablet by mouth daily 25  Yes Analisa Horne MD   vitamin D (CHOLECALCIFEROL) 50 MCG (2000) TABS tablet Take 1 tablet by mouth daily 24  Yes Analisa Horne MD   nitroGLYCERIN (NITROSTAT) 0.4 MG SL tablet Place 1 tablet under the tongue every 5 minutes as needed for Chest pain 24  Yes Florencio Espinoza MD   famotidine (PEPCID) 20 MG tablet Take 1 tablet by mouth 2 times daily as needed (abdominal tenderness, reflux, or vomiting) 24  Yes Analisa Horne MD   empagliflozin (JARDIANCE) 10 MG tablet Take 1 tablet by mouth daily 24  Yes Florencio Espinoza MD   spironolactone (ALDACTONE) 25 MG tablet Take 1 tablet by mouth daily 24  Yes Analisa Horne MD   sacubitril-valsartan (ENTRESTO) 24-26 MG per tablet Take 1 tablet by mouth 2 times daily 7/10/24  Yes

## 2025-07-30 DIAGNOSIS — E89.0 POSTSURGICAL HYPOTHYROIDISM: ICD-10-CM

## 2025-07-30 DIAGNOSIS — I10 ESSENTIAL HYPERTENSION: ICD-10-CM

## 2025-07-30 DIAGNOSIS — E78.2 MIXED HYPERLIPIDEMIA: ICD-10-CM

## 2025-07-30 DIAGNOSIS — R73.03 PREDIABETES: ICD-10-CM

## 2025-07-30 DIAGNOSIS — E55.9 VITAMIN D DEFICIENCY: ICD-10-CM

## 2025-07-30 LAB
25(OH)D3 SERPL-MCNC: 32 NG/ML
ALBUMIN SERPL-MCNC: 4.3 G/DL (ref 3.4–5)
ALBUMIN/GLOB SERPL: 2.4 {RATIO} (ref 1.1–2.2)
ALP SERPL-CCNC: 63 U/L (ref 40–129)
ALT SERPL-CCNC: 11 U/L (ref 10–40)
ANION GAP SERPL CALCULATED.3IONS-SCNC: 13 MMOL/L (ref 3–16)
AST SERPL-CCNC: 24 U/L (ref 15–37)
BILIRUB SERPL-MCNC: 0.8 MG/DL (ref 0–1)
BUN SERPL-MCNC: 17 MG/DL (ref 7–20)
CALCIUM SERPL-MCNC: 10 MG/DL (ref 8.3–10.6)
CHLORIDE SERPL-SCNC: 108 MMOL/L (ref 99–110)
CHOLEST SERPL-MCNC: 151 MG/DL (ref 0–199)
CO2 SERPL-SCNC: 24 MMOL/L (ref 21–32)
CREAT SERPL-MCNC: 0.8 MG/DL (ref 0.6–1.2)
GFR SERPLBLD CREATININE-BSD FMLA CKD-EPI: 71 ML/MIN/{1.73_M2}
GLUCOSE SERPL-MCNC: 72 MG/DL (ref 70–99)
HDLC SERPL-MCNC: 81 MG/DL (ref 40–60)
LDLC SERPL CALC-MCNC: 61 MG/DL
POTASSIUM SERPL-SCNC: 4.3 MMOL/L (ref 3.5–5.1)
PROT SERPL-MCNC: 6.1 G/DL (ref 6.4–8.2)
SODIUM SERPL-SCNC: 145 MMOL/L (ref 136–145)
TRIGL SERPL-MCNC: 46 MG/DL (ref 0–150)
TSH SERPL DL<=0.005 MIU/L-ACNC: 0.73 UIU/ML (ref 0.27–4.2)
VLDLC SERPL CALC-MCNC: 9 MG/DL

## 2025-07-31 LAB
EST. AVERAGE GLUCOSE BLD GHB EST-MCNC: 119.8 MG/DL
HBA1C MFR BLD: 5.8 %

## 2025-08-05 ENCOUNTER — TELEPHONE (OUTPATIENT)
Dept: PRIMARY CARE CLINIC | Age: 89
End: 2025-08-05

## 2025-08-19 ENCOUNTER — OFFICE VISIT (OUTPATIENT)
Dept: CARDIOLOGY CLINIC | Age: 89
End: 2025-08-19
Payer: MEDICARE

## 2025-08-19 VITALS
HEART RATE: 65 BPM | DIASTOLIC BLOOD PRESSURE: 80 MMHG | WEIGHT: 132 LBS | BODY MASS INDEX: 22.66 KG/M2 | SYSTOLIC BLOOD PRESSURE: 130 MMHG

## 2025-08-19 DIAGNOSIS — I50.22 CHRONIC SYSTOLIC CHF (CONGESTIVE HEART FAILURE) (HCC): ICD-10-CM

## 2025-08-19 DIAGNOSIS — I10 ESSENTIAL HYPERTENSION: ICD-10-CM

## 2025-08-19 DIAGNOSIS — I25.119 CORONARY ARTERY DISEASE INVOLVING NATIVE CORONARY ARTERY OF NATIVE HEART WITH ANGINA PECTORIS: ICD-10-CM

## 2025-08-19 DIAGNOSIS — Z95.810 ICD (IMPLANTABLE CARDIOVERTER-DEFIBRILLATOR), DUAL, IN SITU: Primary | ICD-10-CM

## 2025-08-19 DIAGNOSIS — I42.8 NON-ISCHEMIC CARDIOMYOPATHY (HCC): ICD-10-CM

## 2025-08-19 PROCEDURE — G8420 CALC BMI NORM PARAMETERS: HCPCS | Performed by: INTERNAL MEDICINE

## 2025-08-19 PROCEDURE — 99214 OFFICE O/P EST MOD 30 MIN: CPT | Performed by: INTERNAL MEDICINE

## 2025-08-19 PROCEDURE — 1036F TOBACCO NON-USER: CPT | Performed by: INTERNAL MEDICINE

## 2025-08-19 PROCEDURE — 1090F PRES/ABSN URINE INCON ASSESS: CPT | Performed by: INTERNAL MEDICINE

## 2025-08-19 PROCEDURE — 93000 ELECTROCARDIOGRAM COMPLETE: CPT | Performed by: INTERNAL MEDICINE

## 2025-08-19 PROCEDURE — G8428 CUR MEDS NOT DOCUMENT: HCPCS | Performed by: INTERNAL MEDICINE

## 2025-08-19 PROCEDURE — 1123F ACP DISCUSS/DSCN MKR DOCD: CPT | Performed by: INTERNAL MEDICINE

## 2025-09-02 ENCOUNTER — OFFICE VISIT (OUTPATIENT)
Dept: CARDIOLOGY CLINIC | Age: 89
End: 2025-09-02
Payer: MEDICARE

## 2025-09-02 VITALS
DIASTOLIC BLOOD PRESSURE: 72 MMHG | SYSTOLIC BLOOD PRESSURE: 130 MMHG | BODY MASS INDEX: 22.69 KG/M2 | HEART RATE: 73 BPM | WEIGHT: 132.2 LBS

## 2025-09-02 DIAGNOSIS — I25.119 CORONARY ARTERY DISEASE INVOLVING NATIVE CORONARY ARTERY OF NATIVE HEART WITH ANGINA PECTORIS: Primary | ICD-10-CM

## 2025-09-02 DIAGNOSIS — I10 ESSENTIAL HYPERTENSION: ICD-10-CM

## 2025-09-02 DIAGNOSIS — E78.2 MIXED HYPERLIPIDEMIA: Chronic | ICD-10-CM

## 2025-09-02 DIAGNOSIS — I50.22 CHRONIC SYSTOLIC CONGESTIVE HEART FAILURE (HCC): ICD-10-CM

## 2025-09-02 PROCEDURE — 1123F ACP DISCUSS/DSCN MKR DOCD: CPT | Performed by: INTERNAL MEDICINE

## 2025-09-02 PROCEDURE — G8420 CALC BMI NORM PARAMETERS: HCPCS | Performed by: INTERNAL MEDICINE

## 2025-09-02 PROCEDURE — 99214 OFFICE O/P EST MOD 30 MIN: CPT | Performed by: INTERNAL MEDICINE

## 2025-09-02 PROCEDURE — 1159F MED LIST DOCD IN RCRD: CPT | Performed by: INTERNAL MEDICINE

## 2025-09-02 PROCEDURE — 1090F PRES/ABSN URINE INCON ASSESS: CPT | Performed by: INTERNAL MEDICINE

## 2025-09-02 PROCEDURE — 1036F TOBACCO NON-USER: CPT | Performed by: INTERNAL MEDICINE

## 2025-09-02 PROCEDURE — G8427 DOCREV CUR MEDS BY ELIG CLIN: HCPCS | Performed by: INTERNAL MEDICINE

## (undated) DEVICE — MAJOR SET UP PK

## (undated) DEVICE — BLADE ES ELASTOMERIC COAT INSUL DURABLE BEND UPTO 90DEG

## (undated) DEVICE — SOLUTION IV IRRIG POUR BRL 0.9% SODIUM CHL 2F7124

## (undated) DEVICE — SUTURE VCRL SZ 4-0 L18IN ABSRB UD L19MM PS-2 3/8 CIR PRIM J496H

## (undated) DEVICE — CHLORAPREP 26ML ORANGE

## (undated) DEVICE — CONTROL SYRINGE LUER-LOCK TIP: Brand: MONOJECT

## (undated) DEVICE — DRAPE ADOLESCENT  LAPAROTOMY

## (undated) DEVICE — 3M™ TEGADERM™ TRANSPARENT FILM DRESSING FRAME STYLE, 1628, 6 IN X 8 IN (15 CM X 20 CM), 10/CT 8CT/CASE: Brand: 3M™ TEGADERM™

## (undated) DEVICE — SYSTEM SKIN CLSR 22CM DERMBND PRINEO

## (undated) DEVICE — STERILE POLYISOPRENE POWDER-FREE SURGICAL GLOVES: Brand: PROTEXIS

## (undated) DEVICE — PAD,NON-ADHERENT,3X8,STERILE,LF,1/PK: Brand: MEDLINE

## (undated) DEVICE — MERCY FAIRFIELD TURNOVER KIT: Brand: MEDLINE INDUSTRIES, INC.

## (undated) DEVICE — GOWN,AURORA,NONREINF,RAGLAN,XXL,STERILE: Brand: MEDLINE

## (undated) DEVICE — NEEDLE HYPO 22GA L1.5IN BLK POLYPR HUB S STL REG BVL STR